# Patient Record
Sex: MALE | Race: WHITE
[De-identification: names, ages, dates, MRNs, and addresses within clinical notes are randomized per-mention and may not be internally consistent; named-entity substitution may affect disease eponyms.]

---

## 2017-03-02 ENCOUNTER — APPOINTMENT (OUTPATIENT)
Age: 47
End: 2017-03-02

## 2017-03-02 ENCOUNTER — APPOINTMENT (OUTPATIENT)
Dept: UROLOGY | Facility: CLINIC | Age: 47
End: 2017-03-02

## 2017-03-02 VITALS — HEIGHT: 69 IN | BODY MASS INDEX: 24.73 KG/M2 | WEIGHT: 167 LBS

## 2017-03-02 VITALS — SYSTOLIC BLOOD PRESSURE: 121 MMHG | DIASTOLIC BLOOD PRESSURE: 80 MMHG | HEART RATE: 62 BPM | OXYGEN SATURATION: 98 %

## 2017-03-02 DIAGNOSIS — R39.15 URGENCY OF URINATION: ICD-10-CM

## 2017-03-02 DIAGNOSIS — I86.1 SCROTAL VARICES: ICD-10-CM

## 2017-03-02 DIAGNOSIS — N50.812 LEFT TESTICULAR PAIN: ICD-10-CM

## 2017-03-02 DIAGNOSIS — F52.21 MALE ERECTILE DISORDER: ICD-10-CM

## 2017-03-02 DIAGNOSIS — R35.0 FREQUENCY OF MICTURITION: ICD-10-CM

## 2017-03-02 DIAGNOSIS — Z86.39 PERSONAL HISTORY OF OTHER ENDOCRINE, NUTRITIONAL AND METABOLIC DISEASE: ICD-10-CM

## 2017-03-02 DIAGNOSIS — F17.200 NICOTINE DEPENDENCE, UNSPECIFIED, UNCOMPLICATED: ICD-10-CM

## 2017-03-02 DIAGNOSIS — Z86.79 PERSONAL HISTORY OF OTHER DISEASES OF THE CIRCULATORY SYSTEM: ICD-10-CM

## 2017-03-02 RX ORDER — ALPRAZOLAM 0.25 MG/1
0.25 TABLET ORAL
Qty: 60 | Refills: 0 | Status: ACTIVE | COMMUNITY
Start: 2017-01-05

## 2017-03-02 RX ORDER — LOSARTAN POTASSIUM 50 MG/1
50 TABLET, FILM COATED ORAL
Qty: 30 | Refills: 0 | Status: ACTIVE | COMMUNITY
Start: 2017-02-13

## 2019-10-01 ENCOUNTER — TRANSCRIPTION ENCOUNTER (OUTPATIENT)
Age: 49
End: 2019-10-01

## 2024-05-06 ENCOUNTER — APPOINTMENT (OUTPATIENT)
Dept: ORTHOPEDIC SURGERY | Facility: CLINIC | Age: 54
End: 2024-05-06
Payer: OTHER MISCELLANEOUS

## 2024-05-06 ENCOUNTER — RESULT CHARGE (OUTPATIENT)
Age: 54
End: 2024-05-06

## 2024-05-06 ENCOUNTER — NON-APPOINTMENT (OUTPATIENT)
Age: 54
End: 2024-05-06

## 2024-05-06 VITALS — WEIGHT: 180 LBS | HEIGHT: 68 IN | BODY MASS INDEX: 27.28 KG/M2

## 2024-05-06 DIAGNOSIS — M25.40 EFFUSION, UNSPECIFIED JOINT: ICD-10-CM

## 2024-05-06 PROCEDURE — 73562 X-RAY EXAM OF KNEE 3: CPT | Mod: RT

## 2024-05-06 PROCEDURE — 20610 DRAIN/INJ JOINT/BURSA W/O US: CPT | Mod: RT

## 2024-05-06 PROCEDURE — 99203 OFFICE O/P NEW LOW 30 MIN: CPT | Mod: ACP

## 2024-05-06 RX ORDER — OMEPRAZOLE 20 MG/1
20 CAPSULE, DELAYED RELEASE ORAL
Refills: 0 | Status: ACTIVE | COMMUNITY

## 2024-05-06 NOTE — HISTORY OF PRESENT ILLNESS
[de-identified] : 54-year-old male is here today for evaluation of his right knee.  Patient states he was at work, he states he went to walk in between 2 doors and slammed his knee into a door.  He states since then he has been having pain and difficulty walking.  This happened almost 2 weeks ago.  Denies any previous injuries to the knee.  He denies any buckling or instability.  He states he has been taking Tylenol and ibuprofen and using lidocaine patches.  He has been working since this happened but is having pain.  He denies any numbness tingling in the leg or any calf pain.

## 2024-05-06 NOTE — IMAGING
[de-identified] : On examination of his right knee he has mild swelling, no erythema, no ecchymosis.  He is nontender to the patella facets, negative patellar grind.  He is able to straight leg raise.  He can flex and extend the knee, he has some pain and decreased range of motion with flexion.  Negative varus valgus stress test, negative anterior drawer.  He is tender over the medial joint line, he is nontender over the lateral joint line.  He is nontender over the patella or quadricep tendons.  Positive Madan's.  The calf is soft and nontender.  X-rays taken in the office today of the right knee show some decreased range of motion of the medial and patellofemoral compartments.  No fractures, dislocations, or other bony abnormalities noted.

## 2024-05-06 NOTE — DISCUSSION/SUMMARY
[de-identified] : At this time we discussed different treatment options, he would like to do a cortisone injection today. Today under sterile technique the area was cleaned and prepped with alcohol, anesthetized with cold spray and prepped with betadine.  With the patient's consent, I injected 2.5 cc of dexamethasone and 2.5 cc of 1% lidocaine into the lateral joint space of the knee. The patient tolerated this well. The puncture site was cleaned and covered with a Band-Aid.  He is can to continue to rest ice and elevate, continue anti-inflammatories as needed for pain.  We will also submit for an MRI to rule out a meniscal tear.  He can call after the MRI to go over the results.  Will schedule him a follow-up for repeat evaluation. Patient will call me if any other problems or concerns.  Patient verbalized understanding and agreed with the plan, all questions were answered in the office today.  This is a Worker's Compensation case, he is going to return to work on Monday, May 13, 2024.  His degree of disability is mild-moderate.

## 2024-05-08 ENCOUNTER — NON-APPOINTMENT (OUTPATIENT)
Age: 54
End: 2024-05-08

## 2024-05-28 ENCOUNTER — APPOINTMENT (OUTPATIENT)
Dept: ORTHOPEDIC SURGERY | Facility: CLINIC | Age: 54
End: 2024-05-28
Payer: OTHER MISCELLANEOUS

## 2024-05-28 DIAGNOSIS — S89.91XA UNSPECIFIED INJURY OF RIGHT LOWER LEG, INITIAL ENCOUNTER: ICD-10-CM

## 2024-05-28 PROCEDURE — 99204 OFFICE O/P NEW MOD 45 MIN: CPT

## 2024-05-28 RX ORDER — MELOXICAM 15 MG/1
15 TABLET ORAL
Qty: 30 | Refills: 1 | Status: ACTIVE | COMMUNITY
Start: 2024-05-28 | End: 1900-01-01

## 2024-05-28 NOTE — HISTORY OF PRESENT ILLNESS
[de-identified] : Patient here for evaluation right knee pain. Complains of joint line pain Positive mechanical symptoms and catching  has had cortisone with no improvement  NAD Right knee No skin breakdown Medial joint line ttp Positive trina Negative lachman Negative varus/valgus instability ROM 0-130 Pain with forced extension and flexion NVI Compartments soft and NT  Xray reviewed and significant for right knee mild degenerative changes  mri right knee: mmt, chondromalacia  plan went over findings explained the imaging tx options explained op vs nonop will start pt fu in 6 wks, if no improvement, will consider knee arthroscopy mobic sent for pain

## 2024-07-09 ENCOUNTER — APPOINTMENT (OUTPATIENT)
Dept: ORTHOPEDIC SURGERY | Facility: CLINIC | Age: 54
End: 2024-07-09
Payer: OTHER MISCELLANEOUS

## 2024-07-09 DIAGNOSIS — S89.91XA UNSPECIFIED INJURY OF RIGHT LOWER LEG, INITIAL ENCOUNTER: ICD-10-CM

## 2024-07-09 PROCEDURE — 99214 OFFICE O/P EST MOD 30 MIN: CPT

## 2024-10-14 ENCOUNTER — OUTPATIENT (OUTPATIENT)
Dept: OUTPATIENT SERVICES | Facility: HOSPITAL | Age: 54
LOS: 1 days | End: 2024-10-14
Payer: COMMERCIAL

## 2024-10-14 VITALS
DIASTOLIC BLOOD PRESSURE: 96 MMHG | HEIGHT: 68 IN | HEART RATE: 60 BPM | SYSTOLIC BLOOD PRESSURE: 158 MMHG | WEIGHT: 178.57 LBS | TEMPERATURE: 98 F | RESPIRATION RATE: 18 BRPM | OXYGEN SATURATION: 98 %

## 2024-10-14 DIAGNOSIS — I86.1 SCROTAL VARICES: Chronic | ICD-10-CM

## 2024-10-14 DIAGNOSIS — S69.91XA UNSPECIFIED INJURY OF RIGHT WRIST, HAND AND FINGER(S), INITIAL ENCOUNTER: Chronic | ICD-10-CM

## 2024-10-14 DIAGNOSIS — S83.231A COMPLEX TEAR OF MEDIAL MENISCUS, CURRENT INJURY, RIGHT KNEE, INITIAL ENCOUNTER: ICD-10-CM

## 2024-10-14 DIAGNOSIS — T81.89XA OTHER COMPLICATIONS OF PROCEDURES, NOT ELSEWHERE CLASSIFIED, INITIAL ENCOUNTER: Chronic | ICD-10-CM

## 2024-10-14 LAB
ALBUMIN SERPL ELPH-MCNC: 4.6 G/DL — SIGNIFICANT CHANGE UP (ref 3.5–5.2)
ALP SERPL-CCNC: 79 U/L — SIGNIFICANT CHANGE UP (ref 30–115)
ALT FLD-CCNC: 15 U/L — SIGNIFICANT CHANGE UP (ref 0–41)
ANION GAP SERPL CALC-SCNC: 11 MMOL/L — SIGNIFICANT CHANGE UP (ref 7–14)
AST SERPL-CCNC: 14 U/L — SIGNIFICANT CHANGE UP (ref 0–41)
BASOPHILS # BLD AUTO: 0.06 K/UL — SIGNIFICANT CHANGE UP (ref 0–0.2)
BASOPHILS NFR BLD AUTO: 0.9 % — SIGNIFICANT CHANGE UP (ref 0–1)
BILIRUB SERPL-MCNC: 0.4 MG/DL — SIGNIFICANT CHANGE UP (ref 0.2–1.2)
BUN SERPL-MCNC: 18 MG/DL — SIGNIFICANT CHANGE UP (ref 10–20)
CALCIUM SERPL-MCNC: 9.8 MG/DL — SIGNIFICANT CHANGE UP (ref 8.4–10.5)
CHLORIDE SERPL-SCNC: 110 MMOL/L — SIGNIFICANT CHANGE UP (ref 98–110)
CO2 SERPL-SCNC: 24 MMOL/L — SIGNIFICANT CHANGE UP (ref 17–32)
CREAT SERPL-MCNC: 0.9 MG/DL — SIGNIFICANT CHANGE UP (ref 0.7–1.5)
EGFR: 100 ML/MIN/1.73M2 — SIGNIFICANT CHANGE UP
EOSINOPHIL # BLD AUTO: 0.06 K/UL — SIGNIFICANT CHANGE UP (ref 0–0.7)
EOSINOPHIL NFR BLD AUTO: 0.9 % — SIGNIFICANT CHANGE UP (ref 0–8)
GLUCOSE SERPL-MCNC: 108 MG/DL — HIGH (ref 70–99)
HCT VFR BLD CALC: 44.8 % — SIGNIFICANT CHANGE UP (ref 42–52)
HCV AB S/CO SERPL IA: 0.05 COI — SIGNIFICANT CHANGE UP
HCV AB SERPL-IMP: SIGNIFICANT CHANGE UP
HGB BLD-MCNC: 15.2 G/DL — SIGNIFICANT CHANGE UP (ref 14–18)
IMM GRANULOCYTES NFR BLD AUTO: 0.3 % — SIGNIFICANT CHANGE UP (ref 0.1–0.3)
LYMPHOCYTES # BLD AUTO: 1.45 K/UL — SIGNIFICANT CHANGE UP (ref 1.2–3.4)
LYMPHOCYTES # BLD AUTO: 21 % — SIGNIFICANT CHANGE UP (ref 20.5–51.1)
MCHC RBC-ENTMCNC: 29.6 PG — SIGNIFICANT CHANGE UP (ref 27–31)
MCHC RBC-ENTMCNC: 33.9 G/DL — SIGNIFICANT CHANGE UP (ref 32–37)
MCV RBC AUTO: 87.3 FL — SIGNIFICANT CHANGE UP (ref 80–94)
MONOCYTES # BLD AUTO: 0.5 K/UL — SIGNIFICANT CHANGE UP (ref 0.1–0.6)
MONOCYTES NFR BLD AUTO: 7.3 % — SIGNIFICANT CHANGE UP (ref 1.7–9.3)
NEUTROPHILS # BLD AUTO: 4.8 K/UL — SIGNIFICANT CHANGE UP (ref 1.4–6.5)
NEUTROPHILS NFR BLD AUTO: 69.6 % — SIGNIFICANT CHANGE UP (ref 42.2–75.2)
NRBC # BLD: 0 /100 WBCS — SIGNIFICANT CHANGE UP (ref 0–0)
PLATELET # BLD AUTO: 241 K/UL — SIGNIFICANT CHANGE UP (ref 130–400)
PMV BLD: 11.2 FL — HIGH (ref 7.4–10.4)
POTASSIUM SERPL-MCNC: 4.5 MMOL/L — SIGNIFICANT CHANGE UP (ref 3.5–5)
POTASSIUM SERPL-SCNC: 4.5 MMOL/L — SIGNIFICANT CHANGE UP (ref 3.5–5)
PROT SERPL-MCNC: 6.9 G/DL — SIGNIFICANT CHANGE UP (ref 6–8)
RBC # BLD: 5.13 M/UL — SIGNIFICANT CHANGE UP (ref 4.7–6.1)
RBC # FLD: 11.9 % — SIGNIFICANT CHANGE UP (ref 11.5–14.5)
SODIUM SERPL-SCNC: 145 MMOL/L — SIGNIFICANT CHANGE UP (ref 135–146)
WBC # BLD: 6.89 K/UL — SIGNIFICANT CHANGE UP (ref 4.8–10.8)
WBC # FLD AUTO: 6.89 K/UL — SIGNIFICANT CHANGE UP (ref 4.8–10.8)

## 2024-10-14 PROCEDURE — 85025 COMPLETE CBC W/AUTO DIFF WBC: CPT

## 2024-10-14 PROCEDURE — 36415 COLL VENOUS BLD VENIPUNCTURE: CPT

## 2024-10-14 PROCEDURE — 80053 COMPREHEN METABOLIC PANEL: CPT

## 2024-10-14 PROCEDURE — 86803 HEPATITIS C AB TEST: CPT

## 2024-10-14 PROCEDURE — 93010 ELECTROCARDIOGRAM REPORT: CPT

## 2024-10-14 PROCEDURE — 99214 OFFICE O/P EST MOD 30 MIN: CPT | Mod: 25

## 2024-10-14 PROCEDURE — 93005 ELECTROCARDIOGRAM TRACING: CPT

## 2024-10-14 NOTE — H&P PST ADULT - NSANTHOSAYNRD_GEN_A_CORE
No. CHIKIS screening performed.  STOP BANG Legend: 0-2 = LOW Risk; 3-4 = INTERMEDIATE Risk; 5-8 = HIGH Risk

## 2024-10-14 NOTE — H&P PST ADULT - NSICDXPASTMEDICALHX_GEN_ALL_CORE_FT
PAST MEDICAL HISTORY:  Anxiety, Stress, and Tension     Depression     Dyslipidemia     Former smoker     [place] (hard of hearing)     HTN (hypertension)

## 2024-10-14 NOTE — H&P PST ADULT - HISTORY OF PRESENT ILLNESS
SCHEDULED FOR 10/28/24  RIGHT KNEE ARTHROSCOPY MEDIAL MENISCECTOMY  RIGHT KNEE PAIN X MANY MONTHS  SINCE INJURY 4/2024   PAIN SCALE 7/10 right knee aching throbbing complaints  /96. TODAY IN PRETESTING   Patient appears very anxious and claims "white coat syndrome "  also non compliant with medications .  patient stated he was advised not to take atorvastatin any longer due to diet control- BY PHYSICIAN  he stated he was prescribed lisinopril 40 mg for BP control-but does Not take.    Advised to take medications as advised and advised ER.   've discussed with the patient the abnormality found during their pre-procedure evaluation.  They have been told to go to the ER for further evaluation but refuse.  I have discussed the risks including the possibility of worsening disease, pain, permanent disability, and/or death with them.  They voiced understanding of this to me.  I advised them that they can and should go immediately if they develop any worse/different/additional symptoms, or if they change their mind and want to continue their evaluation as we discussed.    PATIENT STATES HE WILL FOLLOW UP WITH PRIMARY DOCTOR Today      PATIENT CURRENTLY DENIES CHEST PAIN  SHORTNESS OF BREATH  PALPITATIONS,  DYSURIA, OR UPPER RESPIRATORY INFECTION IN PAST 2 WEEKS    denies travel outside the [place] in the past 30 days  Denies fevers, chills or any symptoms of recent UTI's or any rashes.   Denies near syncope or syncope episodes.     Anesthesia Alert  NO--Difficult Airway  NO--History of neck surgery or radiation  NO--Limited ROM of neck  NO--History of Malignant hyperthermia  NO--No personal or family history of Pseudocholinesterase deficiency.  NO--Prior Anesthesia Complication  NO--Latex Allergy  NO--Loose teeth  NO--History of Rheumatoid Arthritis  NO--Bleeding risk  NO--CHIKIS  NO--Other_____    PT DENIES ANY RASHES, ABRASION, OR OPEN WOUNDS OR CUTS    AS PER THE PT, THIS IS HIS/HER COMPLETE MEDICAL AND SURGICAL HX, INCLUDING MEDICATIONS PRESCRIBED AND OVER THE COUNTER    Patient verbalized understanding of instructions and was given the opportunity to ask questions and have them answered.    pt denies any suicidal ideation or thoughts, pt states not a threat to self or others      Duke Activity Status Index (DASI) from [email or web address]  on 10/14/2024  ** All calculations should be rechecked by clinician prior to use **    RESULT SUMMARY:  58.2 points  The higher the score (maximum 58.2), the higher the functional status.    9.89 METs        INPUTS:  Take care of self —> 2.75 = Yes  Walk indoors —> 1.75 = Yes  Walk 1&ndash;2 blocks on level ground —> 2.75 = Yes  Climb a flight of stairs or walk up a hill —> 5.5 = Yes  Run a short distance —> 8 = Yes  Do light work around the house —> 2.7 = Yes  Do moderate work around the house —> 3.5 = Yes  Do heavy work around the house —> 8 = Yes  Do yardwork —> 4.5 = Yes  Have sexual relations —> 5.25 = Yes  Participate in moderate recreational activities —> 6 = Yes  Participate in strenuous sports —> 7.5 = Yes      Revised Cardiac Risk Index for Pre-Operative Risk from [email or web address]  on 10/14/2024  ** All calculations should be rechecked by clinician prior to use **    RESULT SUMMARY:  0 points  Class I Risk    3.9 %  30-day risk of death, MI, or cardiac arrest    From Gabby 2017. These numbers are higher than those from the original study (Keanu 1999). See Evidence for details.      INPUTS:  Elevated-risk surgery —> 0 = No  History of ischemic heart disease —> 0 = No  History of congestive heart failure —> 0 = No  History of cerebrovascular disease —> 0 = No  Pre-operative treatment with insulin —> 0 = No  Pre-operative creatinine >2 mg/dL / 176.8 µmol/L —> 0 = No

## 2024-10-15 ENCOUNTER — NON-APPOINTMENT (OUTPATIENT)
Age: 54
End: 2024-10-15

## 2024-10-15 DIAGNOSIS — S83.231A COMPLEX TEAR OF MEDIAL MENISCUS, CURRENT INJURY, RIGHT KNEE, INITIAL ENCOUNTER: ICD-10-CM

## 2024-10-25 NOTE — ASU PATIENT PROFILE, ADULT - NSICDXPASTMEDICALHX_GEN_ALL_CORE_FT
PAST MEDICAL HISTORY:  Anxiety, Stress, and Tension     Depression     Dyslipidemia     Former smoker     [place] (hard of hearing)     HTN (hypertension)

## 2024-10-28 ENCOUNTER — APPOINTMENT (OUTPATIENT)
Dept: ORTHOPEDIC SURGERY | Facility: AMBULATORY SURGERY CENTER | Age: 54
End: 2024-10-28

## 2024-10-28 ENCOUNTER — TRANSCRIPTION ENCOUNTER (OUTPATIENT)
Age: 54
End: 2024-10-28

## 2024-10-28 ENCOUNTER — OUTPATIENT (OUTPATIENT)
Dept: OUTPATIENT SERVICES | Facility: HOSPITAL | Age: 54
LOS: 1 days | Discharge: HOME | End: 2024-10-28
Payer: COMMERCIAL

## 2024-10-28 VITALS
WEIGHT: 178.57 LBS | HEART RATE: 53 BPM | TEMPERATURE: 98 F | HEIGHT: 68 IN | RESPIRATION RATE: 20 BRPM | SYSTOLIC BLOOD PRESSURE: 145 MMHG | OXYGEN SATURATION: 99 % | DIASTOLIC BLOOD PRESSURE: 89 MMHG

## 2024-10-28 VITALS
OXYGEN SATURATION: 99 % | RESPIRATION RATE: 21 BRPM | HEART RATE: 57 BPM | TEMPERATURE: 98 F | DIASTOLIC BLOOD PRESSURE: 106 MMHG | SYSTOLIC BLOOD PRESSURE: 171 MMHG
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
## 2024-10-28 DIAGNOSIS — S83.231A COMPLEX TEAR OF MEDIAL MENISCUS, CURRENT INJURY, RIGHT KNEE, INITIAL ENCOUNTER: ICD-10-CM

## 2024-10-28 DIAGNOSIS — S83.241A OTHER TEAR OF MEDIAL MENISCUS, CURRENT INJURY, RIGHT KNEE, INITIAL ENCOUNTER: ICD-10-CM

## 2024-10-28 DIAGNOSIS — I86.1 SCROTAL VARICES: Chronic | ICD-10-CM

## 2024-10-28 DIAGNOSIS — S83.249A OTHER TEAR OF MEDIAL MENISCUS, CURRENT INJURY, UNSPECIFIED KNEE, INITIAL ENCOUNTER: ICD-10-CM
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
## 2024-10-28 DIAGNOSIS — S69.91XA UNSPECIFIED INJURY OF RIGHT WRIST, HAND AND FINGER(S), INITIAL ENCOUNTER: Chronic | ICD-10-CM

## 2024-10-28 DIAGNOSIS — Z87.891 PERSONAL HISTORY OF NICOTINE DEPENDENCE: ICD-10-CM

## 2024-10-28 DIAGNOSIS — I10 ESSENTIAL (PRIMARY) HYPERTENSION: ICD-10-CM
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
## 2024-10-28 DIAGNOSIS — E78.5 HYPERLIPIDEMIA, UNSPECIFIED: ICD-10-CM

## 2024-10-28 PROCEDURE — 29881 ARTHRS KNE SRG MNISECTMY M/L: CPT | Mod: RT

## 2024-10-28 RX ORDER — HYDROMORPHONE HCL/0.9% NACL/PF 6 MG/30 ML
0.5 PATIENT CONTROLLED ANALGESIA SYRINGE INTRAVENOUS
Refills: 0 | Status: DISCONTINUED | OUTPATIENT
Start: 2024-10-28 | End: 2024-10-28

## 2024-10-28 RX ORDER — ONDANSETRON HYDROCHLORIDE 2 MG/ML
4 INJECTION, SOLUTION INTRAMUSCULAR; INTRAVENOUS ONCE
Refills: 0 | Status: DISCONTINUED | OUTPATIENT
Start: 2024-10-28 | End: 2024-10-28

## 2024-10-28 RX ORDER — TRAMADOL HYDROCHLORIDE 50 MG/1
1 TABLET, COATED ORAL
Qty: 12 | Refills: 0
Start: 2024-10-28 | End: 2024-10-30

## 2024-10-28 RX ORDER — ATORVASTATIN CALCIUM 10 MG/1
1 TABLET, FILM COATED ORAL
Refills: 0 | DISCHARGE

## 2024-10-28 RX ADMIN — Medication 0.5 MILLIGRAM(S): at 15:06

## 2024-10-28 RX ADMIN — Medication 100 MILLILITER(S): at 14:37

## 2024-10-28 RX ADMIN — Medication 0.5 MILLIGRAM(S): at 14:36

## 2024-10-28 NOTE — ASU DISCHARGE PLAN (ADULT/PEDIATRIC) - FINANCIAL ASSISTANCE
[facility] provides services at a reduced cost to those who are determined to be eligible through [facility]’s financial assistance program. Information regarding [facility]’s financial assistance program can be found by going to [email or web address] or by calling [phone].

## 2024-10-28 NOTE — ASU DISCHARGE PLAN (ADULT/PEDIATRIC) - ASU DC SPECIAL INSTRUCTIONSFT
Post Operative Instructions for Knee Surgery    Your Surgery Included  [x ] Menisectomy  [ ] Meniscus Repair					  [x ] Synovectomy/Plica Excision	  [x ] Debridement/Chondroplasty  [ ] Lysis of Adhesions  [ ] ACL reconstruction			  [ ] PCL Reconstruction  [ ] Other:  	  Call our office ([phone]) immediately if you experience any of the following:  •	Excessive bleeding or pus like drainage at the incision site  •	Uncontrollable pain not relieved by pain medication  •	Excessive swelling or redness at the incision site  •	Fever above 101.5 degrees not controlled with Tylenol or Motrin  •	Shortness of Breath  •	Any foul odor or blistering from the surgery site    Pain Management: You were given one or more prescriptions before leaving the hospital. Have the prescriptions filled at a pharmacy on your way home and follow the instructions on the bottles.   Regional Anesthesia Injections (Blocks): You may have been given a regional nerve block either before or after surgery. This may numb your leg for 24-36 hours  	*Proceed with caution when weight bearing on your leg    Diet: Eat a bland diet for the first day after surgery. Progress your diet as tolerated. Constipation may occur with Narcotic usage, contact our office if you are experiencing constipation.    Activity: Limit your activity during the first 48 hours, keep your leg elevated with pillows under your heel. After the first 48 hours at home, increase your activity level based on your symptoms.    Dressing Change: Remove the dressing on the 3rd day. It is normal for some blood to be seen on the dressings. It is also normal for you to see apparent bruising on the skin around your knee when you remove the dressing. If present, leave the steri-strip tape across the incisions. If you are concerned by the drainage or the appearance of your knee, please call one of the numbers listed. Keep covered with Band-Aids/bandages.    Showering: You may shower on the 5th day after surgery if the wound is dry and clean, but do not let the wound soak in water until sutures are removed. Do not submerge in any water until after your postoperative appointment in clinic.    Weight Bearing:						  [x ] Weight bearing as tolerated		  [ ] Nonweight bearing				  [ ] Other:						    Operative Knee Range of Motion  [x ] Full range of motion  [ ] ROM 0-90 deg  [ ] Other:    Knee Exercises: You may do these exercises for 2-5 mins five times a day in order to help regain your range of motion.  [x ] Quad Sets: Begin activating your quadriceps muscle by driving your knee downward into full knee extension while seated on a table or bed   with a towel rolled and propped under your heel  [x ] Straight Leg Raise: While antonia your quadriceps muscle, lift your fully extended leg to the level of your non-operative knee  [x ] Heel Slides: With the knee straight, slide your heel slowly toward your buttocks, hold at the endpoint for 10-15 seconds, then slowly straighten  [x ] Ankle Pumps: With your knee straight, move your ankle in a "pumping"  fashion to activate your calf and leg muscle     Follow Up: As Scheduled

## 2024-11-05 ENCOUNTER — NON-APPOINTMENT (OUTPATIENT)
Age: 54
End: 2024-11-05

## 2024-11-05 ENCOUNTER — APPOINTMENT (OUTPATIENT)
Dept: ORTHOPEDIC SURGERY | Facility: CLINIC | Age: 54
End: 2024-11-05
Payer: OTHER MISCELLANEOUS

## 2024-11-05 DIAGNOSIS — S89.91XA UNSPECIFIED INJURY OF RIGHT LOWER LEG, INITIAL ENCOUNTER: ICD-10-CM

## 2024-11-05 PROBLEM — Z87.891 PERSONAL HISTORY OF NICOTINE DEPENDENCE: Chronic | Status: ACTIVE | Noted: 2024-10-14

## 2024-11-05 PROBLEM — H91.90 UNSPECIFIED HEARING LOSS, UNSPECIFIED EAR: Chronic | Status: ACTIVE | Noted: 2024-10-14

## 2024-11-05 PROCEDURE — 99214 OFFICE O/P EST MOD 30 MIN: CPT

## 2024-12-12 ENCOUNTER — APPOINTMENT (OUTPATIENT)
Dept: ORTHOPEDIC SURGERY | Facility: CLINIC | Age: 54
End: 2024-12-12
Payer: OTHER MISCELLANEOUS

## 2024-12-12 DIAGNOSIS — S89.91XD UNSPECIFIED INJURY OF RIGHT LOWER LEG, SUBSEQUENT ENCOUNTER: ICD-10-CM

## 2024-12-12 PROCEDURE — 99024 POSTOP FOLLOW-UP VISIT: CPT

## 2025-02-13 ENCOUNTER — OUTPATIENT (OUTPATIENT)
Dept: OUTPATIENT SERVICES | Facility: HOSPITAL | Age: 55
LOS: 1 days | End: 2025-02-13
Payer: COMMERCIAL

## 2025-02-13 DIAGNOSIS — S69.91XA UNSPECIFIED INJURY OF RIGHT WRIST, HAND AND FINGER(S), INITIAL ENCOUNTER: Chronic | ICD-10-CM

## 2025-02-13 DIAGNOSIS — I25.10 ATHEROSCLEROTIC HEART DISEASE OF NATIVE CORONARY ARTERY WITHOUT ANGINA PECTORIS: ICD-10-CM

## 2025-02-13 DIAGNOSIS — Z00.8 ENCOUNTER FOR OTHER GENERAL EXAMINATION: ICD-10-CM

## 2025-02-13 DIAGNOSIS — I86.1 SCROTAL VARICES: Chronic | ICD-10-CM

## 2025-02-13 PROCEDURE — 75574 CT ANGIO HRT W/3D IMAGE: CPT

## 2025-02-13 PROCEDURE — 75574 CT ANGIO HRT W/3D IMAGE: CPT | Mod: 26

## 2025-02-14 DIAGNOSIS — I25.10 ATHEROSCLEROTIC HEART DISEASE OF NATIVE CORONARY ARTERY WITHOUT ANGINA PECTORIS: ICD-10-CM

## 2025-03-08 ENCOUNTER — OFFICE (OUTPATIENT)
Dept: URBAN - METROPOLITAN AREA CLINIC 76 | Facility: CLINIC | Age: 55
Setting detail: OPHTHALMOLOGY
End: 2025-03-08
Payer: COMMERCIAL

## 2025-03-08 DIAGNOSIS — H52.4: ICD-10-CM

## 2025-03-08 DIAGNOSIS — H40.013: ICD-10-CM

## 2025-03-08 DIAGNOSIS — H25.13: ICD-10-CM

## 2025-03-08 DIAGNOSIS — H00.012: ICD-10-CM

## 2025-03-08 DIAGNOSIS — H16.223: ICD-10-CM

## 2025-03-08 PROCEDURE — 92133 CPTRZD OPH DX IMG PST SGM ON: CPT | Performed by: OPTOMETRIST

## 2025-03-08 PROCEDURE — 92004 COMPRE OPH EXAM NEW PT 1/>: CPT | Performed by: OPTOMETRIST

## 2025-03-08 PROCEDURE — 92015 DETERMINE REFRACTIVE STATE: CPT | Performed by: OPTOMETRIST

## 2025-03-08 ASSESSMENT — REFRACTION_CURRENTRX
OD_CYLINDER: -1.25
OS_OVR_VA: 20/
OD_ADD: +2.00
OS_ADD: +2.00
OD_SPHERE: -2.75
OS_CYLINDER: -0.75
OD_OVR_VA: 20/
OD_AXIS: 077
OS_SPHERE: -3.25
OS_AXIS: 108

## 2025-03-08 ASSESSMENT — REFRACTION_MANIFEST
OS_SPHERE: -2.75
OD_CYLINDER: -0.75
OS_CYLINDER: -1.00
OS_AXIS: 080
OS_VA1: 20/25
OS_ADD: +2.00
OD_AXIS: 100
OD_SPHERE: -2.25
OD_VA1: 20/25
OD_ADD: +2.00

## 2025-03-08 ASSESSMENT — REFRACTION_AUTOREFRACTION
OS_CYLINDER: -1.25
OS_AXIS: 077
OD_SPHERE: -2.25
OS_SPHERE: -2.25
OD_CYLINDER: -0.75
OD_AXIS: 097

## 2025-03-08 ASSESSMENT — CONFRONTATIONAL VISUAL FIELD TEST (CVF)
OS_FINDINGS: FULL
OD_FINDINGS: FULL

## 2025-03-08 ASSESSMENT — VISUAL ACUITY
OD_BCVA: 20/25
OS_BCVA: 20/25-2

## 2025-03-08 ASSESSMENT — KERATOMETRY
OD_K2POWER_DIOPTERS: 44.25
OD_K1POWER_DIOPTERS: 43.50
OD_AXISANGLE_DEGREES: 016
OS_AXISANGLE_DEGREES: 168
OS_K2POWER_DIOPTERS: 44.00
OS_K1POWER_DIOPTERS: 42.75

## 2025-03-08 ASSESSMENT — SUPERFICIAL PUNCTATE KERATITIS (SPK)
OD_SPK: T
OS_SPK: T

## 2025-03-25 ENCOUNTER — OUTPATIENT (OUTPATIENT)
Dept: OUTPATIENT SERVICES | Facility: HOSPITAL | Age: 55
LOS: 1 days | End: 2025-03-25
Payer: COMMERCIAL

## 2025-03-25 DIAGNOSIS — S69.91XA UNSPECIFIED INJURY OF RIGHT WRIST, HAND AND FINGER(S), INITIAL ENCOUNTER: Chronic | ICD-10-CM

## 2025-03-25 DIAGNOSIS — R93.1 ABNORMAL FINDINGS ON DIAGNOSTIC IMAGING OF HEART AND CORONARY CIRCULATION: ICD-10-CM

## 2025-03-25 DIAGNOSIS — I86.1 SCROTAL VARICES: Chronic | ICD-10-CM

## 2025-03-25 PROCEDURE — 75580 N-INVAS EST C FFR SW ALY CTA: CPT | Mod: 26

## 2025-03-25 PROCEDURE — 75580 N-INVAS EST C FFR SW ALY CTA: CPT

## 2025-03-26 DIAGNOSIS — R93.1 ABNORMAL FINDINGS ON DIAGNOSTIC IMAGING OF HEART AND CORONARY CIRCULATION: ICD-10-CM

## 2025-03-27 ENCOUNTER — OUTPATIENT (OUTPATIENT)
Dept: OUTPATIENT SERVICES | Facility: HOSPITAL | Age: 55
LOS: 1 days | End: 2025-03-27
Payer: COMMERCIAL

## 2025-03-27 VITALS
DIASTOLIC BLOOD PRESSURE: 92 MMHG | TEMPERATURE: 98 F | HEART RATE: 50 BPM | RESPIRATION RATE: 16 BRPM | SYSTOLIC BLOOD PRESSURE: 140 MMHG | OXYGEN SATURATION: 98 % | WEIGHT: 188.5 LBS | HEIGHT: 69 IN

## 2025-03-27 DIAGNOSIS — S69.91XA UNSPECIFIED INJURY OF RIGHT WRIST, HAND AND FINGER(S), INITIAL ENCOUNTER: Chronic | ICD-10-CM

## 2025-03-27 DIAGNOSIS — Z01.818 ENCOUNTER FOR OTHER PREPROCEDURAL EXAMINATION: ICD-10-CM

## 2025-03-27 DIAGNOSIS — Z98.890 OTHER SPECIFIED POSTPROCEDURAL STATES: Chronic | ICD-10-CM

## 2025-03-27 DIAGNOSIS — I25.10 ATHEROSCLEROTIC HEART DISEASE OF NATIVE CORONARY ARTERY WITHOUT ANGINA PECTORIS: ICD-10-CM

## 2025-03-27 DIAGNOSIS — I86.1 SCROTAL VARICES: Chronic | ICD-10-CM

## 2025-03-27 LAB
ALBUMIN SERPL ELPH-MCNC: 4.8 G/DL — SIGNIFICANT CHANGE UP (ref 3.5–5.2)
ALP SERPL-CCNC: 83 U/L — SIGNIFICANT CHANGE UP (ref 30–115)
ALT FLD-CCNC: 15 U/L — SIGNIFICANT CHANGE UP (ref 0–41)
ANION GAP SERPL CALC-SCNC: 11 MMOL/L — SIGNIFICANT CHANGE UP (ref 7–14)
APTT BLD: 33.3 SEC — SIGNIFICANT CHANGE UP (ref 27–39.2)
AST SERPL-CCNC: 15 U/L — SIGNIFICANT CHANGE UP (ref 0–41)
BILIRUB SERPL-MCNC: 0.3 MG/DL — SIGNIFICANT CHANGE UP (ref 0.2–1.2)
BUN SERPL-MCNC: 19 MG/DL — SIGNIFICANT CHANGE UP (ref 10–20)
CALCIUM SERPL-MCNC: 9.6 MG/DL — SIGNIFICANT CHANGE UP (ref 8.4–10.5)
CHLORIDE SERPL-SCNC: 104 MMOL/L — SIGNIFICANT CHANGE UP (ref 98–110)
CO2 SERPL-SCNC: 23 MMOL/L — SIGNIFICANT CHANGE UP (ref 17–32)
CREAT SERPL-MCNC: 0.9 MG/DL — SIGNIFICANT CHANGE UP (ref 0.7–1.5)
EGFR: 100 ML/MIN/1.73M2 — SIGNIFICANT CHANGE UP
EGFR: 100 ML/MIN/1.73M2 — SIGNIFICANT CHANGE UP
GLUCOSE SERPL-MCNC: 104 MG/DL — HIGH (ref 70–99)
HCT VFR BLD CALC: 44.9 % — SIGNIFICANT CHANGE UP (ref 42–52)
HGB BLD-MCNC: 15.4 G/DL — SIGNIFICANT CHANGE UP (ref 14–18)
INR BLD: 0.9 RATIO — SIGNIFICANT CHANGE UP (ref 0.65–1.3)
MCHC RBC-ENTMCNC: 30.6 PG — SIGNIFICANT CHANGE UP (ref 27–31)
MCHC RBC-ENTMCNC: 34.3 G/DL — SIGNIFICANT CHANGE UP (ref 32–37)
MCV RBC AUTO: 89.1 FL — SIGNIFICANT CHANGE UP (ref 80–94)
NRBC BLD AUTO-RTO: 0 /100 WBCS — SIGNIFICANT CHANGE UP (ref 0–0)
PLATELET # BLD AUTO: 232 K/UL — SIGNIFICANT CHANGE UP (ref 130–400)
PMV BLD: 10.9 FL — HIGH (ref 7.4–10.4)
POTASSIUM SERPL-MCNC: 4.6 MMOL/L — SIGNIFICANT CHANGE UP (ref 3.5–5)
POTASSIUM SERPL-SCNC: 4.6 MMOL/L — SIGNIFICANT CHANGE UP (ref 3.5–5)
PROT SERPL-MCNC: 7.2 G/DL — SIGNIFICANT CHANGE UP (ref 6–8)
PROTHROM AB SERPL-ACNC: 10.6 SEC — SIGNIFICANT CHANGE UP (ref 9.95–12.87)
RBC # BLD: 5.04 M/UL — SIGNIFICANT CHANGE UP (ref 4.7–6.1)
RBC # FLD: 12.6 % — SIGNIFICANT CHANGE UP (ref 11.5–14.5)
SODIUM SERPL-SCNC: 138 MMOL/L — SIGNIFICANT CHANGE UP (ref 135–146)
WBC # BLD: 8.17 K/UL — SIGNIFICANT CHANGE UP (ref 4.8–10.8)
WBC # FLD AUTO: 8.17 K/UL — SIGNIFICANT CHANGE UP (ref 4.8–10.8)

## 2025-03-27 PROCEDURE — 85730 THROMBOPLASTIN TIME PARTIAL: CPT

## 2025-03-27 PROCEDURE — 85027 COMPLETE CBC AUTOMATED: CPT

## 2025-03-27 PROCEDURE — 36415 COLL VENOUS BLD VENIPUNCTURE: CPT

## 2025-03-27 PROCEDURE — 93005 ELECTROCARDIOGRAM TRACING: CPT

## 2025-03-27 PROCEDURE — 80053 COMPREHEN METABOLIC PANEL: CPT

## 2025-03-27 PROCEDURE — 85610 PROTHROMBIN TIME: CPT

## 2025-03-27 PROCEDURE — 99214 OFFICE O/P EST MOD 30 MIN: CPT | Mod: 25

## 2025-03-27 PROCEDURE — 93010 ELECTROCARDIOGRAM REPORT: CPT

## 2025-03-27 NOTE — H&P PST ADULT - HISTORY OF PRESENT ILLNESS
55 year old male here for cardiac cath, sx that led him here are bilateral jaw down to upper left chest. happens when he gets out of bed. Patient also has a strong medical hx. Patient had a cardiac CT report recorded of 768. patient for cardiac cath  fos=" a bunch"  denies chest pain sob palp  denies recent uri o uri  Anesthesia Alert  YES--Difficult Airway-IV airway  NO--History of neck surgery or radiation  NO--Limited ROM of neck  NO--History of Malignant hyperthermia  NO--Personal or family history of Pseudocholinesterase deficiency  NO--Prior Anesthesia Complication  NO--Latex Allergy  NO--Loose teeth  NO--History of Rheumatoid Arthritis  NO--CHIKIS  NO BLEEDING RISK  NO--Other_____    Patient verbalized understanding of instructions and was given the opportunity to ask questions and have them answered.  As per patient, this is their complete medical and surgical history, including medications both prescribed or over the counter.    58.2 points  The higher the score (maximum 58.2), the higher the functional status.  9.89 METs  0 points  RCRI Score  3.9 %  Risk of major cardiac event

## 2025-03-27 NOTE — H&P PST ADULT - NSICDXPASTMEDICALHX_GEN_ALL_CORE_FT
PAST MEDICAL HISTORY:  Anxiety, Stress, and Tension     Depression     Dyslipidemia     Former smoker     [place] (hard of hearing)     HTN (hypertension)

## 2025-03-28 DIAGNOSIS — Z01.818 ENCOUNTER FOR OTHER PREPROCEDURAL EXAMINATION: ICD-10-CM

## 2025-03-28 DIAGNOSIS — I25.10 ATHEROSCLEROTIC HEART DISEASE OF NATIVE CORONARY ARTERY WITHOUT ANGINA PECTORIS: ICD-10-CM

## 2025-04-02 ENCOUNTER — INPATIENT (INPATIENT)
Facility: HOSPITAL | Age: 55
LOS: 8 days | Discharge: HOME HEALTH | DRG: 233 | End: 2025-04-11
Attending: THORACIC SURGERY (CARDIOTHORACIC VASCULAR SURGERY) | Admitting: THORACIC SURGERY (CARDIOTHORACIC VASCULAR SURGERY)
Payer: COMMERCIAL

## 2025-04-02 ENCOUNTER — RESULT REVIEW (OUTPATIENT)
Age: 55
End: 2025-04-02

## 2025-04-02 VITALS
SYSTOLIC BLOOD PRESSURE: 138 MMHG | HEIGHT: 68.9 IN | RESPIRATION RATE: 16 BRPM | TEMPERATURE: 98 F | OXYGEN SATURATION: 97 % | WEIGHT: 175.05 LBS | DIASTOLIC BLOOD PRESSURE: 85 MMHG | HEART RATE: 52 BPM

## 2025-04-02 DIAGNOSIS — Z98.890 OTHER SPECIFIED POSTPROCEDURAL STATES: Chronic | ICD-10-CM

## 2025-04-02 DIAGNOSIS — I25.10 ATHEROSCLEROTIC HEART DISEASE OF NATIVE CORONARY ARTERY WITHOUT ANGINA PECTORIS: ICD-10-CM

## 2025-04-02 DIAGNOSIS — I86.1 SCROTAL VARICES: Chronic | ICD-10-CM

## 2025-04-02 DIAGNOSIS — S69.91XA UNSPECIFIED INJURY OF RIGHT WRIST, HAND AND FINGER(S), INITIAL ENCOUNTER: Chronic | ICD-10-CM

## 2025-04-02 LAB
A1C WITH ESTIMATED AVERAGE GLUCOSE RESULT: 5.6 % — SIGNIFICANT CHANGE UP (ref 4–5.6)
ABO RH CONFIRMATION: SIGNIFICANT CHANGE UP
ANION GAP SERPL CALC-SCNC: 8 MMOL/L — SIGNIFICANT CHANGE UP (ref 7–14)
BLD GP AB SCN SERPL QL: SIGNIFICANT CHANGE UP
BUN SERPL-MCNC: 20 MG/DL — SIGNIFICANT CHANGE UP (ref 10–20)
CALCIUM SERPL-MCNC: 9.4 MG/DL — SIGNIFICANT CHANGE UP (ref 8.4–10.5)
CHLORIDE SERPL-SCNC: 102 MMOL/L — SIGNIFICANT CHANGE UP (ref 98–110)
CO2 SERPL-SCNC: 28 MMOL/L — SIGNIFICANT CHANGE UP (ref 17–32)
CREAT SERPL-MCNC: 1.1 MG/DL — SIGNIFICANT CHANGE UP (ref 0.7–1.5)
EGFR: 78 ML/MIN/1.73M2 — SIGNIFICANT CHANGE UP
EGFR: 78 ML/MIN/1.73M2 — SIGNIFICANT CHANGE UP
ESTIMATED AVERAGE GLUCOSE: 114 MG/DL — SIGNIFICANT CHANGE UP (ref 68–114)
GLUCOSE SERPL-MCNC: 104 MG/DL — HIGH (ref 70–99)
HCT VFR BLD CALC: 46.2 % — SIGNIFICANT CHANGE UP (ref 42–52)
HGB BLD-MCNC: 15.7 G/DL — SIGNIFICANT CHANGE UP (ref 14–18)
MAGNESIUM SERPL-MCNC: 2.1 MG/DL — SIGNIFICANT CHANGE UP (ref 1.8–2.4)
MCHC RBC-ENTMCNC: 30.5 PG — SIGNIFICANT CHANGE UP (ref 27–31)
MCHC RBC-ENTMCNC: 34 G/DL — SIGNIFICANT CHANGE UP (ref 32–37)
MCV RBC AUTO: 89.9 FL — SIGNIFICANT CHANGE UP (ref 80–94)
MRSA PCR RESULT.: NEGATIVE — SIGNIFICANT CHANGE UP
NRBC BLD AUTO-RTO: 0 /100 WBCS — SIGNIFICANT CHANGE UP (ref 0–0)
NT-PROBNP SERPL-SCNC: <36 PG/ML — SIGNIFICANT CHANGE UP (ref 0–300)
PLATELET # BLD AUTO: 225 K/UL — SIGNIFICANT CHANGE UP (ref 130–400)
PMV BLD: 10.6 FL — HIGH (ref 7.4–10.4)
POTASSIUM SERPL-MCNC: 5.3 MMOL/L — HIGH (ref 3.5–5)
POTASSIUM SERPL-SCNC: 5.3 MMOL/L — HIGH (ref 3.5–5)
RBC # BLD: 5.14 M/UL — SIGNIFICANT CHANGE UP (ref 4.7–6.1)
RBC # FLD: 12.3 % — SIGNIFICANT CHANGE UP (ref 11.5–14.5)
SODIUM SERPL-SCNC: 138 MMOL/L — SIGNIFICANT CHANGE UP (ref 135–146)
TSH SERPL-MCNC: 1.5 UIU/ML — SIGNIFICANT CHANGE UP (ref 0.27–4.2)
WBC # BLD: 6.41 K/UL — SIGNIFICANT CHANGE UP (ref 4.8–10.8)
WBC # FLD AUTO: 6.41 K/UL — SIGNIFICANT CHANGE UP (ref 4.8–10.8)

## 2025-04-02 PROCEDURE — 97116 GAIT TRAINING THERAPY: CPT | Mod: GP

## 2025-04-02 PROCEDURE — 87641 MR-STAPH DNA AMP PROBE: CPT

## 2025-04-02 PROCEDURE — 81001 URINALYSIS AUTO W/SCOPE: CPT

## 2025-04-02 PROCEDURE — C1894: CPT

## 2025-04-02 PROCEDURE — 85610 PROTHROMBIN TIME: CPT

## 2025-04-02 PROCEDURE — 71045 X-RAY EXAM CHEST 1 VIEW: CPT

## 2025-04-02 PROCEDURE — 80053 COMPREHEN METABOLIC PANEL: CPT

## 2025-04-02 PROCEDURE — 93458 L HRT ARTERY/VENTRICLE ANGIO: CPT

## 2025-04-02 PROCEDURE — 93880 EXTRACRANIAL BILAT STUDY: CPT

## 2025-04-02 PROCEDURE — C1889: CPT

## 2025-04-02 PROCEDURE — 94002 VENT MGMT INPAT INIT DAY: CPT

## 2025-04-02 PROCEDURE — 82330 ASSAY OF CALCIUM: CPT

## 2025-04-02 PROCEDURE — 93925 LOWER EXTREMITY STUDY: CPT

## 2025-04-02 PROCEDURE — 97530 THERAPEUTIC ACTIVITIES: CPT | Mod: GP

## 2025-04-02 PROCEDURE — 83605 ASSAY OF LACTIC ACID: CPT

## 2025-04-02 PROCEDURE — C1751: CPT

## 2025-04-02 PROCEDURE — 93880 EXTRACRANIAL BILAT STUDY: CPT | Mod: 26

## 2025-04-02 PROCEDURE — 36415 COLL VENOUS BLD VENIPUNCTURE: CPT

## 2025-04-02 PROCEDURE — 93306 TTE W/DOPPLER COMPLETE: CPT | Mod: 26

## 2025-04-02 PROCEDURE — 85025 COMPLETE CBC W/AUTO DIFF WBC: CPT

## 2025-04-02 PROCEDURE — 84132 ASSAY OF SERUM POTASSIUM: CPT

## 2025-04-02 PROCEDURE — 84443 ASSAY THYROID STIM HORMONE: CPT

## 2025-04-02 PROCEDURE — 97110 THERAPEUTIC EXERCISES: CPT | Mod: GP

## 2025-04-02 PROCEDURE — 71045 X-RAY EXAM CHEST 1 VIEW: CPT | Mod: 26

## 2025-04-02 PROCEDURE — 81003 URINALYSIS AUTO W/O SCOPE: CPT

## 2025-04-02 PROCEDURE — 93318 ECHO TRANSESOPHAGEAL INTRAOP: CPT

## 2025-04-02 PROCEDURE — 92978 ENDOLUMINL IVUS OCT C 1ST: CPT | Mod: LD

## 2025-04-02 PROCEDURE — 85027 COMPLETE CBC AUTOMATED: CPT

## 2025-04-02 PROCEDURE — 86891 AUTOLOGOUS BLOOD OP SALVAGE: CPT

## 2025-04-02 PROCEDURE — 85018 HEMOGLOBIN: CPT

## 2025-04-02 PROCEDURE — 87040 BLOOD CULTURE FOR BACTERIA: CPT

## 2025-04-02 PROCEDURE — 86901 BLOOD TYPING SEROLOGIC RH(D): CPT

## 2025-04-02 PROCEDURE — 84145 PROCALCITONIN (PCT): CPT

## 2025-04-02 PROCEDURE — 86923 COMPATIBILITY TEST ELECTRIC: CPT

## 2025-04-02 PROCEDURE — 85730 THROMBOPLASTIN TIME PARTIAL: CPT

## 2025-04-02 PROCEDURE — C1729: CPT

## 2025-04-02 PROCEDURE — 87640 STAPH A DNA AMP PROBE: CPT

## 2025-04-02 PROCEDURE — 82803 BLOOD GASES ANY COMBINATION: CPT

## 2025-04-02 PROCEDURE — 97163 PT EVAL HIGH COMPLEX 45 MIN: CPT | Mod: GP

## 2025-04-02 PROCEDURE — 83735 ASSAY OF MAGNESIUM: CPT

## 2025-04-02 PROCEDURE — 94010 BREATHING CAPACITY TEST: CPT

## 2025-04-02 PROCEDURE — 94640 AIRWAY INHALATION TREATMENT: CPT

## 2025-04-02 PROCEDURE — 85014 HEMATOCRIT: CPT

## 2025-04-02 PROCEDURE — 83036 HEMOGLOBIN GLYCOSYLATED A1C: CPT

## 2025-04-02 PROCEDURE — 93306 TTE W/DOPPLER COMPLETE: CPT

## 2025-04-02 PROCEDURE — 84295 ASSAY OF SERUM SODIUM: CPT

## 2025-04-02 PROCEDURE — 83880 ASSAY OF NATRIURETIC PEPTIDE: CPT

## 2025-04-02 PROCEDURE — 93005 ELECTROCARDIOGRAM TRACING: CPT

## 2025-04-02 PROCEDURE — 86850 RBC ANTIBODY SCREEN: CPT

## 2025-04-02 PROCEDURE — 86900 BLOOD TYPING SEROLOGIC ABO: CPT

## 2025-04-02 PROCEDURE — 71046 X-RAY EXAM CHEST 2 VIEWS: CPT

## 2025-04-02 PROCEDURE — 82962 GLUCOSE BLOOD TEST: CPT

## 2025-04-02 RX ORDER — RANOLAZINE 1000 MG/1
500 TABLET, FILM COATED, EXTENDED RELEASE ORAL ONCE
Refills: 0 | Status: COMPLETED | OUTPATIENT
Start: 2025-04-02 | End: 2025-04-02

## 2025-04-02 RX ORDER — ATORVASTATIN CALCIUM 80 MG/1
40 TABLET, FILM COATED ORAL AT BEDTIME
Refills: 0 | Status: DISCONTINUED | OUTPATIENT
Start: 2025-04-02 | End: 2025-04-04

## 2025-04-02 RX ORDER — METOPROLOL SUCCINATE 50 MG/1
12.5 TABLET, EXTENDED RELEASE ORAL ONCE
Refills: 0 | Status: DISCONTINUED | OUTPATIENT
Start: 2025-04-02 | End: 2025-04-02

## 2025-04-02 RX ORDER — ASPIRIN 325 MG
81 TABLET ORAL DAILY
Refills: 0 | Status: DISCONTINUED | OUTPATIENT
Start: 2025-04-02 | End: 2025-04-04

## 2025-04-02 RX ORDER — ASPIRIN 325 MG
324 TABLET ORAL ONCE
Refills: 0 | Status: COMPLETED | OUTPATIENT
Start: 2025-04-02 | End: 2025-04-02

## 2025-04-02 RX ORDER — HEPARIN SODIUM 1000 [USP'U]/ML
5000 INJECTION INTRAVENOUS; SUBCUTANEOUS EVERY 12 HOURS
Refills: 0 | Status: DISCONTINUED | OUTPATIENT
Start: 2025-04-02 | End: 2025-04-04

## 2025-04-02 RX ORDER — MELATONIN 5 MG
5 TABLET ORAL AT BEDTIME
Refills: 0 | Status: DISCONTINUED | OUTPATIENT
Start: 2025-04-02 | End: 2025-04-04

## 2025-04-02 RX ORDER — ALPRAZOLAM 0.5 MG
0.25 TABLET, EXTENDED RELEASE 24 HR ORAL ONCE
Refills: 0 | Status: DISCONTINUED | OUTPATIENT
Start: 2025-04-02 | End: 2025-04-03

## 2025-04-02 RX ORDER — ALBUMIN (HUMAN) 12.5 G/50ML
3000 INJECTION, SOLUTION INTRAVENOUS ONCE
Refills: 0 | Status: DISCONTINUED | OUTPATIENT
Start: 2025-04-03 | End: 2025-04-10

## 2025-04-02 RX ORDER — AMLODIPINE BESYLATE 10 MG/1
2.5 TABLET ORAL ONCE
Refills: 0 | Status: COMPLETED | OUTPATIENT
Start: 2025-04-02 | End: 2025-04-02

## 2025-04-02 RX ADMIN — Medication 3 MILLILITER(S): at 21:51

## 2025-04-02 RX ADMIN — HEPARIN SODIUM 5000 UNIT(S): 1000 INJECTION INTRAVENOUS; SUBCUTANEOUS at 18:29

## 2025-04-02 RX ADMIN — Medication 150 MILLILITER(S): at 12:33

## 2025-04-02 RX ADMIN — Medication 75 MILLILITER(S): at 11:00

## 2025-04-02 RX ADMIN — AMLODIPINE BESYLATE 2.5 MILLIGRAM(S): 10 TABLET ORAL at 10:34

## 2025-04-02 RX ADMIN — Medication 324 MILLIGRAM(S): at 10:33

## 2025-04-02 RX ADMIN — Medication 250 MILLILITER(S): at 10:33

## 2025-04-02 RX ADMIN — Medication 5 MILLIGRAM(S): at 21:47

## 2025-04-02 RX ADMIN — ATORVASTATIN CALCIUM 40 MILLIGRAM(S): 80 TABLET, FILM COATED ORAL at 21:47

## 2025-04-02 RX ADMIN — RANOLAZINE 500 MILLIGRAM(S): 1000 TABLET, FILM COATED, EXTENDED RELEASE ORAL at 10:34

## 2025-04-02 NOTE — CHART NOTE - NSCHARTNOTEFT_GEN_A_CORE
PRE-OP DIAGNOSIS:  Abnormal CTA, new onset chest pain      PROCEDURE:     [X] Coronary Angiogram     [X] LHC     [] LVG     [] RHC     [X] Intervention (see below)         PHYSICIAN:  Dr. Rogers / Dr. Robledo    ASSISTANT:  Dr. Stubbs       PROCEDURE DESCRIPTION:     Consent:      [X] Patient     [] Family Member     []  Used        Anesthesia:     [X] General     [] Sedation     [X] Local        Access & Closure:     [X] 6Fr R Radial Artery     [] Fr Femoral Artery     [] Fr Femoral Vein     [] Fr Brachial Vein       IV Contrast: 80mL        Intervention:   IVUS LM/LAD    Implants:    None    FINDINGS:     Coronary Dominance: Right dominate      LM: 40% lesion distal LM    LAD: 90% ostial/prox LAD     CX: 50% lesion prox Lcx, 80% lesion OM1    RCA: 40% lesion distal RCA    LVEDP: 12 mmHg     ESTIMATED BLOOD LOSS: < 10 mL        CONDITION:     [X] Good     [] Fair     [] Critical        SPECIMEN REMOVED: N/A       POST-OP DIAGNOSIS:      [] Normal Coronary Angiogram     [] Mild Coronary Artery Disease (< 50% stenosis)     [X] 2 Vessel Coronary Artery Disease        PLAN OF CARE:     [] D/C Home Today     [] Return to In-patient bed     [] Admit for observation     [] Return for Staged Procedure     [X] CT Surgery Consult     [X] Medications: Aspirin, statin    [X] IV Fluids:NS 100cc/hr x 2hrs

## 2025-04-02 NOTE — ASU PATIENT PROFILE, ADULT - NSICDXPASTMEDICALHX_GEN_ALL_CORE_FT
PAST MEDICAL HISTORY:  Anxiety, Stress, and Tension     Depression     Dyslipidemia     Former smoker     [place] (hard of hearing)     HTN (hypertension)

## 2025-04-02 NOTE — PATIENT PROFILE ADULT - FALL HARM RISK - HARM RISK INTERVENTIONS
Communicate Risk of Fall with Harm to all staff/Reinforce activity limits and safety measures with patient and family/Review medications for side effects contributing to fall risk/Sit up slowly, dangle for a short time, stand at bedside before walking/Tailored Fall Risk Interventions/Toileting schedule using arm’s reach rule for commode and bathroom/Use of alarms - bed, chair and/or voice tab/Visual Cue: Yellow wristband and red socks/Bed in lowest position, wheels locked, appropriate side rails in place/Call bell, personal items and telephone in reach/Instruct patient to call for assistance before getting out of bed or chair/Non-slip footwear when patient is out of bed/[place] to call system/Physically safe environment - no spills, clutter or unnecessary equipment/Purposeful Proactive Rounding/Room/bathroom lighting operational, light cord in reach

## 2025-04-02 NOTE — CONSULT NOTE ADULT - SUBJECTIVE AND OBJECTIVE BOX
Surgeon: Dr. Young/Hank/Efe    Consult requesting by: MD Rogers      HISTORY OF PRESENT ILLNESS:    Patient is 55 year-old male former smoker with a past medical history HTN, HLD, and family history of CAD presented to the hospital for an elective diagnostic cardiac catheterization. Patient was seen at the cardiologist's office complaining of intermittent bilateral jaw pain radiating down to the upper left chest. Patient underwent a CCTA showing a calcium score of 768 and multivessel disease. Patient was referred for a LHC. Denies chest palpitations, lightheadedness, dizziness, syncope, nausea, vomiting, and bilateral leg swelling. Today, patient underwent LHC revealing 3vCAD. CT Surgery was consulted for an evaluation of myocardial revascularization via bypass grafts.       NYHA functional class    [X] Class I (no limitation) [ ] Class II (slight limitation) [ ] Class III (marked limitation) [ ] Class IV (symptoms at rest)      CCS Grading of Angina Pectoris  [ ] Class I                    Angina only during strenuous pr prolonged physical activity  [X] Class II                   Slight limitation, with anginaonly during vigorous physical activity  [ ] Class III                  Symptoms with everyday living activities, i.e. moderate limitation  [ ] Class IV                  Inability to perform any activity without angina or angina at rest, i.e. severe limitation      PAST MEDICAL & SURGICAL HISTORY:  HTN (hypertension)      Anxiety, Stress, and Tension      Depression      Dyslipidemia      [place] (hard of hearing)      Former smoker      Varicocele      Injury of ligament of right hand      H/O knee surgery          MEDICATIONS  (STANDING):  aspirin enteric coated 81 milliGRAM(s) Oral daily  atorvastatin 40 milliGRAM(s) Oral at bedtime  chlorhexidine 2% Cloths 1 Application(s) Topical daily  chlorhexidine 4% Liquid 1 Application(s) Topical once  chlorhexidine 4% Liquid 1 Application(s) Topical once  chlorhexidine 4% Liquid 1 Application(s) Topical once  heparin   Injectable 5000 Unit(s) SubCutaneous every 12 hours  metoprolol tartrate 12.5 milliGRAM(s) Oral once  sodium chloride 0.9% lock flush 3 milliLiter(s) IV Push every 8 hours  sodium chloride 0.9%. 1000 milliLiter(s) (150 mL/Hr) IV Continuous <Continuous>    MEDICATIONS  (PRN):      Home Medications:  lisinopril 40 mg oral tablet: 1 tab(s) orally once a day PATIENT NON COMPLIANT WITH MEDICATION (02 Apr 2025 09:30)      Allergies    No Known Allergies    Intolerances        SOCIAL HISTORY:  Smoker: [X] Yes  [ ] No         ETOH use: [ ] Yes  [X] No              FREQUENCY / QUANTITY:  Illicit Drug use:  [ ] Yes  [X] No  Occupation:   Lives with: wife  Assisted device use: independent  5 meter walk test: 1____sec, 2____sec, 3___sec    FAMILY HISTORY:  FH: CAD (coronary artery disease) Father(age 60)        Review of Systems  CONSTITUTIONAL:  Fevers[ ] chills[ ] sweats[ ] fatigue[ ] weight loss[ ] weight gain [ ]                                     NEGATIVE [X ]   NEURO:  paresthesias[ ] seizures [ ]  syncope [ ]  confusion [ ]                                                                                NEGATIVE[ X]   EYES: glasses[ ]  blurry vision[ ]  discharge[ ] pain[ ] glaucoma [ ]                                                                          NEGATIVE[X ]   ENMT:  difficulty hearing [ ]  vertigo[ ]  dysphagia[ ] epistaxis[ ] recent dental work [ ]                                    NEGATIVE[ X]   CV:  chest pain[ ] palpitations[ ] CORREA [ ] diaphoresis [ ]                                                                                           NEGATIVE[ X]   RESPIRATORY:  wheezing[ ] SOB[ ] cough [ ] sputum[ ] hemoptysis[ ]                                                                  NEGATIVE[ ]   GI:  nausea[ ]  vomiting [ ]  diarrhea[ ] constipation [ ] melena [ ]                                                                         NEGATIVE[ X]   : hematuria[ ]  dysuria[ ] urgency[ ] incontinence[ ]                                                                                            NEGATIVE[ X]   MUSKULOSKELETAL:  arthritis[ ]  joint swelling [ ] muscle weakness [ ] Hx vein stripping [ ]                             NEGATIVE[X ]   SKIN/BREAST:  rash[ ] itching [ ]  hair loss[ ] masses[ ]                                                                                              NEGATIVE[ X]   PSYCH:  dementia [ ] depression [ ] anxiety[ ]                                                                                                               NEGATIVE[X ]   HEME/LYMPH:  bruises easily[ ] enlarged lymph nodes[ ] tender lymph nodes[ ]                                               NEGATIVE[ X]   ENDOCRINE:  cold intolerance[ ] heat intolerance[ ] polydipsia[ ]                                                                          NEGATIVE[ X]     PHYSICAL EXAM  Vital Signs Last 24 Hrs  T(C): 36.6 (02 Apr 2025 09:58), Max: 36.6 (02 Apr 2025 09:58)  T(F): 97.8 (02 Apr 2025 09:58), Max: 97.8 (02 Apr 2025 09:58)  HR: 58 (02 Apr 2025 13:15) (52 - 58)  BP: 155/98 (02 Apr 2025 13:15) (138/85 - 164/93)  RR: 18 (02 Apr 2025 13:15) (16 - 18)  SpO2: 99% (02 Apr 2025 13:15) (95% - 99%)    Parameters below as of 02 Apr 2025 13:15  Patient On (Oxygen Delivery Method): room air      Right arm bp: unable to assess, DSTAT in place                                 Left arm bp;    CONSTITUTIONAL:  WNL[X]   Neuro: WNL [X] Normal exam oriented to person/place & time with no focal motor or sensory  deficits. Other                     Eyes:    WNL [X] Normal exam of conjunctiva & lids, pupils equally reactive. Other     ENT:     WNL [X] Normal exam of nasal/oral mucosa with absence of cyanosis. Other  Neck:   WNL [X] Normal exam of jugular veins, trachea & thyroid. Other  Chest:  WNL [X] Normal lung exam with good air movement absence of wheezes, rales, or rhonchi: Other                                                                                CV:  Auscultation: normal [X] S3[ ] S4[ ] Irregular [ ] Rub[ ] Clicks[ ]    Murmurs none:[X]systolic [ ]  diastolic [ ] holosystolic [ ]  Carotids: No Bruits[X] Other                  Abdominal Aorta: normal [X] nonpalpable[ ]Other                                                                                      GI: WNL[X] Normal exam of abdomen, liver & spleen with no noted masses or tenderness. Other                                                                                                        Extremities: WNL[X] Normal no evidence of cyanosis or deformity Edema: none[ ]trace[ ]1+[ ]2+[ ]3+[ ]4+[ ]  Lower Extremity Pulses: Right[X] Left[X]Varicosities[ ]  SKIN :WNL[X] Normal exam to inspection & palpation. Other:                                                          LABS:                        15.7   6.41  )-----------( 225      ( 02 Apr 2025 09:55 )             46.2     04-02    138  |  102  |  20  ----------------------------<  104[H]  5.3[H]   |  28  |  1.1    Ca    9.4      02 Apr 2025 09:55    Cardiac Cath: completed, awaiting for final report    TTE / DAVID: ordered, results pending    STS Score:     Procedure Type: Isolated CABG  Perioperative Outcome	Estimate %  Operative Mortality	0.356%  Morbidity & Mortality	2.86%  Stroke	0.523%  Renal Failure	0.319%  Reoperation	1.89%  Prolonged Ventilation	1.28%  Deep Sternal Wound Infection	0.09%  Long Hospital Stay (>14 days)	1.09%  Short Hospital Stay (<6 days)*	72.6%      Clinical Summary  Planned Surgery:	Isolated CABG, Urgent, First cardiovascular surgery  Demographics:	55 year old, male, 79.4kg, 175cm, BMI: 25.9 kg/m²  Lab Values:	Creatinine: 1.1 mg/dL, Hematocrit: 46.2%, WBC Count: 6.41 10³/µL, Platelet Count: 070866 cells/µL  Substance Abuse:	Former smoker, Alcohol use: = 1 drink/week  Risk Factors / Comorbidities:	Hypertension  Pulmonary RF:	Mild CLD  Cardiac Status:	NYHA Class I  Coronary Artery Disease:	3 vessels diseased, Stable Angina        Impression:  CAD [X]  Valvular  disease [ ]   Aortic Disease [ ]   BRENT: Yes[ ] No [X]  CKD Stage I [ ] , Stage II [ ] , Stage III [ ], Stage IV [ ]   Anemia: Yes [ ], No [X]  Diabetes :Yes [ ], No [X]  Acute MI: Yes [ ], No [X]   Heart Failure: Yes [ ] , No [X] HFpEF [ ], HFrEF [ ]      Assessment/ Plan:     Patient is 55 year-old male former smoker with a past medical history HTN, HLD, and family history of CAD presented to the hospital for an elective diagnostic cardiac catheterization. Patient was seen at the cardiologist's office complaining of intermittent bilateral jaw pain radiating down to the upper left chest. Patient underwent a CCTA showing a calcium score of 768 and multivessel disease. Patient was referred for a LHC. Denies chest palpitations, lightheadedness, dizziness, syncope, nausea, vomiting, and bilateral leg swelling. Today, patient underwent LHC revealing 3vCAD. CT Surgery was consulted for an evaluation of myocardial revascularization via bypass grafts.     -Case and plan discussed with CT surgeon Dr. Young. Initial STS risk assessed and discussed with patient. Evaluation by full heart team pending. Attending note to follow.    Recommendations:  [] hold Plavix  [] hold ASA if Pre-op Cardiac Valve surgery and patient without CAD  [] hold ACEI/ARB/CCB 24 hours prior to planned procedure   [] LUE/RUE precaution for possible radial artery harvest      Labs:  [] CBC  [] CMP  [] PT/INR/PTT  [X BNP  [X HgA1c  [X] Type and screen  [X] Urinalysis  [X] MRSA  [] COVID pcr    Diagnostic studies  [] CT HEAD Non-Contrast  [] CT Chest without/with contrast   [X] Carotid Duplex  [X] PFT: Simple PFT [X]  Full [ ]  [] JACKIE/PVR  [X] TTE

## 2025-04-02 NOTE — CHART NOTE - NSCHARTNOTEFT_GEN_A_CORE
INTERVENTIONAL CARDIOLOGY:                                               PREOPERATIVE DAY OF PROCEDURE EVALUATION:  I have personally seen and examined the patient.  I agree with the history and physical which I have reviewed and noted any changes below.     56 y/o male PMH: HTN, HLD, GERD, smoker.  Pt c/o B/L jaw pain radiating to chest.    S/p CCTA 25 janice score 762.   Presents today for [facility] with possible intervention.     CT Angio Heart and Coronaries w/ IV Cont (25 @ 09:36)     FINDINGS:    CALCIUM SCORE: The total Agatston coronary artery calcium score equals   762.  Left main: 0  Left anterior descendin  Left circumflex: 77  Right coronary: 154    CORONARY CT ANGIOGRAM: Right coronary artery dominance. No evidence for   anomalous coronary arteries. Mixed plaque causes at least moderate LAD   stenosis. Multifocal mixed plaque causes up to moderate RCA and   circumflex stenosis. No left main atherosclerosis or stenosis.    HEART AND VESSELS: The heart is normal in size. There are no   atherosclerotic calcifications of the aorta.  There is no pericardial   effusion.    VISUALIZED AIRWAYS AND LUNGS: The bronchial tree is patent.  The lungs   are clear.    VISUALIZED MEDIASTINUM AND PLEURA: There are no enlarged mediastinal or   hilar lymph nodes.    VISUALIZED UPPER ABDOMEN: Images of the upper abdomen demonstrate no   abnormality.    BONES AND SOFT TISSUES: The bones are unremarkable.  The soft tissues are   unremarkable.    IMPRESSION:    Mixed plaque causes at least moderate LAD stenosis. Multifocal mixed   plaque causes up to moderate RCA and circumflex stenosis. No left main   atherosclerosis or stenosis.    Degree of stenosis:  None: 0%, Minimal: 1%-24%, Mild: 25%-49%, Moderate:   50%-69%, Severe: >70% (>50% in the left main)         CT Fractional Flow Reserve (FFR-CT) Non-Invasive w/ Interpretation & Report (25 @ 13:34)     PROCEDURE DATE:  2025          INTERPRETATION:  FFR-CT CORONARY ANALYSIS  CLINICAL INDICATION: Abnormal coronary CCTA requiring functional analysis.  Pertinent Coronary CTA findings: Refer to cardiac CTA images from   2025    METHODS:  CT data images prepared and securely transmitted to [employer].?FFRCT   analysis was performed on the original cardiac CT angiogram dataset.   Diagrammatic representation of the FFRCT analysis is returned and   provided in a separate PDF document in PACS. This dictation was created   using the PDF document and an interactive 3D model of the results. 3D   model is not available in the EMR/PACS.    FINDINGS:  Segments of interest from the anatomic CT images are listed below.    Left Main: FFR CT of the distal LM with moderate stenosis is 0.95, which   is not hemodynamically significant with a low likelihood of flow-limiting   stenosis    LAD: FFR CT extendingto the distal segment is <0.5, which is   hemodynamically significant with a high likelihood of flow-limiting   stenosis.    LCX:FFR CT extending to the distal segment is 0.82, which is not   hemodynamically significant with a low likelihood of flow-limiting   stenosis    RCA:FFR CT extending to the distal segment is 0.74, which is   hemodynamically significant with a high likelihood of flow-limiting   stenosis.    IMPRESSION:    1. Hemodynamically significant LAD stenosis by FFR CT analysis  2. Hemodynamically significant RCA stenosis by FFR CT analysis.  3. CAD-RADS3/I+              Pre cath note:  indication:  [ ] STEMI                [ ] NSTEMI                 [ ] Acute coronary syndrome                   [ ]Unstable Angina   [ ] high risk  [ ] intermediate risk  [ ] low risk                   [ x] Stable Angina     non-invasive testing:                          Date:     2025                result: [ ] high risk  [x ] intermediate risk  [ ] low risk    Anti- Anginal medications:                    [ ] not used d/t                     [x ] used   ( ) BB     (x ) CCB   given   ( ) Nitrate   ( x ) Ranexa   given       [ ] not used but strong indication not to use    Ejection Fraction                   [ ] <29            [ ] 30-39%   [ ] 40-49%     [ ]>50%    CHF          [ ] active (within last 14 days on meds   [ ] Chronic (on meds but no exacerbation)  NYHA Functional Class:  (  ) Class I (no limitations)  (  ) Class II (slight limitation)  (  ) Class III (marked limitation)  (  ) Class IV (symptoms at rest)    COPD                   [ ] mild (on chronic bronchodilators)  [ ] moderate (on chronic steroid therapy)      [ ] severe (indication for home O2 or PACO2 >50)    Other risk factors:                     [ ] Previous MI                     [ ] CVA/ stroke                    [ ] carotid stent/ CEA                    [ ] PVD/PAD- (arterial aneurysm, non-palpable pulses, tortuous vessel with inability to insert catheter, infra-renal dissection, renal or subclavian artery stenosis)                    [ ] previous CABG                    [ ] Renal Failure:  on HD  (  ) yes  (  ) no                    [ ] Diabetic  (  ) Type 1  (  ) Type 2                                         (  ) Insulin dependent  (  ) non-insulin dependent                                         (  ) Metformin  (  ) Januvia  (  ) Glimepiride  (  ) Glipizide  (  ) Glyburide  (  ) Actos                                         (  ) GLP-1 receptor agonists (Ozempic, Wegovy, Zepbound, Mounjaro, Trulicity, Byetta, Victoza)                                         (  ) SGLT2 Inhibitors (Farxiga, Jardiance, Invokana)                                         (  ) Other        Bleeding Risk:  0.8%    Pre-cath Hydration: (  x)  cc IV bolus x 1 over 1 hr followed by:    (x ) NS @ 75cc/hr until procedure (up to 2 hrs) if EF> 50%                                                                                                                               RIGHT RADIAL KULDEEP TEST: Normal        EKG: 3/27/25  Sinus bradycardia  Vent rate 49bpm        REVIEW OF SYSTEMS:  CONSTITUTIONAL: No fever, weight loss, or fatigue  CARDIOLOGY: Patient denies chest pain, shortness of breath or syncopal episodes.   RESPIRATORY: denies shortness of breath, wheezing.   NEUROLOGICAL: NO weakness, no focal deficits to report.  GI: no BRBPR, no N,V,diarrhea.     PHYSICAL EXAM:  · CONSTITUTIONAL:	Well-developed, well nourished     · RESPIRATORY:   airway patent; breath sounds equal; good air movement; respirations non-labored; clear to auscultation bilaterally; no chest wall tenderness; no intercostal retractions; no rales,rhonchi or wheeze  · CARDIOVASCULAR	regular rate and rhythm  no rub  no murmur    · EXTREMITIES: No cyanosis, clubbing or edema  · VASCULAR: 	Equal and normal pulses (carotid, femoral, dorsalis pedis)  	       (Signed electronically by __________)  25 @ 10:00

## 2025-04-03 LAB
ALBUMIN SERPL ELPH-MCNC: 4.2 G/DL — SIGNIFICANT CHANGE UP (ref 3.5–5.2)
ALP SERPL-CCNC: 73 U/L — SIGNIFICANT CHANGE UP (ref 30–115)
ALT FLD-CCNC: 12 U/L — SIGNIFICANT CHANGE UP (ref 0–41)
ANION GAP SERPL CALC-SCNC: 8 MMOL/L — SIGNIFICANT CHANGE UP (ref 7–14)
APPEARANCE UR: CLEAR — SIGNIFICANT CHANGE UP
APTT BLD: 34.2 SEC — SIGNIFICANT CHANGE UP (ref 27–39.2)
AST SERPL-CCNC: 14 U/L — SIGNIFICANT CHANGE UP (ref 0–41)
BASOPHILS # BLD AUTO: 0.05 K/UL — SIGNIFICANT CHANGE UP (ref 0–0.2)
BASOPHILS NFR BLD AUTO: 0.8 % — SIGNIFICANT CHANGE UP (ref 0–1)
BILIRUB SERPL-MCNC: 0.3 MG/DL — SIGNIFICANT CHANGE UP (ref 0.2–1.2)
BILIRUB UR-MCNC: NEGATIVE — SIGNIFICANT CHANGE UP
BLD GP AB SCN SERPL QL: SIGNIFICANT CHANGE UP
BUN SERPL-MCNC: 25 MG/DL — HIGH (ref 10–20)
CALCIUM SERPL-MCNC: 9.5 MG/DL — SIGNIFICANT CHANGE UP (ref 8.4–10.5)
CHLORIDE SERPL-SCNC: 105 MMOL/L — SIGNIFICANT CHANGE UP (ref 98–110)
CO2 SERPL-SCNC: 24 MMOL/L — SIGNIFICANT CHANGE UP (ref 17–32)
COLOR SPEC: YELLOW — SIGNIFICANT CHANGE UP
CREAT SERPL-MCNC: 1.2 MG/DL — SIGNIFICANT CHANGE UP (ref 0.7–1.5)
DIFF PNL FLD: NEGATIVE — SIGNIFICANT CHANGE UP
EGFR: 71 ML/MIN/1.73M2 — SIGNIFICANT CHANGE UP
EGFR: 71 ML/MIN/1.73M2 — SIGNIFICANT CHANGE UP
EOSINOPHIL # BLD AUTO: 0.08 K/UL — SIGNIFICANT CHANGE UP (ref 0–0.7)
EOSINOPHIL NFR BLD AUTO: 1.3 % — SIGNIFICANT CHANGE UP (ref 0–8)
GLUCOSE SERPL-MCNC: 103 MG/DL — HIGH (ref 70–99)
GLUCOSE UR QL: NEGATIVE MG/DL — SIGNIFICANT CHANGE UP
HCT VFR BLD CALC: 42.2 % — SIGNIFICANT CHANGE UP (ref 42–52)
HGB BLD-MCNC: 14.2 G/DL — SIGNIFICANT CHANGE UP (ref 14–18)
IMM GRANULOCYTES NFR BLD AUTO: 0.5 % — HIGH (ref 0.1–0.3)
INR BLD: 0.93 RATIO — SIGNIFICANT CHANGE UP (ref 0.65–1.3)
KETONES UR-MCNC: NEGATIVE MG/DL — SIGNIFICANT CHANGE UP
LEUKOCYTE ESTERASE UR-ACNC: NEGATIVE — SIGNIFICANT CHANGE UP
LYMPHOCYTES # BLD AUTO: 1.32 K/UL — SIGNIFICANT CHANGE UP (ref 1.2–3.4)
LYMPHOCYTES # BLD AUTO: 22.2 % — SIGNIFICANT CHANGE UP (ref 20.5–51.1)
MAGNESIUM SERPL-MCNC: 2 MG/DL — SIGNIFICANT CHANGE UP (ref 1.8–2.4)
MCHC RBC-ENTMCNC: 30.3 PG — SIGNIFICANT CHANGE UP (ref 27–31)
MCHC RBC-ENTMCNC: 33.6 G/DL — SIGNIFICANT CHANGE UP (ref 32–37)
MCV RBC AUTO: 90 FL — SIGNIFICANT CHANGE UP (ref 80–94)
MONOCYTES # BLD AUTO: 0.49 K/UL — SIGNIFICANT CHANGE UP (ref 0.1–0.6)
MONOCYTES NFR BLD AUTO: 8.2 % — SIGNIFICANT CHANGE UP (ref 1.7–9.3)
NEUTROPHILS # BLD AUTO: 3.98 K/UL — SIGNIFICANT CHANGE UP (ref 1.4–6.5)
NEUTROPHILS NFR BLD AUTO: 67 % — SIGNIFICANT CHANGE UP (ref 42.2–75.2)
NITRITE UR-MCNC: NEGATIVE — SIGNIFICANT CHANGE UP
NRBC BLD AUTO-RTO: 0 /100 WBCS — SIGNIFICANT CHANGE UP (ref 0–0)
PH UR: 5.5 — SIGNIFICANT CHANGE UP (ref 5–8)
PLATELET # BLD AUTO: 218 K/UL — SIGNIFICANT CHANGE UP (ref 130–400)
PMV BLD: 11.1 FL — HIGH (ref 7.4–10.4)
POTASSIUM SERPL-MCNC: 4.9 MMOL/L — SIGNIFICANT CHANGE UP (ref 3.5–5)
POTASSIUM SERPL-SCNC: 4.9 MMOL/L — SIGNIFICANT CHANGE UP (ref 3.5–5)
PROT SERPL-MCNC: 6.4 G/DL — SIGNIFICANT CHANGE UP (ref 6–8)
PROT UR-MCNC: NEGATIVE MG/DL — SIGNIFICANT CHANGE UP
PROTHROM AB SERPL-ACNC: 10.9 SEC — SIGNIFICANT CHANGE UP (ref 9.95–12.87)
RBC # BLD: 4.69 M/UL — LOW (ref 4.7–6.1)
RBC # FLD: 12.3 % — SIGNIFICANT CHANGE UP (ref 11.5–14.5)
SODIUM SERPL-SCNC: 137 MMOL/L — SIGNIFICANT CHANGE UP (ref 135–146)
SP GR SPEC: 1.01 — SIGNIFICANT CHANGE UP (ref 1–1.03)
UROBILINOGEN FLD QL: 0.2 MG/DL — SIGNIFICANT CHANGE UP (ref 0.2–1)
WBC # BLD: 5.95 K/UL — SIGNIFICANT CHANGE UP (ref 4.8–10.8)
WBC # FLD AUTO: 5.95 K/UL — SIGNIFICANT CHANGE UP (ref 4.8–10.8)

## 2025-04-03 PROCEDURE — 93925 LOWER EXTREMITY STUDY: CPT | Mod: 26

## 2025-04-03 RX ORDER — ALPRAZOLAM 0.5 MG
0.25 TABLET, EXTENDED RELEASE 24 HR ORAL AT BEDTIME
Refills: 0 | Status: DISCONTINUED | OUTPATIENT
Start: 2025-04-03 | End: 2025-04-04

## 2025-04-03 RX ADMIN — Medication 1 APPLICATION(S): at 17:39

## 2025-04-03 RX ADMIN — Medication 3 MILLILITER(S): at 13:32

## 2025-04-03 RX ADMIN — Medication 1 APPLICATION(S): at 22:51

## 2025-04-03 RX ADMIN — Medication 81 MILLIGRAM(S): at 11:20

## 2025-04-03 RX ADMIN — Medication 3 MILLILITER(S): at 05:35

## 2025-04-03 RX ADMIN — HEPARIN SODIUM 5000 UNIT(S): 1000 INJECTION INTRAVENOUS; SUBCUTANEOUS at 17:30

## 2025-04-03 RX ADMIN — ATORVASTATIN CALCIUM 40 MILLIGRAM(S): 80 TABLET, FILM COATED ORAL at 22:17

## 2025-04-03 RX ADMIN — Medication 3 MILLILITER(S): at 22:51

## 2025-04-03 RX ADMIN — Medication 5 MILLIGRAM(S): at 22:17

## 2025-04-03 RX ADMIN — HEPARIN SODIUM 5000 UNIT(S): 1000 INJECTION INTRAVENOUS; SUBCUTANEOUS at 05:36

## 2025-04-03 NOTE — PROGRESS NOTE ADULT - SUBJECTIVE AND OBJECTIVE BOX
PLANNED OPERATIVE PROCEDURE(s): CABG               HD # 1                      SURGEON(s): MICHELLE Young    HPI:      Patient is 55 year-old male former smoker with a past medical history HTN, HLD, and family history of CAD presented to the hospital for an elective diagnostic cardiac catheterization. Patient was seen at the cardiologist's office complaining of intermittent bilateral jaw pain radiating down to the upper left chest. Patient underwent a CCTA showing a calcium score of 768 and multivessel disease. Patient was referred for a LHC. Denies chest palpitations, lightheadedness, dizziness, syncope, nausea, vomiting, and bilateral leg swelling. Today, patient underwent LHC revealing 3vCAD. CT Surgery was consulted for an evaluation of myocardial revascularization via bypass grafts.     SUBJECTIVE ASSESSMENT:55y Male patient seen and examined at bedside. Anxious about CABG surgery    Vital Signs Last 24 Hrs  T(F): 97.7 (03 Apr 2025 07:31), Max: 97.8 (02 Apr 2025 17:03)  HR: 55 (03 Apr 2025 07:31) (48 - 58)  BP: 133/82 (03 Apr 2025 07:31) (129/75 - 164/93)  BP(mean): 102 (03 Apr 2025 07:31) (89 - 102)  RR: 19 (03 Apr 2025 07:31) (15 - 22)  SpO2: 96% (03 Apr 2025 07:31) (95% - 99%)    RUE SBP: 155/94 (119)               LUE SBP: 160/90 (119)      I&O's Detail    02 Apr 2025 07:01  -  03 Apr 2025 07:00  --------------------------------------------------------  IN:    sodium chloride 0.9%: 450 mL  Total IN: 450 mL    OUT:    Voided (mL): 350 mL  Total OUT: 350 mL        Net: I&O's Detail    02 Apr 2025 07:01  -  03 Apr 2025 07:00  --------------------------------------------------------  Total NET: 100 mL        CAPILLARY BLOOD GLUCOSE          Allergies    No Known Allergies    Intolerances        MEDICATIONS  (STANDING):  albumin human  5% IVPB 3000 milliLiter(s) IV Intermittent once  aspirin enteric coated 81 milliGRAM(s) Oral daily  atorvastatin 40 milliGRAM(s) Oral at bedtime  chlorhexidine 2% Cloths 1 Application(s) Topical daily  chlorhexidine 4% Liquid 1 Application(s) Topical once  chlorhexidine 4% Liquid 1 Application(s) Topical once  heparin   Injectable 5000 Unit(s) SubCutaneous every 12 hours  melatonin 5 milliGRAM(s) Oral at bedtime  sodium chloride 0.9% lock flush 3 milliLiter(s) IV Push every 8 hours  sodium chloride 0.9%. 1000 milliLiter(s) (150 mL/Hr) IV Continuous <Continuous>    MEDICATIONS  (PRN):  ALPRAZolam 0.25 milliGRAM(s) Oral once PRN anxiety    Home Medications:  lisinopril 40 mg oral tablet: 1 tab(s) orally once a day PATIENT NON COMPLIANT WITH MEDICATION (02 Apr 2025 09:30)      Physical Exam:  General: NAD; A&Ox3  Cardiac: S1/S2, RRR, no murmur, no rubs  Lungs: unlabored respirations, CTA b/l, no wheeze, no rales, no crackles  Abdomen: Soft/NT/ND; positive bowel sounds x 4  Extremities: No edema b/l lower extremities; good capillary refill; no cyanosis; palpable 1+ pedal pulses b/l    BOWEL MOVEMENT:  [] YES [] NO, If No, Timing since last BM Day #        LABS:                        14.2   5.95  )-----------( 218      ( 03 Apr 2025 05:32 )             42.2                         15.7   6.41  )-----------( 225      ( 02 Apr 2025 09:55 )             46.2     04-03    137  |  105  |  25[H]  ----------------------------<  103[H]  4.9   |  24  |  1.2  04-02    138  |  102  |  20  ----------------------------<  104[H]  5.3[H]   |  28  |  1.1    Ca    9.5      03 Apr 2025 05:32  Mg     2.0     04-03    TPro  6.4 [6.0 - 8.0]  /  Alb  4.2 [3.5 - 5.2]  /  TBili  0.3 [0.2 - 1.2]  /  DBili  x   /  AST  14 [0 - 41]  /  ALT  12 [0 - 41]  /  AlkPhos  73 [30 - 115]  04-03    PT/INR - ( 03 Apr 2025 05:32 )   PT: ;   INR: 0.93 ratio         PTT - ( 03 Apr 2025 05:32 )  PTT:34.2 sec      A1C with Estimated Average Glucose Result: 5.6 % (04-02-25 @ 14:00)      RADIOLOGY & ADDITIONAL TESTS:  CXR:   < from: Xray Chest 1 View-PORTABLE IMMEDIATE (Xray Chest 1 View-PORTABLE IMMEDIATE .) (04.02.25 @ 14:17) >  INTERPRETATION:  CLINICAL HISTORY: Coronary artery disease    COMPARISON: September 14, 2012.    TECHNIQUE: Portable frontal chest radiograph.    FINDINGS:    Support devices: None.    Cardiac/mediastinum/hilum: Stable.    Lung parenchyma/Pleura: No focal parenchymal opacities, pleural   effusions, or pneumothorax.    Skeleton/soft tissues: Stable.      IMPRESSION:    No radiographic evidence of acute cardiopulmonary disease.      EKG:  < from: 12 Lead ECG (03.27.25 @ 07:41) >    Ventricular Rate 49 BPM    Atrial Rate 49 BPM    P-R Interval 148 ms    QRS Duration 90 ms    Q-T Interval 438 ms    QTC Calculation(Bazett) 395 ms    P Axis 30 degrees    R Axis 22 degrees    T Axis 21 degrees    Diagnosis Line Sinus bradycardia  Otherwise normal ECG    Carotid Duplex:   < from: VA Duplex Carotid, Bilat (04.02.25 @ 22:56) >    RIGHT:  PROX CCA = 101 cm/s  DIST CCA = 78 cm/s  PROX ICA = 73 cm/s  MID ICA = 79 cm/s  DIST ICA = 104 cm/s  ECA = 211 cm/s    LEFT:  PROX CCA = 142 cm/s  DIST CCA = 75 cm/s  PROX ICA = 75 cm/s  MID ICA = 88 cm/s  DIST ICA = 74 cm/s  ECA = 136 cm/s    Antegrade flow is noted within both vertebral arteries.    IMPRESSION: Less than 50% stenosis of the bilateral internal carotid   arteries       PFT's: ordered, results pending. IS 2500    Echocardiogram:   < from: TTE Echo Complete w/o Contrast w/ Doppler (04.02.25 @ 15:46) >  Summary:   1. Normal global left ventricular systolic function.   2. LV Ejection Fraction by Alfredo's Method with a biplane EF of 66 %.   3. The left ventricular diastolic function could not be assessed in this   study.   4. Normal left atrial size.   5. Normal right atrial size.   6. Mild mitral valve regurgitation.   7. Mild tricuspid regurgitation.   8. There is no evidence of pericardial effusion.      Cardiac catheterization:    FINDINGS:     Coronary Dominance: Right dominate      LM: 40% lesion distal LM    LAD: 90% ostial/prox LAD     CX: 50% lesion prox Lcx, 80% lesion OM1    RCA: 40% lesion distal RCA    LVEDP: 12 mmHg     CT CHEST:  < from: CT Fractional Flow Reserve (FFR-CT) Non-Invasive w/ Interpretation & Report (03.25.25 @ 13:34) >    FINDINGS:  Segments of interest from the anatomic CT images are listed below.    Left Main: FFR CT of the distal LM with moderate stenosis is 0.95, which   is not hemodynamically significant with a low likelihood of flow-limiting   stenosis    LAD: FFR CT extendingto the distal segment is <0.5, which is   hemodynamically significant with a high likelihood of flow-limiting   stenosis.    LCX:FFR CT extending to the distal segment is 0.82, which is not   hemodynamically significant with a low likelihood of flow-limiting   stenosis    RCA:FFR CT extending to the distal segment is 0.74, which is   hemodynamically significant with a high likelihood of flow-limiting   stenosis.    IMPRESSION:    1. Hemodynamically significant LAD stenosis by FFR CT analysis  2. Hemodynamically significant RCA stenosis by FFR CT analysis.  3. CAD-RADS3/I+    Referrence FFRCT Values:  a. >0.80 (distal to the stenosis): low-likelihood of flow-limitation.    b. >0.76-0.8 (distal to the stenosis): borderline likelihood of   flow-limitation.    c. ?0.75 (distal to the stenosis): high likelihood of flow-limitation.        Pharmacologic DVT Prophylaxis: [X] YES, []NO: Contraindication:   [X] HEPARIN: Dose: 5000  Q12H    [] LOVENOX: Dose: XX mg  Q24H                 SCD's: YESb/l    GI Prophylaxis: Protonix [X], Pepcid []    Pre-op ACEi/ARB/CCB held 24 hours prior to planned procedure: [X] Yes, [] NO: indication:  Pre-Op Beta-Blockers: [X]Yes, []No: contraindication:  Pre-Op Aspirin: [X]Yes,  []No: contraindication: [] Held for Pre-op cardiac valve surgery with no CAD  Pre-Op Statin: [X]Yes, []No: contraindication:    Ambulation/Activity Status: ambulate as tolerated  5meter walk test: T1: 5s/T2: 5s/T3: 5s        Cardiac Surgery Risk Factors  CVA and/or carotid/cerebrovascular disease. No  Aortoiliac disease No  Previous MI No  Previous Cardiac Surgery No  Hemodynamics-Unstable or Shock No  Diabetes No  Hepatic Failure No  Renal failure and/or dialysis No  Heart failure-type-present or past No  COPD No  Immune System Deficiency No  Malignant Ventricular Arrhythmia No    STS Score:   Procedure Type: Isolated CABG  Perioperative Outcome	Estimate %  Operative Mortality	0.356%  Morbidity & Mortality	2.86%  Stroke	0.523%  Renal Failure	0.319%  Reoperation	1.89%  Prolonged Ventilation	1.28%  Deep Sternal Wound Infection	0.09%  Long Hospital Stay (>14 days)	1.09%  Short Hospital Stay (<6 days)*	72.6%      Clinical Summary  Planned Surgery:	Isolated CABG, Urgent, First cardiovascular surgery  Demographics:	55 year old, male, 79.4kg, 175cm, BMI: 25.9 kg/m²  Lab Values:	Creatinine: 1.1 mg/dL, Hematocrit: 46.2%, WBC Count: 6.41 10³/µL, Platelet Count: 575529 cells/µL  Substance Abuse:	Former smoker, Alcohol use: = 1 drink/week  Risk Factors / Comorbidities:	Hypertension  Pulmonary RF:	None  Cardiac Status:	NYHA Class I  Coronary Artery Disease:	3 vessels diseased, Stable Angina    Assessment/Plan:    Patient is 55 year-old male former smoker with a past medical history HTN, HLD, and family history of CAD presented to the hospital for an elective diagnostic cardiac catheterization. Patient was seen at the cardiologist's office complaining of intermittent bilateral jaw pain radiating down to the upper left chest. Patient underwent a CCTA showing a calcium score of 768 and multivessel disease. Patient was referred for a LHC. Denies chest palpitations, lightheadedness, dizziness, syncope, nausea, vomiting, and bilateral leg swelling. Today, patient underwent LHC revealing 3vCAD. CT Surgery was consulted for an evaluation of myocardial revascularization via bypass grafts.     - Case and plan discussed with CTU Intensivist and CT Surgeon - Dr. Young/Hank. STS risk score assessed and discussed risk with patient. Attending note to follow.  - Continue supportive care.    - Continue DVT/GI prophylaxis  - Incentive Spirometry 10 times an hour  - Continue to advance physical activity as tolerated and continue PT/OT as directed  - CAD: Continue ASA & statin. low dose BB if tolerated. Pre-op work-up in progress, PFTs needed. Surgical scrubs and clipping ordered. NPO after midnight. Plan for CABG tomorrow 4/4

## 2025-04-03 NOTE — PRE-OP CHECKLIST - SELECT TESTS ORDERED
BMP/CBC/PT/PTT/INR/Urinalysis/EKG/CXR DC instructions 119/BMP/CBC/PT/PTT/INR/Urinalysis/EKG/CXR/POCT Blood Glucose

## 2025-04-04 ENCOUNTER — RESULT REVIEW (OUTPATIENT)
Age: 55
End: 2025-04-04

## 2025-04-04 ENCOUNTER — TRANSCRIPTION ENCOUNTER (OUTPATIENT)
Age: 55
End: 2025-04-04

## 2025-04-04 LAB
ALBUMIN SERPL ELPH-MCNC: 3.6 G/DL — SIGNIFICANT CHANGE UP (ref 3.5–5.2)
ALBUMIN SERPL ELPH-MCNC: 4.8 G/DL — SIGNIFICANT CHANGE UP (ref 3.5–5.2)
ALP SERPL-CCNC: 44 U/L — SIGNIFICANT CHANGE UP (ref 30–115)
ALP SERPL-CCNC: 89 U/L — SIGNIFICANT CHANGE UP (ref 30–115)
ALT FLD-CCNC: 10 U/L — SIGNIFICANT CHANGE UP (ref 0–41)
ALT FLD-CCNC: 15 U/L — SIGNIFICANT CHANGE UP (ref 0–41)
ANION GAP SERPL CALC-SCNC: 15 MMOL/L — HIGH (ref 7–14)
ANION GAP SERPL CALC-SCNC: 9 MMOL/L — SIGNIFICANT CHANGE UP (ref 7–14)
APTT BLD: 25.6 SEC — LOW (ref 27–39.2)
APTT BLD: 36.1 SEC — SIGNIFICANT CHANGE UP (ref 27–39.2)
AST SERPL-CCNC: 12 U/L — SIGNIFICANT CHANGE UP (ref 0–41)
AST SERPL-CCNC: 17 U/L — SIGNIFICANT CHANGE UP (ref 0–41)
BASOPHILS # BLD AUTO: 0.03 K/UL — SIGNIFICANT CHANGE UP (ref 0–0.2)
BASOPHILS NFR BLD AUTO: 0.2 % — SIGNIFICANT CHANGE UP (ref 0–1)
BILIRUB SERPL-MCNC: 0.5 MG/DL — SIGNIFICANT CHANGE UP (ref 0.2–1.2)
BILIRUB SERPL-MCNC: 0.5 MG/DL — SIGNIFICANT CHANGE UP (ref 0.2–1.2)
BUN SERPL-MCNC: 18 MG/DL — SIGNIFICANT CHANGE UP (ref 10–20)
BUN SERPL-MCNC: 20 MG/DL — SIGNIFICANT CHANGE UP (ref 10–20)
CALCIUM SERPL-MCNC: 8.1 MG/DL — LOW (ref 8.4–10.5)
CALCIUM SERPL-MCNC: 9.7 MG/DL — SIGNIFICANT CHANGE UP (ref 8.4–10.5)
CHLORIDE SERPL-SCNC: 101 MMOL/L — SIGNIFICANT CHANGE UP (ref 98–110)
CHLORIDE SERPL-SCNC: 104 MMOL/L — SIGNIFICANT CHANGE UP (ref 98–110)
CO2 SERPL-SCNC: 21 MMOL/L — SIGNIFICANT CHANGE UP (ref 17–32)
CO2 SERPL-SCNC: 21 MMOL/L — SIGNIFICANT CHANGE UP (ref 17–32)
CREAT SERPL-MCNC: 1 MG/DL — SIGNIFICANT CHANGE UP (ref 0.7–1.5)
CREAT SERPL-MCNC: 1.1 MG/DL — SIGNIFICANT CHANGE UP (ref 0.7–1.5)
EGFR: 78 ML/MIN/1.73M2 — SIGNIFICANT CHANGE UP
EGFR: 78 ML/MIN/1.73M2 — SIGNIFICANT CHANGE UP
EGFR: 88 ML/MIN/1.73M2 — SIGNIFICANT CHANGE UP
EGFR: 88 ML/MIN/1.73M2 — SIGNIFICANT CHANGE UP
EOSINOPHIL # BLD AUTO: 0 K/UL — SIGNIFICANT CHANGE UP (ref 0–0.7)
EOSINOPHIL NFR BLD AUTO: 0 % — SIGNIFICANT CHANGE UP (ref 0–8)
GAS PNL BLDA: SIGNIFICANT CHANGE UP
GLUCOSE BLDC GLUCOMTR-MCNC: 101 MG/DL — HIGH (ref 70–99)
GLUCOSE BLDC GLUCOMTR-MCNC: 119 MG/DL — HIGH (ref 70–99)
GLUCOSE BLDC GLUCOMTR-MCNC: 119 MG/DL — HIGH (ref 70–99)
GLUCOSE BLDC GLUCOMTR-MCNC: 120 MG/DL — HIGH (ref 70–99)
GLUCOSE BLDC GLUCOMTR-MCNC: 130 MG/DL — HIGH (ref 70–99)
GLUCOSE BLDC GLUCOMTR-MCNC: 173 MG/DL — HIGH (ref 70–99)
GLUCOSE SERPL-MCNC: 102 MG/DL — HIGH (ref 70–99)
GLUCOSE SERPL-MCNC: 155 MG/DL — HIGH (ref 70–99)
HCT VFR BLD CALC: 31.4 % — LOW (ref 42–52)
HCT VFR BLD CALC: 48.1 % — SIGNIFICANT CHANGE UP (ref 42–52)
HGB BLD-MCNC: 11.1 G/DL — LOW (ref 14–18)
HGB BLD-MCNC: 16.6 G/DL — SIGNIFICANT CHANGE UP (ref 14–18)
IMM GRANULOCYTES NFR BLD AUTO: 0.6 % — HIGH (ref 0.1–0.3)
INR BLD: 0.83 RATIO — SIGNIFICANT CHANGE UP (ref 0.65–1.3)
INR BLD: 1.11 RATIO — SIGNIFICANT CHANGE UP (ref 0.65–1.3)
LYMPHOCYTES # BLD AUTO: 0.71 K/UL — LOW (ref 1.2–3.4)
LYMPHOCYTES # BLD AUTO: 5.1 % — LOW (ref 20.5–51.1)
MAGNESIUM SERPL-MCNC: 1.5 MG/DL — LOW (ref 1.8–2.4)
MAGNESIUM SERPL-MCNC: 2.1 MG/DL — SIGNIFICANT CHANGE UP (ref 1.8–2.4)
MCHC RBC-ENTMCNC: 30.7 PG — SIGNIFICANT CHANGE UP (ref 27–31)
MCHC RBC-ENTMCNC: 31 PG — SIGNIFICANT CHANGE UP (ref 27–31)
MCHC RBC-ENTMCNC: 34.5 G/DL — SIGNIFICANT CHANGE UP (ref 32–37)
MCHC RBC-ENTMCNC: 35.4 G/DL — SIGNIFICANT CHANGE UP (ref 32–37)
MCV RBC AUTO: 87.7 FL — SIGNIFICANT CHANGE UP (ref 80–94)
MCV RBC AUTO: 89.1 FL — SIGNIFICANT CHANGE UP (ref 80–94)
MONOCYTES # BLD AUTO: 0.66 K/UL — HIGH (ref 0.1–0.6)
MONOCYTES NFR BLD AUTO: 4.8 % — SIGNIFICANT CHANGE UP (ref 1.7–9.3)
NEUTROPHILS # BLD AUTO: 12.32 K/UL — HIGH (ref 1.4–6.5)
NEUTROPHILS NFR BLD AUTO: 89.3 % — HIGH (ref 42.2–75.2)
NRBC BLD AUTO-RTO: 0 /100 WBCS — SIGNIFICANT CHANGE UP (ref 0–0)
NRBC BLD AUTO-RTO: 0 /100 WBCS — SIGNIFICANT CHANGE UP (ref 0–0)
PLATELET # BLD AUTO: 178 K/UL — SIGNIFICANT CHANGE UP (ref 130–400)
PLATELET # BLD AUTO: 214 K/UL — SIGNIFICANT CHANGE UP (ref 130–400)
PMV BLD: 11.2 FL — HIGH (ref 7.4–10.4)
PMV BLD: 11.3 FL — HIGH (ref 7.4–10.4)
POTASSIUM SERPL-MCNC: 4.6 MMOL/L — SIGNIFICANT CHANGE UP (ref 3.5–5)
POTASSIUM SERPL-MCNC: 4.6 MMOL/L — SIGNIFICANT CHANGE UP (ref 3.5–5)
POTASSIUM SERPL-SCNC: 4.6 MMOL/L — SIGNIFICANT CHANGE UP (ref 3.5–5)
POTASSIUM SERPL-SCNC: 4.6 MMOL/L — SIGNIFICANT CHANGE UP (ref 3.5–5)
PROT SERPL-MCNC: 4.9 G/DL — LOW (ref 6–8)
PROT SERPL-MCNC: 7.7 G/DL — SIGNIFICANT CHANGE UP (ref 6–8)
PROTHROM AB SERPL-ACNC: 13.1 SEC — HIGH (ref 9.95–12.87)
PROTHROM AB SERPL-ACNC: 9.8 SEC — LOW (ref 9.95–12.87)
RBC # BLD: 3.58 M/UL — LOW (ref 4.7–6.1)
RBC # BLD: 5.4 M/UL — SIGNIFICANT CHANGE UP (ref 4.7–6.1)
RBC # FLD: 12 % — SIGNIFICANT CHANGE UP (ref 11.5–14.5)
RBC # FLD: 12.3 % — SIGNIFICANT CHANGE UP (ref 11.5–14.5)
SODIUM SERPL-SCNC: 134 MMOL/L — LOW (ref 135–146)
SODIUM SERPL-SCNC: 137 MMOL/L — SIGNIFICANT CHANGE UP (ref 135–146)
WBC # BLD: 13.8 K/UL — HIGH (ref 4.8–10.8)
WBC # BLD: 6.12 K/UL — SIGNIFICANT CHANGE UP (ref 4.8–10.8)
WBC # FLD AUTO: 13.8 K/UL — HIGH (ref 4.8–10.8)
WBC # FLD AUTO: 6.12 K/UL — SIGNIFICANT CHANGE UP (ref 4.8–10.8)

## 2025-04-04 PROCEDURE — 33508 ENDOSCOPIC VEIN HARVEST: CPT | Mod: 59

## 2025-04-04 PROCEDURE — 33518 CABG ARTERY-VEIN TWO: CPT | Mod: AS

## 2025-04-04 PROCEDURE — 99292 CRITICAL CARE ADDL 30 MIN: CPT

## 2025-04-04 PROCEDURE — 33533 CABG ARTERIAL SINGLE: CPT | Mod: AS

## 2025-04-04 PROCEDURE — 99291 CRITICAL CARE FIRST HOUR: CPT

## 2025-04-04 PROCEDURE — 71045 X-RAY EXAM CHEST 1 VIEW: CPT | Mod: 26

## 2025-04-04 PROCEDURE — 33518 CABG ARTERY-VEIN TWO: CPT

## 2025-04-04 PROCEDURE — 76998 US GUIDE INTRAOP: CPT | Mod: 26,59

## 2025-04-04 PROCEDURE — 33533 CABG ARTERIAL SINGLE: CPT

## 2025-04-04 PROCEDURE — 93010 ELECTROCARDIOGRAM REPORT: CPT

## 2025-04-04 PROCEDURE — 33508 ENDOSCOPIC VEIN HARVEST: CPT | Mod: AS,59

## 2025-04-04 PROCEDURE — 76998 US GUIDE INTRAOP: CPT | Mod: 26,AS,59

## 2025-04-04 RX ORDER — ACETAMINOPHEN 500 MG/5ML
1000 LIQUID (ML) ORAL ONCE
Refills: 0 | Status: COMPLETED | OUTPATIENT
Start: 2025-04-04 | End: 2025-04-04

## 2025-04-04 RX ORDER — FENTANYL CITRATE-0.9 % NACL/PF 100MCG/2ML
25 SYRINGE (ML) INTRAVENOUS ONCE
Refills: 0 | Status: DISCONTINUED | OUTPATIENT
Start: 2025-04-04 | End: 2025-04-04

## 2025-04-04 RX ORDER — BISACODYL 5 MG
5 TABLET, DELAYED RELEASE (ENTERIC COATED) ORAL EVERY 12 HOURS
Refills: 0 | Status: DISCONTINUED | OUTPATIENT
Start: 2025-04-05 | End: 2025-04-11

## 2025-04-04 RX ORDER — HYDROMORPHONE/SOD CHLOR,ISO/PF 2 MG/10 ML
0.5 SYRINGE (ML) INJECTION ONCE
Refills: 0 | Status: DISCONTINUED | OUTPATIENT
Start: 2025-04-04 | End: 2025-04-04

## 2025-04-04 RX ORDER — FOLIC ACID 1 MG/1
1 TABLET ORAL DAILY
Refills: 0 | Status: DISCONTINUED | OUTPATIENT
Start: 2025-04-05 | End: 2025-04-11

## 2025-04-04 RX ORDER — DEXMEDETOMIDINE HYDROCHLORIDE IN SODIUM CHLORIDE 4 UG/ML
0.25 INJECTION INTRAVENOUS
Qty: 200 | Refills: 0 | Status: DISCONTINUED | OUTPATIENT
Start: 2025-04-04 | End: 2025-04-05

## 2025-04-04 RX ORDER — POLYETHYLENE GLYCOL 3350 17 G/17G
17 POWDER, FOR SOLUTION ORAL DAILY
Refills: 0 | Status: DISCONTINUED | OUTPATIENT
Start: 2025-04-05 | End: 2025-04-11

## 2025-04-04 RX ORDER — ALBUTEROL SULFATE 2.5 MG/3ML
2 VIAL, NEBULIZER (ML) INHALATION EVERY 6 HOURS
Refills: 0 | Status: DISCONTINUED | OUTPATIENT
Start: 2025-04-04 | End: 2025-04-05

## 2025-04-04 RX ORDER — ASPIRIN 325 MG
300 TABLET ORAL ONCE
Refills: 0 | Status: COMPLETED | OUTPATIENT
Start: 2025-04-04 | End: 2025-04-04

## 2025-04-04 RX ORDER — DEXTROSE 50 % IN WATER 50 %
50 SYRINGE (ML) INTRAVENOUS
Refills: 0 | Status: DISCONTINUED | OUTPATIENT
Start: 2025-04-04 | End: 2025-04-10

## 2025-04-04 RX ORDER — PROPOFOL 10 MG/ML
15 INJECTION, EMULSION INTRAVENOUS
Qty: 1000 | Refills: 0 | Status: DISCONTINUED | OUTPATIENT
Start: 2025-04-04 | End: 2025-04-05

## 2025-04-04 RX ORDER — FERROUS SULFATE 137(45) MG
325 TABLET, EXTENDED RELEASE ORAL DAILY
Refills: 0 | Status: DISCONTINUED | OUTPATIENT
Start: 2025-04-05 | End: 2025-04-11

## 2025-04-04 RX ORDER — ASPIRIN 325 MG
325 TABLET ORAL DAILY
Refills: 0 | Status: DISCONTINUED | OUTPATIENT
Start: 2025-04-05 | End: 2025-04-11

## 2025-04-04 RX ORDER — MAGNESIUM SULFATE 500 MG/ML
2 SYRINGE (ML) INJECTION ONCE
Refills: 0 | Status: COMPLETED | OUTPATIENT
Start: 2025-04-04 | End: 2025-04-04

## 2025-04-04 RX ORDER — SENNA 187 MG
1 TABLET ORAL AT BEDTIME
Refills: 0 | Status: DISCONTINUED | OUTPATIENT
Start: 2025-04-05 | End: 2025-04-11

## 2025-04-04 RX ORDER — OXYCODONE HYDROCHLORIDE 30 MG/1
10 TABLET ORAL EVERY 4 HOURS
Refills: 0 | Status: DISCONTINUED | OUTPATIENT
Start: 2025-04-05 | End: 2025-04-11

## 2025-04-04 RX ORDER — GABAPENTIN 400 MG/1
300 CAPSULE ORAL EVERY 8 HOURS
Refills: 0 | Status: COMPLETED | OUTPATIENT
Start: 2025-04-05 | End: 2025-04-07

## 2025-04-04 RX ORDER — CYST/ALA/Q10/PHOS.SER/DHA/BROC 100-20-50
1 POWDER (GRAM) ORAL DAILY
Refills: 0 | Status: DISCONTINUED | OUTPATIENT
Start: 2025-04-05 | End: 2025-04-11

## 2025-04-04 RX ORDER — ACETAMINOPHEN 500 MG/5ML
650 LIQUID (ML) ORAL EVERY 6 HOURS
Refills: 0 | Status: DISCONTINUED | OUTPATIENT
Start: 2025-04-05 | End: 2025-04-06

## 2025-04-04 RX ORDER — OXYCODONE HYDROCHLORIDE 30 MG/1
5 TABLET ORAL EVERY 4 HOURS
Refills: 0 | Status: DISCONTINUED | OUTPATIENT
Start: 2025-04-05 | End: 2025-04-11

## 2025-04-04 RX ORDER — CEFAZOLIN SODIUM IN 0.9 % NACL 3 G/100 ML
2000 INTRAVENOUS SOLUTION, PIGGYBACK (ML) INTRAVENOUS EVERY 8 HOURS
Refills: 0 | Status: COMPLETED | OUTPATIENT
Start: 2025-04-04 | End: 2025-04-06

## 2025-04-04 RX ORDER — NICARDIPINE HCL 30 MG
5 CAPSULE ORAL
Qty: 40 | Refills: 0 | Status: DISCONTINUED | OUTPATIENT
Start: 2025-04-04 | End: 2025-04-05

## 2025-04-04 RX ORDER — NITROGLYCERIN 20 MG/G
5 OINTMENT TOPICAL
Qty: 50 | Refills: 0 | Status: DISCONTINUED | OUTPATIENT
Start: 2025-04-04 | End: 2025-04-05

## 2025-04-04 RX ORDER — ONDANSETRON HCL/PF 4 MG/2 ML
4 VIAL (ML) INJECTION ONCE
Refills: 0 | Status: DISCONTINUED | OUTPATIENT
Start: 2025-04-04 | End: 2025-04-05

## 2025-04-04 RX ORDER — LIDOCAINE HYDROCHLORIDE 20 MG/ML
1 JELLY TOPICAL EVERY 24 HOURS
Refills: 0 | Status: DISCONTINUED | OUTPATIENT
Start: 2025-04-05 | End: 2025-04-11

## 2025-04-04 RX ORDER — NOREPINEPHRINE BITARTRATE 8 MG
0.05 SOLUTION INTRAVENOUS
Qty: 8 | Refills: 0 | Status: DISCONTINUED | OUTPATIENT
Start: 2025-04-04 | End: 2025-04-05

## 2025-04-04 RX ORDER — VANCOMYCIN HCL IN 5 % DEXTROSE 1.5G/250ML
1000 PLASTIC BAG, INJECTION (ML) INTRAVENOUS EVERY 12 HOURS
Refills: 0 | Status: COMPLETED | OUTPATIENT
Start: 2025-04-04 | End: 2025-04-05

## 2025-04-04 RX ADMIN — Medication 2 PUFF(S): at 14:30

## 2025-04-04 RX ADMIN — Medication 15 MILLILITER(S): at 06:20

## 2025-04-04 RX ADMIN — Medication 0.5 MILLIGRAM(S): at 20:21

## 2025-04-04 RX ADMIN — Medication 25 MICROGRAM(S): at 18:06

## 2025-04-04 RX ADMIN — Medication 400 MILLIGRAM(S): at 15:04

## 2025-04-04 RX ADMIN — Medication 3 MILLILITER(S): at 05:31

## 2025-04-04 RX ADMIN — Medication 0.5 MILLIGRAM(S): at 19:21

## 2025-04-04 RX ADMIN — Medication 2 PUFF(S): at 19:43

## 2025-04-04 RX ADMIN — DEXMEDETOMIDINE HYDROCHLORIDE IN SODIUM CHLORIDE 4.94 MICROGRAM(S)/KG/HR: 4 INJECTION INTRAVENOUS at 15:06

## 2025-04-04 RX ADMIN — Medication 1000 MILLIGRAM(S): at 15:16

## 2025-04-04 RX ADMIN — Medication 100 MILLIGRAM(S): at 17:16

## 2025-04-04 RX ADMIN — NOREPINEPHRINE BITARTRATE 7.41 MICROGRAM(S)/KG/MIN: 8 SOLUTION at 14:00

## 2025-04-04 RX ADMIN — Medication 1 UNIT(S)/HR: at 15:05

## 2025-04-04 RX ADMIN — Medication 20 MILLILITER(S): at 15:05

## 2025-04-04 RX ADMIN — Medication 1 APPLICATION(S): at 06:23

## 2025-04-04 RX ADMIN — Medication 400 MILLIGRAM(S): at 21:50

## 2025-04-04 RX ADMIN — Medication 25 GRAM(S): at 15:04

## 2025-04-04 RX ADMIN — LIDOCAINE HYDROCHLORIDE 1 PATCH: 20 JELLY TOPICAL at 23:23

## 2025-04-04 RX ADMIN — Medication 250 MILLIGRAM(S): at 17:16

## 2025-04-04 RX ADMIN — Medication 1000 MILLIGRAM(S): at 22:50

## 2025-04-04 RX ADMIN — Medication 20 MILLIGRAM(S): at 17:16

## 2025-04-04 RX ADMIN — Medication 25 MG/HR: at 21:50

## 2025-04-04 NOTE — PROGRESS NOTE ADULT - SUBJECTIVE AND OBJECTIVE BOX
CTU Attending Progress Daily Note     04 Apr 2025 17:12  Procedure: OPCABx4  POD#: 0    He has history of HTN (hypertension)    Anxiety, Stress, and Tension    Depression    Dyslipidemia    [place] (hard of hearing)    Former smoker      HPI: Patient is 55 year-old male former smoker with a past medical history HTN, HLD, and family history of CAD presented to the hospital for an elective diagnostic cardiac catheterization. Patient was seen at the cardiologist's office complaining of intermittent bilateral jaw pain radiating down to the upper left chest. Patient underwent a CCTA showing a calcium score of 768 and multivessel disease. Patient was referred for a LHC. Denies chest palpitations, lightheadedness, dizziness, syncope, nausea, vomiting, and bilateral leg swelling. Today, patient underwent LHC revealing 3vCAD. CT Surgery was consulted for an evaluation of myocardial revascularization via bypass grafts.     OR Data  Procedure: off pump CABGx4  Bypass: N/A  Cross Clamp: N/A   Pre LV Fxn: 50-55%      RV Fxn: nml  Post LV Fxn: 50-55%     RV Fxn: nml  Blood Products: None  Cell Saver: 228 cc  Fluids: 2.5L crystalloid/500 cc Albumin  Pacing Wires: A/V    Hospital Course:  4/4: Arrived intubated on Levophed. Extubated in short course and weaned off pressors    OBJECTIVE:  ICU Vital Signs Last 24 Hrs  T(C): 37 (04 Apr 2025 14:00), Max: 37 (04 Apr 2025 14:00)  T(F): 98.6 (04 Apr 2025 14:00), Max: 98.6 (04 Apr 2025 14:00)  HR: 80 (04 Apr 2025 16:15) (46 - 99)  BP: 107/65 (04 Apr 2025 16:00) (105/62 - 141/82)  BP(mean): 81 (04 Apr 2025 16:00) (79 - 105)  ABP: 119/70 (04 Apr 2025 16:15) (63/38 - 179/103)  ABP(mean): 85 (04 Apr 2025 16:15) (46 - 128)  RR: 16 (04 Apr 2025 16:15) (9 - 34)  SpO2: 94% (04 Apr 2025 16:15) (94% - 100%)    O2 Parameters below as of 04 Apr 2025 15:31  Patient On (Oxygen Delivery Method): mask, aerosol  O2 Flow (L/min): 7  O2 Concentration (%): 40      I&O's Summary    03 Apr 2025 07:01  -  04 Apr 2025 07:00  --------------------------------------------------------  IN: 810 mL / OUT: 575 mL / NET: 235 mL    04 Apr 2025 07:01  -  04 Apr 2025 17:12  --------------------------------------------------------  IN: 102 mL / OUT: 280 mL / NET: -178 mL      I&O's Detail    03 Apr 2025 07:01  -  04 Apr 2025 07:00  --------------------------------------------------------  IN:    Oral Fluid: 810 mL  Total IN: 810 mL    OUT:    Voided (mL): 575 mL  Total OUT: 575 mL    Total NET: 235 mL      04 Apr 2025 07:01  -  04 Apr 2025 17:12  --------------------------------------------------------  IN:    Dexmedetomidine: 14 mL    Insulin: 8 mL    sodium chloride 0.9%: 80 mL  Total IN: 102 mL    OUT:    Chest Tube (mL): 15 mL    Chest Tube (mL): 100 mL    Indwelling Catheter - Urethral (mL): 165 mL  Total OUT: 280 mL    Total NET: -178 mL    Adult Advanced Hemodynamics Last 24 Hrs  CVP(mm Hg): 12 (04 Apr 2025 16:15) (4 - 18)  CVP(cm H2O): --  CO: --  CI: --  PA: --  PA(mean): --  PCWP: --  SVR: --  SVRI: --  PVR: --  PVRI: --  Mode: standby    CAPILLARY BLOOD GLUCOSE      POCT Blood Glucose.: 130 mg/dL (04 Apr 2025 15:49)  POCT Blood Glucose.: 173 mg/dL (04 Apr 2025 13:32)  POCT Blood Glucose.: 119 mg/dL (04 Apr 2025 04:56)    LABS:  ABG - ( 04 Apr 2025 15:21 )  pH, Arterial: 7.35  pH, Blood: x     /  pCO2: 39    /  pO2: 95    / HCO3: 22    / Base Excess: -3.8  /  SaO2: 99.1                          11.1   13.80 )-----------( 178      ( 04 Apr 2025 13:58 )             31.4     04-04    134[L]  |  104  |  18  ----------------------------<  155[H]  4.6   |  21  |  1.0    Ca    8.1[L]      04 Apr 2025 13:58  Mg     1.5     04-04    TPro  4.9[L]  /  Alb  3.6  /  TBili  0.5  /  DBili  x   /  AST  12  /  ALT  10  /  AlkPhos  44  04-04    PT/INR - ( 04 Apr 2025 13:58 )   PT: 13.10 sec;   INR: 1.11 ratio         PTT - ( 04 Apr 2025 13:58 )  PTT:25.6 sec  Urinalysis Basic - ( 04 Apr 2025 13:58 )    Color: x / Appearance: x / SG: x / pH: x  Gluc: 155 mg/dL / Ketone: x  / Bili: x / Urobili: x   Blood: x / Protein: x / Nitrite: x   Leuk Esterase: x / RBC: x / WBC x   Sq Epi: x / Non Sq Epi: x / Bacteria: x    Home Medications:  lisinopril 40 mg oral tablet: 1 tab(s) orally once a day PATIENT NON COMPLIANT WITH MEDICATION (02 Apr 2025 09:30)    HOSPITAL MEDICATIONS:  MEDICATIONS  (STANDING):  albumin human  5% IVPB 3000 milliLiter(s) IV Intermittent once  albuterol    90 MICROgram(s) HFA Inhaler 2 Puff(s) Inhalation every 6 hours  aspirin Suppository 300 milliGRAM(s) Rectal once  ceFAZolin   IVPB 2000 milliGRAM(s) IV Intermittent every 8 hours  chlorhexidine 0.12% Liquid 15 milliLiter(s) Oral Mucosa every 12 hours  dexMEDEtomidine Infusion 0.25 MICROgram(s)/kG/Hr (4.94 mL/Hr) IV Continuous <Continuous>  dextrose 50% Injectable 50 milliLiter(s) IV Push every 15 minutes  famotidine Injectable 20 milliGRAM(s) IV Push every 12 hours  insulin regular Infusion 1 Unit(s)/Hr (1 mL/Hr) IV Continuous <Continuous>  ipratropium 17 MICROgram(s) HFA Inhaler 2 Puff(s) Inhalation every 6 hours  niCARdipine Infusion 5 mG/Hr (25 mL/Hr) IV Continuous <Continuous>  nitroglycerin  Infusion 5 MICROgram(s)/Min (1.5 mL/Hr) IV Continuous <Continuous>  norepinephrine Infusion 0.05 MICROgram(s)/kG/Min (7.41 mL/Hr) IV Continuous <Continuous>  ondansetron Injectable 4 milliGRAM(s) IV Push once  propofol Infusion 15 MICROgram(s)/kG/Min (7.11 mL/Hr) IV Continuous <Continuous>  sodium chloride 0.9%. 1000 milliLiter(s) (150 mL/Hr) IV Continuous <Continuous>  sodium chloride 0.9%. 1000 milliLiter(s) (20 mL/Hr) IV Continuous <Continuous>  vancomycin  IVPB 1000 milliGRAM(s) IV Intermittent every 12 hours    MEDICATIONS  (PRN):  fentaNYL    Injectable 25 MICROGram(s) IV Push once PRN Moderate Pain (4 - 6)    RADIOLOGY:  Chest X-ray Reviewed    REVIEW OF SYSTEMS:  CONSTITUTIONAL: [X] all negative; [ ] weakness, [ ] fevers, [ ] chills  EYES/ENT: [X] all negative; [ ] visual changes, [ ] vertigo, [ ] throat pain   NECK: [X] all negative; [ ] pain, [ ] stiffness  RESPIRATORY: [] all negative, [ ] cough, [ ] wheezing, [ ] hemoptysis, [ ] shortness of breath  CARDIOVASCULAR: [] all negative; [ ] chest pain, [ ] palpitations, [ ] orthopnea  GASTROINTESTINAL: [X] all negative; [ ]abdominal pain, [ ] nausea, [ ] vomiting, [ ] hematemesis, [ ] diarrhea, [ ] constipation, [ ] melena, [ ] hematochezia.  GENITOURINARY: [X] all negative; [ ] dysuria, [ ] frequency, [ ] hematuria  NEUROLOGICAL: [X] all negative; [ ] numbness, [ ] weakness  SKIN: [X] all negative; [ ] itching, [ ] burning, [ ] rashes, [ ] lesions   All other review of systems is negative unless indicated above.  [  ] Unable to assess ROS because     PHYSICAL EXAM:          CONSTITUTIONAL: No acute distress  	SKIN: warm, dry  	EYES: PERRL, EOM, no conj injection, sclera clear  	NECK: JVP mid neck   	CARD: Regular rate and rhythm, no murmurs, rubs or gallops  	RESP: CTA B/L; no wheezes, rales or rhonchi.  	ABD: Soft, not tender, not distended, bowel sounds present  	EXT: 2+ pulses on all extremities, no clubbing, cyanosis or edema  	NEURO: A+Ox3, strength intact throughout    Assessment   s/p OPCABx4 POD 0 currently progressing well.     Plan:    Neuro:  Multimodal pain management  Tylenol, Lidoderm patch, gabapentin, opiates as needed  Monitor neuro status in the post op period    CV:  S/P OPCAB  Monitor perfusion, , lactate, UOP  Vasodilatory shock requiring Levophed infusion - wean off for MAP > 65  ASA  Beta blockers when able  Amio ppx per protocol  Remove chest tubes when able    Resp:  Acute hypoxic respiratory failure post surgery requiring mechanical ventilation - wean to extubate  Supplemental oxygen to maintain sats > 90% post extubation  Continuous pulse oximetry monitoring  IS / Chest PT  OOBTC and Ambulate once extubated  Nebulizers as needed    GI:  Heart healthy diet when extubated  Bowel Regimen - escalate as needed  PUD ppx per protocol    Renal  High risk for BRENT post surgery  Monitor UOP, lytes, creatinine  Diuretics as needed for negative fluid balance  Keep K > 4, Mg > 2    Endo:  Tight glucose control per CTICU protocl    Heme:  Acute blood loss anemia post surgery  High risk for thrombocytopenia  DVT prophylaxis once bleeding risk post surgery is minimized    ID:  Perioperative prophylactic abx per protocol  Monitor fever curve and WBC count  Remove TLC when no longer indicated    Upon my evaluation, the above patient has a high probability of imminent or life threatening deterioration due to a high risk for shock, cardiogenic shock, arrhythmias, acute blood loss, acute kidney injury and acute pulmonary insufficiency which required my direct attention, intervention, and personal management.  Total time spent includes review of radiology results, ordering, interpreting and reviewing diagnostic studies / lab tests with immediate assessment and treatment, consultant collaboration on findings and treatment options, monitoring for potential decompensation, obtaining history from family, EMT, nursing home staff and/or treating physicians; directing the titration of pressors, oxygen support devices, fluid resuscitation, interpretation of cardiac output measurements, interpretation of radiological assessments, interpretation of EKG / rhythm strips, telemetry.  This time did not overlap with the management of other patients or performing separately reportable procedures.      Management of this patient was discussed with the CT surgery attending and mid-level providers.    I acknowledge the use of copied documentation.  All copy forward documentation is my own and has been reviewed and revised as appropriate.  If no changes were made, it is because that information remains the same.

## 2025-04-04 NOTE — DISCHARGE NOTE PROVIDER - NSDCMRMEDTOKEN_GEN_ALL_CORE_FT
lisinopril 40 mg oral tablet: 1 tab(s) orally once a day PATIENT NON COMPLIANT WITH MEDICATION   Albuterol (Eqv-ProAir HFA) 90 mcg/inh inhalation aerosol: 2 puff(s) inhaled every 6 hours  aspirin 325 mg oral delayed release tablet: 1 tab(s) orally once a day  atorvastatin 40 mg oral tablet: 1 tab(s) orally once a day (at bedtime)  Atrovent HFA 17 mcg/inh inhalation aerosol: 2 inhaled every 6 hours  clopidogrel 75 mg oral tablet: 1 tab(s) orally once a day  famotidine 20 mg oral tablet: 1 tab(s) orally 2 times a day  ferrous sulfate 325 mg (65 mg elemental iron) oral tablet: 1 tab(s) orally once a day  folic acid 1 mg oral tablet: 1 tab(s) orally once a day  guaiFENesin 600 mg oral tablet, extended release: 1 tab(s) orally every 12 hours  Lasix 20 mg oral tablet: 1 tab(s) orally once a day  metoprolol tartrate 25 mg oral tablet: 0.5 tab(s) orally 2 times a day  Multiple Vitamins with Minerals oral tablet: 1 tab(s) orally once a day  oxyCODONE 5 mg oral tablet: 1 tab(s) orally every 4 hours as needed for Moderate Pain (4 - 6) MDD: 6  polyethylene glycol 3350 oral powder for reconstitution: 17 gram(s) orally once a day  potassium chloride 10 mEq oral tablet, extended release: 1 tab(s) orally once a day  senna leaf extract oral tablet: 1 tab(s) orally once a day (at bedtime)

## 2025-04-04 NOTE — DISCHARGE NOTE PROVIDER - NSDCFUADDINST_GEN_ALL_CORE_FT
I did not order this test.... dr salcedo did... vascular ROSALBA without exercise is fine   Activities/Restrictions  1.	Do not – drive, lift, pull or push anything over 10 pounds for 8 weeks.  2.	Shower every night and carefully wash wound, pat dry.  Cover is wound is draining with dry sterile dressing. No baths or swimming for two months.   3.	Apply support stockings /ace wraps to legs as soon as you get out of bed in the morning, remove in evening.   4.	Do progressive walking exercises every day, gradually increasing to 30 to 40 minutes/day, five days a week and incentive spirometer 10 times every hour while awake  5.	DO NOT DRIVE OR CONSUME ALCOHOL WHILE TAKING PAIN MEDICATION.  Contact your Physician promptly if:  1.	 Signs of wound infection, such as increasing redness, swelling, pain or drainage from incision.  2.	Progressive shortness of breath or increasing difficulty with breathing when lying down.  3.	Excessive nausea, vomiting, diarrhea or coughing.  4.	Increase swelling of legs that does not resolve with leg elevation.  5.	Chest pain that spreads to arms, jaw or back or sudden development of numbness, weakness, difficulty speaking or facial droop – Call 911.  6.	Diabetics who are unable to keep finger stick glucose under 150 for three consecutive readings.  Instructions:  1.	 Keep a daily log for Temperature, pulse, blood pressure, and weight twice a day and Glucose if diabetic with each meal. Call office for Temp > 101, pulse greater than 110 or less than 55, BP first # greater than 160 or less than 100, 3 pound weight gain in 1 day or 5 pounds in 3 days  2.	Hold pillow to chest and grab elbows when you need to cough.

## 2025-04-04 NOTE — BRIEF OPERATIVE NOTE - NSICDXBRIEFPOSTOP_GEN_ALL_CORE_FT
POST-OP DIAGNOSIS:  Angina pectoris 04-Apr-2025 13:21:50  Jimmy Mckinney  Triple vessel coronary artery disease 04-Apr-2025 13:21:40  Jimmy Mckinney  Abnormal screening cardiac CT 04-Apr-2025 13:22:12  Jimmy Mckinney

## 2025-04-04 NOTE — BRIEF OPERATIVE NOTE - NSICDXBRIEFPREOP_GEN_ALL_CORE_FT
PRE-OP DIAGNOSIS:  Triple vessel coronary artery disease 04-Apr-2025 13:21:06  Jimmy Mckinney  Angina pectoris 04-Apr-2025 13:20:56  Jimmy Mckinney  Abnormal screening cardiac CT 04-Apr-2025 13:21:18  Jimmy Mckinney

## 2025-04-04 NOTE — DISCHARGE NOTE PROVIDER - PROVIDER TOKENS
PROVIDER:[TOKEN:[[id number]]],PROVIDER:[TOKEN:[[id number]]] PROVIDER:[TOKEN:[[id number]],SCHEDULEDAPPT:[04/16/2025],SCHEDULEDAPPTTIME:[10:30 AM]],PROVIDER:[TOKEN:[[id number]]]

## 2025-04-04 NOTE — DISCHARGE NOTE PROVIDER - HOSPITAL COURSE
Patient is 55 year-old male former smoker with a past medical history HTN, HLD, and family history of CAD presented to the hospital for an elective diagnostic cardiac catheterization. Patient was seen at the cardiologist's office complaining of intermittent bilateral jaw pain radiating down to the upper left chest. Patient underwent a CCTA showing a calcium score of 768 and multivessel disease. Patient was referred for a LHC. Patient underwent LHC revealing 3vCAD. On 04/04/25, he underwent surgical myocardial revascularization. Post-operatively, Patient is 55 year-old male former smoker with a past medical history HTN, HLD, and family history of CAD presented to the hospital for an elective diagnostic cardiac catheterization. Patient was seen at the cardiologist's office complaining of intermittent bilateral jaw pain radiating down to the upper left chest. Patient underwent a CCTA showing a calcium score of 768 and multivessel disease. Patient was referred for a LHC. Patient underwent LHC revealing 3vCAD. On 04/04/25, he underwent surgical myocardial revascularization. Post-operatively, his course was essentially uneventful except for bibasilar opacities for which he was started on antibiotics for possible pneumonia which were discontinued prior to discharge and right pleural effusion for which a thoracentesis was preformed extracting 900cc of blood tinged fluid. Discharge cxr revealed small reaccumulation of fluid, which was unchanged from day prior. He had no leukocytosis, pulse ox of 98% on room air and incentive spirometer of 2 liters. Will give lasix for 1 week with K and repeat CXR prior to office visit.      A discussion was conducted with the patient regarding the importance and benefits of participating in a cardiothoracic rehabilitation program. Contact information given to the patient. Patient instructed to inquire further upon seeing their cardiologist post op.

## 2025-04-04 NOTE — DISCHARGE NOTE PROVIDER - NSDCFUSCHEDAPPT_GEN_ALL_CORE_FT
Jimmy Mckinney  [facility]  [facility] [facility]  Scheduled Appointment: 04/16/2025

## 2025-04-05 LAB
ALBUMIN SERPL ELPH-MCNC: 3.9 G/DL — SIGNIFICANT CHANGE UP (ref 3.5–5.2)
ALP SERPL-CCNC: 52 U/L — SIGNIFICANT CHANGE UP (ref 30–115)
ALT FLD-CCNC: 11 U/L — SIGNIFICANT CHANGE UP (ref 0–41)
ANION GAP SERPL CALC-SCNC: 12 MMOL/L — SIGNIFICANT CHANGE UP (ref 7–14)
AST SERPL-CCNC: 18 U/L — SIGNIFICANT CHANGE UP (ref 0–41)
BASOPHILS # BLD AUTO: 0.02 K/UL — SIGNIFICANT CHANGE UP (ref 0–0.2)
BASOPHILS NFR BLD AUTO: 0.2 % — SIGNIFICANT CHANGE UP (ref 0–1)
BILIRUB SERPL-MCNC: 0.5 MG/DL — SIGNIFICANT CHANGE UP (ref 0.2–1.2)
BUN SERPL-MCNC: 16 MG/DL — SIGNIFICANT CHANGE UP (ref 10–20)
CALCIUM SERPL-MCNC: 8.4 MG/DL — SIGNIFICANT CHANGE UP (ref 8.4–10.5)
CHLORIDE SERPL-SCNC: 102 MMOL/L — SIGNIFICANT CHANGE UP (ref 98–110)
CO2 SERPL-SCNC: 21 MMOL/L — SIGNIFICANT CHANGE UP (ref 17–32)
CREAT SERPL-MCNC: 0.9 MG/DL — SIGNIFICANT CHANGE UP (ref 0.7–1.5)
EGFR: 100 ML/MIN/1.73M2 — SIGNIFICANT CHANGE UP
EGFR: 100 ML/MIN/1.73M2 — SIGNIFICANT CHANGE UP
EOSINOPHIL # BLD AUTO: 0.01 K/UL — SIGNIFICANT CHANGE UP (ref 0–0.7)
EOSINOPHIL NFR BLD AUTO: 0.1 % — SIGNIFICANT CHANGE UP (ref 0–8)
GAS PNL BLDA: SIGNIFICANT CHANGE UP
GLUCOSE BLDC GLUCOMTR-MCNC: 108 MG/DL — HIGH (ref 70–99)
GLUCOSE BLDC GLUCOMTR-MCNC: 108 MG/DL — HIGH (ref 70–99)
GLUCOSE BLDC GLUCOMTR-MCNC: 123 MG/DL — HIGH (ref 70–99)
GLUCOSE BLDC GLUCOMTR-MCNC: 133 MG/DL — HIGH (ref 70–99)
GLUCOSE BLDC GLUCOMTR-MCNC: 185 MG/DL — HIGH (ref 70–99)
GLUCOSE BLDC GLUCOMTR-MCNC: 212 MG/DL — HIGH (ref 70–99)
GLUCOSE SERPL-MCNC: 102 MG/DL — HIGH (ref 70–99)
HCT VFR BLD CALC: 33.2 % — LOW (ref 42–52)
HGB BLD-MCNC: 11.6 G/DL — LOW (ref 14–18)
IMM GRANULOCYTES NFR BLD AUTO: 0.5 % — HIGH (ref 0.1–0.3)
LYMPHOCYTES # BLD AUTO: 0.99 K/UL — LOW (ref 1.2–3.4)
LYMPHOCYTES # BLD AUTO: 7.7 % — LOW (ref 20.5–51.1)
MAGNESIUM SERPL-MCNC: 1.9 MG/DL — SIGNIFICANT CHANGE UP (ref 1.8–2.4)
MCHC RBC-ENTMCNC: 30.8 PG — SIGNIFICANT CHANGE UP (ref 27–31)
MCHC RBC-ENTMCNC: 34.9 G/DL — SIGNIFICANT CHANGE UP (ref 32–37)
MCV RBC AUTO: 88.1 FL — SIGNIFICANT CHANGE UP (ref 80–94)
MONOCYTES # BLD AUTO: 1.36 K/UL — HIGH (ref 0.1–0.6)
MONOCYTES NFR BLD AUTO: 10.5 % — HIGH (ref 1.7–9.3)
NEUTROPHILS # BLD AUTO: 10.48 K/UL — HIGH (ref 1.4–6.5)
NEUTROPHILS NFR BLD AUTO: 81 % — HIGH (ref 42.2–75.2)
NRBC BLD AUTO-RTO: 0 /100 WBCS — SIGNIFICANT CHANGE UP (ref 0–0)
PLATELET # BLD AUTO: 231 K/UL — SIGNIFICANT CHANGE UP (ref 130–400)
PMV BLD: 11.3 FL — HIGH (ref 7.4–10.4)
POTASSIUM SERPL-MCNC: 4.4 MMOL/L — SIGNIFICANT CHANGE UP (ref 3.5–5)
POTASSIUM SERPL-SCNC: 4.4 MMOL/L — SIGNIFICANT CHANGE UP (ref 3.5–5)
PROT SERPL-MCNC: 5.7 G/DL — LOW (ref 6–8)
RBC # BLD: 3.77 M/UL — LOW (ref 4.7–6.1)
RBC # FLD: 12.4 % — SIGNIFICANT CHANGE UP (ref 11.5–14.5)
SODIUM SERPL-SCNC: 135 MMOL/L — SIGNIFICANT CHANGE UP (ref 135–146)
WBC # BLD: 12.92 K/UL — HIGH (ref 4.8–10.8)
WBC # FLD AUTO: 12.92 K/UL — HIGH (ref 4.8–10.8)

## 2025-04-05 PROCEDURE — 71045 X-RAY EXAM CHEST 1 VIEW: CPT | Mod: 26

## 2025-04-05 PROCEDURE — 99291 CRITICAL CARE FIRST HOUR: CPT

## 2025-04-05 PROCEDURE — 71045 X-RAY EXAM CHEST 1 VIEW: CPT | Mod: 26,77

## 2025-04-05 PROCEDURE — 93010 ELECTROCARDIOGRAM REPORT: CPT

## 2025-04-05 RX ORDER — SODIUM CHLORIDE 9 G/1000ML
1000 INJECTION, SOLUTION INTRAVENOUS
Refills: 0 | Status: DISCONTINUED | OUTPATIENT
Start: 2025-04-05 | End: 2025-04-10

## 2025-04-05 RX ORDER — ACETAMINOPHEN 500 MG/5ML
1000 LIQUID (ML) ORAL ONCE
Refills: 0 | Status: COMPLETED | OUTPATIENT
Start: 2025-04-05 | End: 2025-04-05

## 2025-04-05 RX ORDER — MAGNESIUM SULFATE 500 MG/ML
1 SYRINGE (ML) INJECTION ONCE
Refills: 0 | Status: COMPLETED | OUTPATIENT
Start: 2025-04-05 | End: 2025-04-05

## 2025-04-05 RX ORDER — INSULIN LISPRO 100 U/ML
INJECTION, SOLUTION INTRAVENOUS; SUBCUTANEOUS
Refills: 0 | Status: DISCONTINUED | OUTPATIENT
Start: 2025-04-05 | End: 2025-04-10

## 2025-04-05 RX ORDER — ENOXAPARIN SODIUM 100 MG/ML
40 INJECTION SUBCUTANEOUS EVERY 24 HOURS
Refills: 0 | Status: DISCONTINUED | OUTPATIENT
Start: 2025-04-05 | End: 2025-04-11

## 2025-04-05 RX ORDER — ACETAMINOPHEN 500 MG/5ML
1000 LIQUID (ML) ORAL ONCE
Refills: 0 | Status: COMPLETED | OUTPATIENT
Start: 2025-04-06 | End: 2025-04-06

## 2025-04-05 RX ORDER — ACETAMINOPHEN 500 MG/5ML
1000 LIQUID (ML) ORAL ONCE
Refills: 0 | Status: DISCONTINUED | OUTPATIENT
Start: 2025-04-06 | End: 2025-04-06

## 2025-04-05 RX ORDER — IPRATROPIUM BROMIDE AND ALBUTEROL SULFATE .5; 2.5 MG/3ML; MG/3ML
3 SOLUTION RESPIRATORY (INHALATION) EVERY 6 HOURS
Refills: 0 | Status: DISCONTINUED | OUTPATIENT
Start: 2025-04-05 | End: 2025-04-11

## 2025-04-05 RX ORDER — HYDROMORPHONE/SOD CHLOR,ISO/PF 2 MG/10 ML
0.5 SYRINGE (ML) INJECTION ONCE
Refills: 0 | Status: DISCONTINUED | OUTPATIENT
Start: 2025-04-05 | End: 2025-04-05

## 2025-04-05 RX ORDER — FUROSEMIDE 10 MG/ML
20 INJECTION INTRAMUSCULAR; INTRAVENOUS DAILY
Refills: 0 | Status: DISCONTINUED | OUTPATIENT
Start: 2025-04-05 | End: 2025-04-07

## 2025-04-05 RX ORDER — MELATONIN 5 MG
5 TABLET ORAL AT BEDTIME
Refills: 0 | Status: DISCONTINUED | OUTPATIENT
Start: 2025-04-05 | End: 2025-04-11

## 2025-04-05 RX ORDER — GLUCAGON 3 MG/1
1 POWDER NASAL ONCE
Refills: 0 | Status: DISCONTINUED | OUTPATIENT
Start: 2025-04-05 | End: 2025-04-11

## 2025-04-05 RX ORDER — KETOROLAC TROMETHAMINE 30 MG/ML
30 INJECTION, SOLUTION INTRAMUSCULAR; INTRAVENOUS ONCE
Refills: 0 | Status: DISCONTINUED | OUTPATIENT
Start: 2025-04-05 | End: 2025-04-05

## 2025-04-05 RX ORDER — INSULIN LISPRO 100 U/ML
INJECTION, SOLUTION INTRAVENOUS; SUBCUTANEOUS AT BEDTIME
Refills: 0 | Status: DISCONTINUED | OUTPATIENT
Start: 2025-04-05 | End: 2025-04-10

## 2025-04-05 RX ORDER — IBUPROFEN 200 MG
400 TABLET ORAL ONCE
Refills: 0 | Status: COMPLETED | OUTPATIENT
Start: 2025-04-05 | End: 2025-04-05

## 2025-04-05 RX ORDER — ATORVASTATIN CALCIUM 80 MG/1
40 TABLET, FILM COATED ORAL AT BEDTIME
Refills: 0 | Status: DISCONTINUED | OUTPATIENT
Start: 2025-04-05 | End: 2025-04-11

## 2025-04-05 RX ORDER — DEXTROSE 50 % IN WATER 50 %
15 SYRINGE (ML) INTRAVENOUS ONCE
Refills: 0 | Status: DISCONTINUED | OUTPATIENT
Start: 2025-04-05 | End: 2025-04-10

## 2025-04-05 RX ADMIN — OXYCODONE HYDROCHLORIDE 5 MILLIGRAM(S): 30 TABLET ORAL at 08:06

## 2025-04-05 RX ADMIN — Medication 325 MILLIGRAM(S): at 11:13

## 2025-04-05 RX ADMIN — Medication 100 MILLIGRAM(S): at 17:42

## 2025-04-05 RX ADMIN — Medication 1000 MILLIGRAM(S): at 06:12

## 2025-04-05 RX ADMIN — Medication 400 MILLIGRAM(S): at 17:29

## 2025-04-05 RX ADMIN — ENOXAPARIN SODIUM 40 MILLIGRAM(S): 100 INJECTION SUBCUTANEOUS at 13:24

## 2025-04-05 RX ADMIN — Medication 1 TABLET(S): at 21:38

## 2025-04-05 RX ADMIN — Medication 400 MILLIGRAM(S): at 05:12

## 2025-04-05 RX ADMIN — Medication 5 MILLIGRAM(S): at 21:39

## 2025-04-05 RX ADMIN — OXYCODONE HYDROCHLORIDE 5 MILLIGRAM(S): 30 TABLET ORAL at 08:36

## 2025-04-05 RX ADMIN — ATORVASTATIN CALCIUM 40 MILLIGRAM(S): 80 TABLET, FILM COATED ORAL at 21:38

## 2025-04-05 RX ADMIN — Medication 400 MILLIGRAM(S): at 14:30

## 2025-04-05 RX ADMIN — Medication 1000 MILLIGRAM(S): at 22:38

## 2025-04-05 RX ADMIN — Medication 100 MILLIGRAM(S): at 10:58

## 2025-04-05 RX ADMIN — Medication 400 MILLIGRAM(S): at 17:59

## 2025-04-05 RX ADMIN — Medication 250 MILLIGRAM(S): at 17:41

## 2025-04-05 RX ADMIN — Medication 0.5 MILLIGRAM(S): at 01:25

## 2025-04-05 RX ADMIN — FUROSEMIDE 20 MILLIGRAM(S): 10 INJECTION INTRAMUSCULAR; INTRAVENOUS at 21:39

## 2025-04-05 RX ADMIN — Medication 0.5 MILLIGRAM(S): at 02:25

## 2025-04-05 RX ADMIN — Medication 20 MILLIGRAM(S): at 17:42

## 2025-04-05 RX ADMIN — INSULIN LISPRO 1: 100 INJECTION, SOLUTION INTRAVENOUS; SUBCUTANEOUS at 16:37

## 2025-04-05 RX ADMIN — IPRATROPIUM BROMIDE AND ALBUTEROL SULFATE 3 MILLILITER(S): .5; 2.5 SOLUTION RESPIRATORY (INHALATION) at 14:45

## 2025-04-05 RX ADMIN — FOLIC ACID 1 MILLIGRAM(S): 1 TABLET ORAL at 11:13

## 2025-04-05 RX ADMIN — Medication 250 MILLIGRAM(S): at 05:12

## 2025-04-05 RX ADMIN — GABAPENTIN 300 MILLIGRAM(S): 400 CAPSULE ORAL at 13:24

## 2025-04-05 RX ADMIN — LIDOCAINE HYDROCHLORIDE 1 PATCH: 20 JELLY TOPICAL at 07:34

## 2025-04-05 RX ADMIN — Medication 1000 MILLIGRAM(S): at 15:00

## 2025-04-05 RX ADMIN — Medication 20 MILLIEQUIVALENT(S): at 21:38

## 2025-04-05 RX ADMIN — GABAPENTIN 300 MILLIGRAM(S): 400 CAPSULE ORAL at 21:38

## 2025-04-05 RX ADMIN — Medication 100 GRAM(S): at 06:25

## 2025-04-05 RX ADMIN — KETOROLAC TROMETHAMINE 30 MILLIGRAM(S): 30 INJECTION, SOLUTION INTRAMUSCULAR; INTRAVENOUS at 08:10

## 2025-04-05 RX ADMIN — Medication 400 MILLIGRAM(S): at 21:38

## 2025-04-05 RX ADMIN — Medication 20 MILLIGRAM(S): at 05:12

## 2025-04-05 RX ADMIN — Medication 5 MILLIGRAM(S): at 17:43

## 2025-04-05 RX ADMIN — Medication 100 MILLIGRAM(S): at 03:17

## 2025-04-05 RX ADMIN — Medication 1 TABLET(S): at 16:02

## 2025-04-05 RX ADMIN — LIDOCAINE HYDROCHLORIDE 1 PATCH: 20 JELLY TOPICAL at 11:21

## 2025-04-05 RX ADMIN — KETOROLAC TROMETHAMINE 30 MILLIGRAM(S): 30 INJECTION, SOLUTION INTRAMUSCULAR; INTRAVENOUS at 08:40

## 2025-04-05 RX ADMIN — POLYETHYLENE GLYCOL 3350 17 GRAM(S): 17 POWDER, FOR SOLUTION ORAL at 11:14

## 2025-04-05 RX ADMIN — OXYCODONE HYDROCHLORIDE 5 MILLIGRAM(S): 30 TABLET ORAL at 14:11

## 2025-04-05 RX ADMIN — IPRATROPIUM BROMIDE AND ALBUTEROL SULFATE 3 MILLILITER(S): .5; 2.5 SOLUTION RESPIRATORY (INHALATION) at 20:23

## 2025-04-05 RX ADMIN — Medication 500 MILLIGRAM(S): at 11:13

## 2025-04-05 RX ADMIN — OXYCODONE HYDROCHLORIDE 5 MILLIGRAM(S): 30 TABLET ORAL at 14:41

## 2025-04-05 RX ADMIN — INSULIN LISPRO 0: 100 INJECTION, SOLUTION INTRAVENOUS; SUBCUTANEOUS at 22:08

## 2025-04-05 NOTE — PROGRESS NOTE ADULT - SUBJECTIVE AND OBJECTIVE BOX
OPERATIVE PROCEDURE(s):  cabg              POD #1    SURGEON(s): dina    SUBJECTIVE ASSESSMENT: no complaints    Vital Signs Last 24 Hrs  T(C): 37.7 (05 Apr 2025 04:00), Max: 37.7 (04 Apr 2025 20:00)  T(F): 99.9 (05 Apr 2025 04:00), Max: 99.9 (04 Apr 2025 20:00)  HR: 80 (05 Apr 2025 07:00) (79 - 99)  BP: 118/67 (05 Apr 2025 07:00) (95/59 - 127/63)  BP(mean): 87 (05 Apr 2025 07:00) (72 - 95)  RR: 33 (05 Apr 2025 07:00) (6 - 34)  SpO2: 94% (05 Apr 2025 07:00) (94% - 100%)    Parameters below as of 05 Apr 2025 07:00  Patient On (Oxygen Delivery Method): nasal cannula w/ humidification  O2 Flow (L/min): 4    04-04-25 @ 07:01  -  04-05-25 @ 07:00  --------------------------------------------------------  IN: 1422 mL / OUT: 1475 mL / NET: -53 mL        Physical Exam  General:  Chest:  CVS:  Abd:   GI/ :  Ext:    Central Venous Catheter: Yes[ ]  No[ ] , If Yes indication:           Day #    Rivero Catheter: Yes  [ ] , No [ ] : If yes indication:                         Day #    NGT: Yes [ ] No [  ]     If Yes Placement:                                          Day #    Post-Op Beta-Blockers: Yes [ ], No[ ], If No, then contraindication:    CHEST TUBE:  [ ] YES [ ] NO  OUTPUT:     per 24 hours    AIR LEAKS:  [ ] YES [ ] NO      EPICARDIAL WIRES:  [ ] YES [ ] NO      BOWEL MOVEMENT:  [ ] YES [ ] NO          LABS:                        11.6   12.92 )-----------( 231      ( 05 Apr 2025 03:58 )             33.2     COUMADIN:   [ ] YES [ ] NO    PT/INR - ( 04 Apr 2025 13:58 )   PT: 13.10 sec;   INR: 1.11 ratio         PTT - ( 04 Apr 2025 13:58 )  PTT:25.6 sec  04-05    135  |  102  |  16  ----------------------------<  102[H]  4.4   |  21  |  0.9    Ca    8.4      05 Apr 2025 03:58  Mg     1.9     04-05    TPro  5.7[L]  /  Alb  3.9  /  TBili  0.5  /  DBili  x   /  AST  18  /  ALT  11  /  AlkPhos  52  04-05    Urinalysis Basic - ( 05 Apr 2025 03:58 )    Color: x / Appearance: x / SG: x / pH: x  Gluc: 102 mg/dL / Ketone: x  / Bili: x / Urobili: x   Blood: x / Protein: x / Nitrite: x   Leuk Esterase: x / RBC: x / WBC x   Sq Epi: x / Non Sq Epi: x / Bacteria: x        MEDICATIONS  (STANDING):  albumin human  5% IVPB 3000 milliLiter(s) IV Intermittent once  albuterol    90 MICROgram(s) HFA Inhaler 2 Puff(s) Inhalation every 6 hours  ascorbic acid 500 milliGRAM(s) Oral daily  aspirin enteric coated 325 milliGRAM(s) Oral daily  bisacodyl 5 milliGRAM(s) Oral every 12 hours  ceFAZolin   IVPB 2000 milliGRAM(s) IV Intermittent every 8 hours  chlorhexidine 0.12% Liquid 15 milliLiter(s) Oral Mucosa every 12 hours  chlorhexidine 2% Cloths 1 Application(s) Topical daily  dexMEDEtomidine Infusion 0.25 MICROgram(s)/kG/Hr (4.94 mL/Hr) IV Continuous <Continuous>  dextrose 50% Injectable 50 milliLiter(s) IV Push every 15 minutes  famotidine Injectable 20 milliGRAM(s) IV Push every 12 hours  ferrous    sulfate 325 milliGRAM(s) Oral daily  folic acid 1 milliGRAM(s) Oral daily  gabapentin 300 milliGRAM(s) Oral every 8 hours  insulin regular Infusion 1 Unit(s)/Hr (1 mL/Hr) IV Continuous <Continuous>  ipratropium 17 MICROgram(s) HFA Inhaler 2 Puff(s) Inhalation every 6 hours  lidocaine   4% Patch 1 Patch Transdermal every 24 hours  multivitamin/minerals 1 Tablet(s) Oral daily  niCARdipine Infusion 5 mG/Hr (25 mL/Hr) IV Continuous <Continuous>  nitroglycerin  Infusion 5 MICROgram(s)/Min (1.5 mL/Hr) IV Continuous <Continuous>  norepinephrine Infusion 0.05 MICROgram(s)/kG/Min (7.41 mL/Hr) IV Continuous <Continuous>  ondansetron Injectable 4 milliGRAM(s) IV Push once  polyethylene glycol 3350 17 Gram(s) Oral daily  propofol Infusion 15 MICROgram(s)/kG/Min (7.11 mL/Hr) IV Continuous <Continuous>  senna 1 Tablet(s) Oral at bedtime  sodium chloride 0.9%. 1000 milliLiter(s) (150 mL/Hr) IV Continuous <Continuous>  sodium chloride 0.9%. 1000 milliLiter(s) (20 mL/Hr) IV Continuous <Continuous>  vancomycin  IVPB 1000 milliGRAM(s) IV Intermittent every 12 hours    MEDICATIONS  (PRN):  acetaminophen     Tablet .. 650 milliGRAM(s) Oral every 6 hours PRN Temp greater or equal to 38C (100.4F), Mild Pain (1 - 3)  oxyCODONE    IR 5 milliGRAM(s) Oral every 4 hours PRN Moderate Pain (4 - 6)  oxyCODONE    IR 10 milliGRAM(s) Oral every 4 hours PRN Severe Pain (7 - 10)      Allergies    No Known Allergies    Intolerances        Ambulation/Activity Status:        RADIOLOGY & ADDITIONAL TESTS:        Assessment/Plan: Patient is a 54 yo male s/p cabg pod # 1  cont present tx as per ct surgeon  s/p cabg- cont asa  dvt/gi ppx  encourage is and ambulation with pt  pain management  d/c chest tubes    Social Service Disposition:     OPERATIVE PROCEDURE(s):  cabg              POD #1    SURGEON(s): dina    SUBJECTIVE ASSESSMENT: no complaints other than incisional chest pain that responds to pain meds; pt feels better now that the chest tubes are out    Vital Signs Last 24 Hrs  T(C): 37.7 (05 Apr 2025 04:00), Max: 37.7 (04 Apr 2025 20:00)  T(F): 99.9 (05 Apr 2025 04:00), Max: 99.9 (04 Apr 2025 20:00)  HR: 80 (05 Apr 2025 07:00) (79 - 99)  BP: 118/67 (05 Apr 2025 07:00) (95/59 - 127/63)  BP(mean): 87 (05 Apr 2025 07:00) (72 - 95)  RR: 33 (05 Apr 2025 07:00) (6 - 34)  SpO2: 94% (05 Apr 2025 07:00) (94% - 100%)    Parameters below as of 05 Apr 2025 07:00  Patient On (Oxygen Delivery Method): nasal cannula w/ humidification  O2 Flow (L/min): 4    04-04-25 @ 07:01  -  04-05-25 @ 07:00  --------------------------------------------------------  IN: 1422 mL / OUT: 1475 mL / NET: -53 mL        Physical Exam  General: alert and oriented x 3  Chest: cta bl  CVS: s1s2  Abd: pos bs soft nt  GI/ :  Ext: no sig edema  incisions - dressed  sternum-intact    Central Venous Catheter: Yes[ x]  No[ ] , If Yes indication:           Day #    Rivero Catheter: Yes  [x ] , No [ ] : If yes indication:                         Day #    NGT: Yes [ ] No [ x ]     If Yes Placement:                                          Day #    Post-Op Beta-Blockers: Yes [ ], No[x ], If No, then contraindication: hypotension    CHEST TUBE:  [ ] YES [ x] NO  OUTPUT:     per 24 hours    AIR LEAKS:  [ ] YES [ ] NO      EPICARDIAL WIRES:  [x ] YES [ ] NO      BOWEL MOVEMENT:  [ ] YES [ x] NO          LABS:                        11.6   12.92 )-----------( 231      ( 05 Apr 2025 03:58 )             33.2     COUMADIN:   [ ] YES [ x] NO    PT/INR - ( 04 Apr 2025 13:58 )   PT: 13.10 sec;   INR: 1.11 ratio         PTT - ( 04 Apr 2025 13:58 )  PTT:25.6 sec  04-05    135  |  102  |  16  ----------------------------<  102[H]  4.4   |  21  |  0.9    Ca    8.4      05 Apr 2025 03:58  Mg     1.9     04-05    TPro  5.7[L]  /  Alb  3.9  /  TBili  0.5  /  DBili  x   /  AST  18  /  ALT  11  /  AlkPhos  52  04-05    Urinalysis Basic - ( 05 Apr 2025 03:58 )    Color: x / Appearance: x / SG: x / pH: x  Gluc: 102 mg/dL / Ketone: x  / Bili: x / Urobili: x   Blood: x / Protein: x / Nitrite: x   Leuk Esterase: x / RBC: x / WBC x   Sq Epi: x / Non Sq Epi: x / Bacteria: x        MEDICATIONS  (STANDING):  albumin human  5% IVPB 3000 milliLiter(s) IV Intermittent once  albuterol    90 MICROgram(s) HFA Inhaler 2 Puff(s) Inhalation every 6 hours  ascorbic acid 500 milliGRAM(s) Oral daily  aspirin enteric coated 325 milliGRAM(s) Oral daily  bisacodyl 5 milliGRAM(s) Oral every 12 hours  ceFAZolin   IVPB 2000 milliGRAM(s) IV Intermittent every 8 hours  chlorhexidine 0.12% Liquid 15 milliLiter(s) Oral Mucosa every 12 hours  chlorhexidine 2% Cloths 1 Application(s) Topical daily  dexMEDEtomidine Infusion 0.25 MICROgram(s)/kG/Hr (4.94 mL/Hr) IV Continuous <Continuous>  dextrose 50% Injectable 50 milliLiter(s) IV Push every 15 minutes  famotidine Injectable 20 milliGRAM(s) IV Push every 12 hours  ferrous    sulfate 325 milliGRAM(s) Oral daily  folic acid 1 milliGRAM(s) Oral daily  gabapentin 300 milliGRAM(s) Oral every 8 hours  insulin regular Infusion 1 Unit(s)/Hr (1 mL/Hr) IV Continuous <Continuous>  ipratropium 17 MICROgram(s) HFA Inhaler 2 Puff(s) Inhalation every 6 hours  lidocaine   4% Patch 1 Patch Transdermal every 24 hours  multivitamin/minerals 1 Tablet(s) Oral daily  niCARdipine Infusion 5 mG/Hr (25 mL/Hr) IV Continuous <Continuous>  nitroglycerin  Infusion 5 MICROgram(s)/Min (1.5 mL/Hr) IV Continuous <Continuous>  norepinephrine Infusion 0.05 MICROgram(s)/kG/Min (7.41 mL/Hr) IV Continuous <Continuous>  ondansetron Injectable 4 milliGRAM(s) IV Push once  polyethylene glycol 3350 17 Gram(s) Oral daily  propofol Infusion 15 MICROgram(s)/kG/Min (7.11 mL/Hr) IV Continuous <Continuous>  senna 1 Tablet(s) Oral at bedtime  sodium chloride 0.9%. 1000 milliLiter(s) (150 mL/Hr) IV Continuous <Continuous>  sodium chloride 0.9%. 1000 milliLiter(s) (20 mL/Hr) IV Continuous <Continuous>  vancomycin  IVPB 1000 milliGRAM(s) IV Intermittent every 12 hours    MEDICATIONS  (PRN):  acetaminophen     Tablet .. 650 milliGRAM(s) Oral every 6 hours PRN Temp greater or equal to 38C (100.4F), Mild Pain (1 - 3)  oxyCODONE    IR 5 milliGRAM(s) Oral every 4 hours PRN Moderate Pain (4 - 6)  oxyCODONE    IR 10 milliGRAM(s) Oral every 4 hours PRN Severe Pain (7 - 10)      Allergies    No Known Allergies    Intolerances        Ambulation/Activity Status:    amb well with pt    RADIOLOGY & ADDITIONAL TESTS:    ACC: [id number] EXAM:  XR CHEST PORTABLE IMMED 1V   ORDERED BY: SHADI BLANCO     PROCEDURE DATE:  04/05/2025      INTERPRETATION:  CLINICAL HISTORY: Shortness of breath, status post chest   tube removal    COMPARISON: Chest radiograph 4/5/2025 5:46 AM.    TECHNIQUE: Portable frontal chest radiograph.    FINDINGS:    Support devices: Interval removal of a right chest tube. Right IJ sheath   with tip in the superior vena cava.    Cardiac/mediastinum/hilum: Unchanged.    Lung parenchyma/Pleura: Right midlung opacity. Unchanged left basilar   opacities/effusion. No pneumothorax.    Skeleton/soft tissues: Unchanged.      IMPRESSION:    Right midlung opacity.    Unchanged left basilar opacities/effusion.    --- End of Report ---      Assessment/Plan: Patient is a 54 yo male s/p cabg pod # 1  cont present tx as per ct surgeon  s/p cabg- cont asa, statin, no b blocker due to hypotension  dvt/gi ppx  encourage is and ambulation with pt  pain management  d/c chest tubes and femoral arterial line- pt tolerated procedure well ; post chest tube removal cxr did not demonstrate a ptx; official report pending     Social Service Disposition:  home in a few days

## 2025-04-05 NOTE — PROGRESS NOTE ADULT - SUBJECTIVE AND OBJECTIVE BOX
CTU Attending Progress Daily Note     04 Apr 2025 17:12  Procedure: OPCABx4  POD#: 1    He has history of HTN (hypertension)    Anxiety, Stress, and Tension    Depression    Dyslipidemia    [place] (hard of hearing)    Former smoker      HPI: Patient is 55 year-old male former smoker with a past medical history HTN, HLD, and family history of CAD presented to the hospital for an elective diagnostic cardiac catheterization. Patient was seen at the cardiologist's office complaining of intermittent bilateral jaw pain radiating down to the upper left chest. Patient underwent a CCTA showing a calcium score of 768 and multivessel disease. Patient was referred for a LHC. Denies chest palpitations, lightheadedness, dizziness, syncope, nausea, vomiting, and bilateral leg swelling. Today, patient underwent LHC revealing 3vCAD. CT Surgery was consulted for an evaluation of myocardial revascularization via bypass grafts.     OR Data  Procedure: off pump CABGx4  Bypass: N/A  Cross Clamp: N/A   Pre LV Fxn: 50-55%      RV Fxn: nml  Post LV Fxn: 50-55%     RV Fxn: nml  Blood Products: None  Cell Saver: 228 cc  Fluids: 2.5L crystalloid/500 cc Albumin  Pacing Wires: A/V    Hospital Course:  4/4: Arrived intubated on Levophed. Extubated in short course and weaned off pressors    OBJECTIVE:  ICU Vital Signs Last 24 Hrs  T(C): 37 (04 Apr 2025 14:00), Max: 37 (04 Apr 2025 14:00)  T(F): 98.6 (04 Apr 2025 14:00), Max: 98.6 (04 Apr 2025 14:00)  HR: 80 (04 Apr 2025 16:15) (46 - 99)  BP: 107/65 (04 Apr 2025 16:00) (105/62 - 141/82)  BP(mean): 81 (04 Apr 2025 16:00) (79 - 105)  ABP: 119/70 (04 Apr 2025 16:15) (63/38 - 179/103)  ABP(mean): 85 (04 Apr 2025 16:15) (46 - 128)  RR: 16 (04 Apr 2025 16:15) (9 - 34)  SpO2: 94% (04 Apr 2025 16:15) (94% - 100%)    O2 Parameters below as of 04 Apr 2025 15:31  Patient On (Oxygen Delivery Method): mask, aerosol  O2 Flow (L/min): 7  O2 Concentration (%): 40    OBJECTIVE:  ICU Vital Signs Last 24 Hrs  T(C): 38 (05 Apr 2025 08:00), Max: 38 (05 Apr 2025 08:00)  T(F): 100.4 (05 Apr 2025 08:00), Max: 100.4 (05 Apr 2025 08:00)  HR: 89 (05 Apr 2025 15:00) (70 - 89)  BP: 110/58 (05 Apr 2025 15:00) (90/56 - 124/76)  BP(mean): 78 (05 Apr 2025 15:00) (66 - 95)  ABP: 110/49 (05 Apr 2025 08:15) (63/38 - 132/65)  ABP(mean): 65 (05 Apr 2025 08:15) (46 - 89)  RR: 34 (05 Apr 2025 15:00) (6 - 34)  SpO2: 95% (05 Apr 2025 15:00) (91% - 98%)    O2 Parameters below as of 05 Apr 2025 15:00  Patient On (Oxygen Delivery Method): nasal cannula w/ humidification  O2 Flow (L/min): 4        I&O's Summary    04 Apr 2025 07:01  -  05 Apr 2025 07:00  --------------------------------------------------------  IN: 1422 mL / OUT: 1475 mL / NET: -53 mL    05 Apr 2025 07:01  -  05 Apr 2025 15:34  --------------------------------------------------------  IN: 560 mL / OUT: 282 mL / NET: 278 mL      I&O's Detail    04 Apr 2025 07:01  -  05 Apr 2025 07:00  --------------------------------------------------------  IN:    Dexmedetomidine: 14 mL    Insulin: 23 mL    IV PiggyBack: 50 mL    IV PiggyBack: 250 mL    IV PiggyBack: 200 mL    IV PiggyBack: 500 mL    NiCARdipine: 25 mL    sodium chloride 0.9%: 360 mL  Total IN: 1422 mL    OUT:    Chest Tube (mL): 120 mL    Chest Tube (mL): 250 mL    Indwelling Catheter - Urethral (mL): 1105 mL  Total OUT: 1475 mL    Total NET: -53 mL      05 Apr 2025 07:01  -  05 Apr 2025 15:34  --------------------------------------------------------  IN:    IV PiggyBack: 200 mL    Oral Fluid: 360 mL  Total IN: 560 mL    OUT:    Chest Tube (mL): 10 mL    Chest Tube (mL): 2 mL    Indwelling Catheter - Urethral (mL): 270 mL  Total OUT: 282 mL    Total NET: 278 mL          CAPILLARY BLOOD GLUCOSE      POCT Blood Glucose.: 133 mg/dL (05 Apr 2025 10:56)  POCT Blood Glucose.: 123 mg/dL (05 Apr 2025 08:02)  POCT Blood Glucose.: 108 mg/dL (05 Apr 2025 03:29)  POCT Blood Glucose.: 108 mg/dL (05 Apr 2025 01:38)  POCT Blood Glucose.: 120 mg/dL (04 Apr 2025 22:33)  POCT Blood Glucose.: 119 mg/dL (04 Apr 2025 20:28)  POCT Blood Glucose.: 101 mg/dL (04 Apr 2025 18:09)  POCT Blood Glucose.: 130 mg/dL (04 Apr 2025 15:49)    LABS:  ABG - ( 05 Apr 2025 04:39 )  pH, Arterial: 7.40  pH, Blood: x     /  pCO2: 36    /  pO2: 86    / HCO3: 22    / Base Excess: -2.1  /  SaO2: 98.2                                    11.6   12.92 )-----------( 231      ( 05 Apr 2025 03:58 )             33.2     04-05    135  |  102  |  16  ----------------------------<  102[H]  4.4   |  21  |  0.9    Ca    8.4      05 Apr 2025 03:58  Mg     1.9     04-05    TPro  5.7[L]  /  Alb  3.9  /  TBili  0.5  /  DBili  x   /  AST  18  /  ALT  11  /  AlkPhos  52  04-05    PT/INR - ( 04 Apr 2025 13:58 )   PT: 13.10 sec;   INR: 1.11 ratio         PTT - ( 04 Apr 2025 13:58 )  PTT:25.6 sec  Urinalysis Basic - ( 05 Apr 2025 03:58 )    Color: x / Appearance: x / SG: x / pH: x  Gluc: 102 mg/dL / Ketone: x  / Bili: x / Urobili: x   Blood: x / Protein: x / Nitrite: x   Leuk Esterase: x / RBC: x / WBC x   Sq Epi: x / Non Sq Epi: x / Bacteria: x        Home Medications:  lisinopril 40 mg oral tablet: 1 tab(s) orally once a day PATIENT NON COMPLIANT WITH MEDICATION (02 Apr 2025 09:30)    HOSPITAL MEDICATIONS:  MEDICATIONS  (STANDING):  acetaminophen   IVPB .. 1000 milliGRAM(s) IV Intermittent once  acetaminophen   IVPB .. 1000 milliGRAM(s) IV Intermittent once  albumin human  5% IVPB 3000 milliLiter(s) IV Intermittent once  albuterol/ipratropium for Nebulization 3 milliLiter(s) Nebulizer every 6 hours  ascorbic acid 500 milliGRAM(s) Oral daily  aspirin enteric coated 325 milliGRAM(s) Oral daily  atorvastatin 40 milliGRAM(s) Oral at bedtime  bisacodyl 5 milliGRAM(s) Oral every 12 hours  ceFAZolin   IVPB 2000 milliGRAM(s) IV Intermittent every 8 hours  chlorhexidine 2% Cloths 1 Application(s) Topical daily  dextrose 5%. 1000 milliLiter(s) (50 mL/Hr) IV Continuous <Continuous>  dextrose 5%. 1000 milliLiter(s) (100 mL/Hr) IV Continuous <Continuous>  dextrose 50% Injectable 50 milliLiter(s) IV Push every 15 minutes  enoxaparin Injectable 40 milliGRAM(s) SubCutaneous every 24 hours  famotidine    Tablet 20 milliGRAM(s) Oral two times a day  ferrous    sulfate 325 milliGRAM(s) Oral daily  folic acid 1 milliGRAM(s) Oral daily  gabapentin 300 milliGRAM(s) Oral every 8 hours  glucagon  Injectable 1 milliGRAM(s) IntraMuscular once  insulin lispro (ADMELOG) corrective regimen sliding scale   SubCutaneous three times a day before meals  insulin lispro (ADMELOG) corrective regimen sliding scale   SubCutaneous at bedtime  lidocaine   4% Patch 1 Patch Transdermal every 24 hours  multivitamin/minerals 1 Tablet(s) Oral daily  polyethylene glycol 3350 17 Gram(s) Oral daily  senna 1 Tablet(s) Oral at bedtime  vancomycin  IVPB 1000 milliGRAM(s) IV Intermittent every 12 hours    MEDICATIONS  (PRN):  acetaminophen     Tablet .. 650 milliGRAM(s) Oral every 6 hours PRN Temp greater or equal to 38C (100.4F), Mild Pain (1 - 3)  dextrose Oral Gel 15 Gram(s) Oral once PRN Blood Glucose LESS THAN 70 milliGRAM(s)/deciliter  oxyCODONE    IR 5 milliGRAM(s) Oral every 4 hours PRN Moderate Pain (4 - 6)  oxyCODONE    IR 10 milliGRAM(s) Oral every 4 hours PRN Severe Pain (7 - 10)    RADIOLOGY:  Chest X-ray Reviewed    REVIEW OF SYSTEMS:  CONSTITUTIONAL: [X] all negative; [ ] weakness, [ ] fevers, [ ] chills  EYES/ENT: [X] all negative; [ ] visual changes, [ ] vertigo, [ ] throat pain   NECK: [X] all negative; [ ] pain, [ ] stiffness  RESPIRATORY: [] all negative, [ ] cough, [ ] wheezing, [ ] hemoptysis, [ ] shortness of breath  CARDIOVASCULAR: [] all negative; [ ] chest pain, [ ] palpitations, [ ] orthopnea  GASTROINTESTINAL: [X] all negative; [ ]abdominal pain, [ ] nausea, [ ] vomiting, [ ] hematemesis, [ ] diarrhea, [ ] constipation, [ ] melena, [ ] hematochezia.  GENITOURINARY: [X] all negative; [ ] dysuria, [ ] frequency, [ ] hematuria  NEUROLOGICAL: [X] all negative; [ ] numbness, [ ] weakness  SKIN: [X] all negative; [ ] itching, [ ] burning, [ ] rashes, [ ] lesions   All other review of systems is negative unless indicated above.  [  ] Unable to assess ROS because         RADIOLOGY:  Chest X-ray Reviewed    REVIEW OF SYSTEMS:  CONSTITUTIONAL: [X] all negative; [ ] weakness, [ ] fevers, [ ] chills  EYES/ENT: [X] all negative; [ ] visual changes, [ ] vertigo, [ ] throat pain   NECK: [X] all negative; [ ] pain, [ ] stiffness  RESPIRATORY: [] all negative, [ ] cough, [ ] wheezing, [ ] hemoptysis, [ ] shortness of breath  CARDIOVASCULAR: [] all negative; [ ] chest pain, [ ] palpitations, [ ] orthopnea  GASTROINTESTINAL: [X] all negative; [ ]abdominal pain, [ ] nausea, [ ] vomiting, [ ] hematemesis, [ ] diarrhea, [ ] constipation, [ ] melena, [ ] hematochezia.  GENITOURINARY: [X] all negative; [ ] dysuria, [ ] frequency, [ ] hematuria  NEUROLOGICAL: [X] all negative; [ ] numbness, [ ] weakness  SKIN: [X] all negative; [ ] itching, [ ] burning, [ ] rashes, [ ] lesions   All other review of systems is negative unless indicated above.  [  ] Unable to assess ROS because     PHYSICAL EXAM:          CONSTITUTIONAL: No acute distress  	SKIN: warm, dry  	EYES: PERRL, EOM, no conj injection, sclera clear  	NECK: JVP mid neck   	CARD: Regular rate and rhythm, no murmurs, rubs or gallops  	RESP: CTA B/L; no wheezes, rales or rhonchi.  	ABD: Soft, not tender, not distended, bowel sounds present  	EXT: 2+ pulses on all extremities, no clubbing, cyanosis or edema  	NEURO: A+Ox3, strength intact throughout    Assessment   s/p OPCABx4 POD 0 currently progressing well.     Plan:    Neuro:  Multimodal pain management  Tylenol, Lidoderm patch, gabapentin, opiates as needed  Monitor neuro status in the post op period    CV:  S/P OPCAB  Monitor perfusion, , lactate, UOP  Vasodilatory shock requiring Levophed infusion - wean off for MAP > 65  ASA Satatins  Beta blockers when able  Remove chest tubes when able    Resp:  Acute hypoxic respiratory failure post surgery requiring mechanical ventilation - wean to extubate  Supplemental oxygen to maintain sats > 90% post extubation  Continuous pulse oximetry monitoring  IS / Chest PT  OOBTC and Ambulate once extubated  Nebulizers as needed    GI:  Heart healthy diet when extubated  Bowel Regimen - escalate as needed  PUD ppx per protocol    Renal  High risk for BRENT post surgery  Monitor UOP, lytes, creatinine  Diuretics as needed for negative fluid balance  Keep K > 4, Mg > 2    Endo:  Tight glucose control per CTICU protocl    Heme:  Acute blood loss anemia post surgery  High risk for thrombocytopenia  DVT prophylaxis once bleeding risk post surgery is minimized    ID:  Perioperative prophylactic abx per protocol  Monitor fever curve and WBC count  Remove TLC when no longer indicated    up in chair ambulate  Upon my evaluation, the above patient has a high probability of imminent or life threatening deterioration due to a high risk for shock, cardiogenic shock, arrhythmias, acute blood loss, acute kidney injury and acute pulmonary insufficiency which required my direct attention, intervention, and personal management.  Total time spent includes review of radiology results, ordering, interpreting and reviewing diagnostic studies / lab tests with immediate assessment and treatment, consultant collaboration on findings and treatment options, monitoring for potential decompensation, obtaining history from family, EMT, nursing home staff and/or treating physicians; directing the titration of pressors, oxygen support devices, fluid resuscitation, interpretation of cardiac output measurements, interpretation of radiological assessments, interpretation of EKG / rhythm strips, telemetry.  This time did not overlap with the management of other patients or performing separately reportable procedures.      Management of this patient was discussed with the CT surgery attending and mid-level providers.    I acknowledge the use of copied documentation.  All copy forward documentation is my own and has been reviewed and revised as appropriate.  If no changes were made, it is because that information remains the same.

## 2025-04-06 LAB
ALBUMIN SERPL ELPH-MCNC: 3.3 G/DL — LOW (ref 3.5–5.2)
ALP SERPL-CCNC: 62 U/L — SIGNIFICANT CHANGE UP (ref 30–115)
ALT FLD-CCNC: 43 U/L — HIGH (ref 0–41)
ANION GAP SERPL CALC-SCNC: 7 MMOL/L — SIGNIFICANT CHANGE UP (ref 7–14)
AST SERPL-CCNC: 54 U/L — HIGH (ref 0–41)
BASOPHILS # BLD AUTO: 0.03 K/UL — SIGNIFICANT CHANGE UP (ref 0–0.2)
BASOPHILS NFR BLD AUTO: 0.3 % — SIGNIFICANT CHANGE UP (ref 0–1)
BILIRUB SERPL-MCNC: 0.4 MG/DL — SIGNIFICANT CHANGE UP (ref 0.2–1.2)
BUN SERPL-MCNC: 26 MG/DL — HIGH (ref 10–20)
CALCIUM SERPL-MCNC: 8.3 MG/DL — LOW (ref 8.4–10.5)
CHLORIDE SERPL-SCNC: 102 MMOL/L — SIGNIFICANT CHANGE UP (ref 98–110)
CO2 SERPL-SCNC: 25 MMOL/L — SIGNIFICANT CHANGE UP (ref 17–32)
CREAT SERPL-MCNC: 1.1 MG/DL — SIGNIFICANT CHANGE UP (ref 0.7–1.5)
EGFR: 78 ML/MIN/1.73M2 — SIGNIFICANT CHANGE UP
EGFR: 78 ML/MIN/1.73M2 — SIGNIFICANT CHANGE UP
EOSINOPHIL # BLD AUTO: 0 K/UL — SIGNIFICANT CHANGE UP (ref 0–0.7)
EOSINOPHIL NFR BLD AUTO: 0 % — SIGNIFICANT CHANGE UP (ref 0–8)
GLUCOSE BLDC GLUCOMTR-MCNC: 126 MG/DL — HIGH (ref 70–99)
GLUCOSE BLDC GLUCOMTR-MCNC: 128 MG/DL — HIGH (ref 70–99)
GLUCOSE BLDC GLUCOMTR-MCNC: 146 MG/DL — HIGH (ref 70–99)
GLUCOSE BLDC GLUCOMTR-MCNC: 148 MG/DL — HIGH (ref 70–99)
GLUCOSE BLDC GLUCOMTR-MCNC: 161 MG/DL — HIGH (ref 70–99)
GLUCOSE SERPL-MCNC: 144 MG/DL — HIGH (ref 70–99)
HCT VFR BLD CALC: 25.6 % — LOW (ref 42–52)
HGB BLD-MCNC: 9 G/DL — LOW (ref 14–18)
IMM GRANULOCYTES NFR BLD AUTO: 0.7 % — HIGH (ref 0.1–0.3)
LYMPHOCYTES # BLD AUTO: 1.16 K/UL — LOW (ref 1.2–3.4)
LYMPHOCYTES # BLD AUTO: 11.2 % — LOW (ref 20.5–51.1)
MAGNESIUM SERPL-MCNC: 2.1 MG/DL — SIGNIFICANT CHANGE UP (ref 1.8–2.4)
MCHC RBC-ENTMCNC: 31.1 PG — HIGH (ref 27–31)
MCHC RBC-ENTMCNC: 35.2 G/DL — SIGNIFICANT CHANGE UP (ref 32–37)
MCV RBC AUTO: 88.6 FL — SIGNIFICANT CHANGE UP (ref 80–94)
MONOCYTES # BLD AUTO: 0.95 K/UL — HIGH (ref 0.1–0.6)
MONOCYTES NFR BLD AUTO: 9.2 % — SIGNIFICANT CHANGE UP (ref 1.7–9.3)
NEUTROPHILS # BLD AUTO: 8.14 K/UL — HIGH (ref 1.4–6.5)
NEUTROPHILS NFR BLD AUTO: 78.6 % — HIGH (ref 42.2–75.2)
NRBC BLD AUTO-RTO: 0 /100 WBCS — SIGNIFICANT CHANGE UP (ref 0–0)
PLATELET # BLD AUTO: 159 K/UL — SIGNIFICANT CHANGE UP (ref 130–400)
PMV BLD: 10.9 FL — HIGH (ref 7.4–10.4)
POTASSIUM SERPL-MCNC: 4.1 MMOL/L — SIGNIFICANT CHANGE UP (ref 3.5–5)
POTASSIUM SERPL-SCNC: 4.1 MMOL/L — SIGNIFICANT CHANGE UP (ref 3.5–5)
PROT SERPL-MCNC: 5 G/DL — LOW (ref 6–8)
RBC # BLD: 2.89 M/UL — LOW (ref 4.7–6.1)
RBC # FLD: 12.3 % — SIGNIFICANT CHANGE UP (ref 11.5–14.5)
SODIUM SERPL-SCNC: 134 MMOL/L — LOW (ref 135–146)
WBC # BLD: 10.35 K/UL — SIGNIFICANT CHANGE UP (ref 4.8–10.8)
WBC # FLD AUTO: 10.35 K/UL — SIGNIFICANT CHANGE UP (ref 4.8–10.8)

## 2025-04-06 PROCEDURE — 93010 ELECTROCARDIOGRAM REPORT: CPT

## 2025-04-06 PROCEDURE — 71045 X-RAY EXAM CHEST 1 VIEW: CPT | Mod: 26

## 2025-04-06 PROCEDURE — 99233 SBSQ HOSP IP/OBS HIGH 50: CPT

## 2025-04-06 RX ORDER — IBUPROFEN 200 MG
400 TABLET ORAL EVERY 8 HOURS
Refills: 0 | Status: DISCONTINUED | OUTPATIENT
Start: 2025-04-06 | End: 2025-04-11

## 2025-04-06 RX ORDER — CLOPIDOGREL BISULFATE 75 MG/1
75 TABLET, FILM COATED ORAL DAILY
Refills: 0 | Status: DISCONTINUED | OUTPATIENT
Start: 2025-04-07 | End: 2025-04-11

## 2025-04-06 RX ORDER — FUROSEMIDE 10 MG/ML
20 INJECTION INTRAMUSCULAR; INTRAVENOUS ONCE
Refills: 0 | Status: COMPLETED | OUTPATIENT
Start: 2025-04-06 | End: 2025-04-06

## 2025-04-06 RX ORDER — METOPROLOL SUCCINATE 50 MG/1
12.5 TABLET, EXTENDED RELEASE ORAL EVERY 12 HOURS
Refills: 0 | Status: DISCONTINUED | OUTPATIENT
Start: 2025-04-06 | End: 2025-04-11

## 2025-04-06 RX ADMIN — GABAPENTIN 300 MILLIGRAM(S): 400 CAPSULE ORAL at 13:20

## 2025-04-06 RX ADMIN — METOPROLOL SUCCINATE 12.5 MILLIGRAM(S): 50 TABLET, EXTENDED RELEASE ORAL at 12:51

## 2025-04-06 RX ADMIN — OXYCODONE HYDROCHLORIDE 10 MILLIGRAM(S): 30 TABLET ORAL at 03:13

## 2025-04-06 RX ADMIN — ATORVASTATIN CALCIUM 40 MILLIGRAM(S): 80 TABLET, FILM COATED ORAL at 21:42

## 2025-04-06 RX ADMIN — IPRATROPIUM BROMIDE AND ALBUTEROL SULFATE 3 MILLILITER(S): .5; 2.5 SOLUTION RESPIRATORY (INHALATION) at 19:24

## 2025-04-06 RX ADMIN — Medication 400 MILLIGRAM(S): at 20:14

## 2025-04-06 RX ADMIN — Medication 20 MILLIEQUIVALENT(S): at 09:37

## 2025-04-06 RX ADMIN — FOLIC ACID 1 MILLIGRAM(S): 1 TABLET ORAL at 11:30

## 2025-04-06 RX ADMIN — LIDOCAINE HYDROCHLORIDE 1 PATCH: 20 JELLY TOPICAL at 22:44

## 2025-04-06 RX ADMIN — METOPROLOL SUCCINATE 12.5 MILLIGRAM(S): 50 TABLET, EXTENDED RELEASE ORAL at 18:50

## 2025-04-06 RX ADMIN — Medication 500 MILLIGRAM(S): at 11:29

## 2025-04-06 RX ADMIN — Medication 1 TABLET(S): at 21:42

## 2025-04-06 RX ADMIN — ENOXAPARIN SODIUM 40 MILLIGRAM(S): 100 INJECTION SUBCUTANEOUS at 11:29

## 2025-04-06 RX ADMIN — Medication 1 APPLICATION(S): at 05:38

## 2025-04-06 RX ADMIN — Medication 5 MILLIGRAM(S): at 17:01

## 2025-04-06 RX ADMIN — OXYCODONE HYDROCHLORIDE 5 MILLIGRAM(S): 30 TABLET ORAL at 16:35

## 2025-04-06 RX ADMIN — OXYCODONE HYDROCHLORIDE 5 MILLIGRAM(S): 30 TABLET ORAL at 13:04

## 2025-04-06 RX ADMIN — Medication 325 MILLIGRAM(S): at 11:30

## 2025-04-06 RX ADMIN — Medication 400 MILLIGRAM(S): at 06:57

## 2025-04-06 RX ADMIN — GABAPENTIN 300 MILLIGRAM(S): 400 CAPSULE ORAL at 05:37

## 2025-04-06 RX ADMIN — Medication 20 MILLIEQUIVALENT(S): at 11:30

## 2025-04-06 RX ADMIN — POLYETHYLENE GLYCOL 3350 17 GRAM(S): 17 POWDER, FOR SOLUTION ORAL at 11:32

## 2025-04-06 RX ADMIN — Medication 5 MILLIGRAM(S): at 21:42

## 2025-04-06 RX ADMIN — OXYCODONE HYDROCHLORIDE 10 MILLIGRAM(S): 30 TABLET ORAL at 02:13

## 2025-04-06 RX ADMIN — Medication 400 MILLIGRAM(S): at 05:57

## 2025-04-06 RX ADMIN — FUROSEMIDE 20 MILLIGRAM(S): 10 INJECTION INTRAMUSCULAR; INTRAVENOUS at 09:54

## 2025-04-06 RX ADMIN — IPRATROPIUM BROMIDE AND ALBUTEROL SULFATE 3 MILLILITER(S): .5; 2.5 SOLUTION RESPIRATORY (INHALATION) at 09:32

## 2025-04-06 RX ADMIN — Medication 325 MILLIGRAM(S): at 11:32

## 2025-04-06 RX ADMIN — OXYCODONE HYDROCHLORIDE 5 MILLIGRAM(S): 30 TABLET ORAL at 12:14

## 2025-04-06 RX ADMIN — Medication 40 MILLIEQUIVALENT(S): at 05:38

## 2025-04-06 RX ADMIN — Medication 5 MILLIGRAM(S): at 05:37

## 2025-04-06 RX ADMIN — IPRATROPIUM BROMIDE AND ALBUTEROL SULFATE 3 MILLILITER(S): .5; 2.5 SOLUTION RESPIRATORY (INHALATION) at 14:57

## 2025-04-06 RX ADMIN — GABAPENTIN 300 MILLIGRAM(S): 400 CAPSULE ORAL at 21:42

## 2025-04-06 RX ADMIN — Medication 20 MILLIGRAM(S): at 05:37

## 2025-04-06 RX ADMIN — Medication 1 TABLET(S): at 11:29

## 2025-04-06 RX ADMIN — IPRATROPIUM BROMIDE AND ALBUTEROL SULFATE 3 MILLILITER(S): .5; 2.5 SOLUTION RESPIRATORY (INHALATION) at 01:20

## 2025-04-06 RX ADMIN — Medication 20 MILLIGRAM(S): at 17:01

## 2025-04-06 RX ADMIN — Medication 100 MILLIGRAM(S): at 03:02

## 2025-04-06 NOTE — PHYSICAL THERAPY INITIAL EVALUATION ADULT - GAIT DEVIATIONS NOTED, PT EVAL
steady gait ; c/o mild incisional pain during activity/decreased agatha/decreased velocity of limb motion

## 2025-04-06 NOTE — PROGRESS NOTE ADULT - ASSESSMENT
Assessment/Plan:  CAD-s/p CABG x 4-POD #2  1-BP control-beta-blockers  2-serum glucose control-insulin sliding scale correction regimen  3-fluid overload-diuresis  2-WOM-gupgeyxd statin  5-transaminasemia-monitor daily, if increases, will hold statin  6-acute blood loss anemia-stable, monitor Hb/Hct daily    40 minutes of critical care services provided

## 2025-04-06 NOTE — PROGRESS NOTE ADULT - SUBJECTIVE AND OBJECTIVE BOX
OPERATIVE PROCEDURE(s):   cabg x 4             POD # 2    SURGEON(s): dina      SUBJECTIVE ASSESSMENT:    Vital Signs Last 24 Hrs  T(C): 38.2 (06 Apr 2025 08:00), Max: 39.2 (05 Apr 2025 16:00)  T(F): 100.8 (06 Apr 2025 08:00), Max: 102.6 (05 Apr 2025 16:00)  HR: 89 (06 Apr 2025 08:00) (70 - 89)  BP: 113/66 (06 Apr 2025 08:00) (90/56 - 119/64)  BP(mean): 84 (06 Apr 2025 08:00) (66 - 86)  RR: 30 (06 Apr 2025 08:00) (12 - 38)  SpO2: 96% (06 Apr 2025 08:00) (89% - 97%)    Parameters below as of 06 Apr 2025 08:00  Patient On (Oxygen Delivery Method): nasal cannula w/ humidification  O2 Flow (L/min): 6    04-05-25 @ 07:01  -  04-06-25 @ 07:00  --------------------------------------------------------  IN: 1480 mL / OUT: 1312 mL / NET: 168 mL    04-06-25 @ 07:01  -  04-06-25 @ 09:05  --------------------------------------------------------  IN: 120 mL / OUT: 65 mL / NET: 55 mL            LABS:                        9.0    10.35 )-----------( 159      ( 06 Apr 2025 01:54 )             25.6     COUMADIN:   [ ] YES [ ] NO    PT/INR - ( 04 Apr 2025 13:58 )   PT: 13.10 sec;   INR: 1.11 ratio         PTT - ( 04 Apr 2025 13:58 )  PTT:25.6 sec  04-06    134[L]  |  102  |  26[H]  ----------------------------<  144[H]  4.1   |  25  |  1.1    Ca    8.3[L]      06 Apr 2025 01:54  Mg     2.1     04-06    TPro  5.0[L]  /  Alb  3.3[L]  /  TBili  0.4  /  DBili  x   /  AST  54[H]  /  ALT  43[H]  /  AlkPhos  62  04-06    Urinalysis Basic - ( 06 Apr 2025 01:54 )    Color: x / Appearance: x / SG: x / pH: x  Gluc: 144 mg/dL / Ketone: x  / Bili: x / Urobili: x   Blood: x / Protein: x / Nitrite: x   Leuk Esterase: x / RBC: x / WBC x   Sq Epi: x / Non Sq Epi: x / Bacteria: x        MEDICATIONS  (STANDING):  albumin human  5% IVPB 3000 milliLiter(s) IV Intermittent once  albuterol/ipratropium for Nebulization 3 milliLiter(s) Nebulizer every 6 hours  ascorbic acid 500 milliGRAM(s) Oral daily  aspirin enteric coated 325 milliGRAM(s) Oral daily  atorvastatin 40 milliGRAM(s) Oral at bedtime  bisacodyl 5 milliGRAM(s) Oral every 12 hours  chlorhexidine 2% Cloths 1 Application(s) Topical daily  dextrose 5%. 1000 milliLiter(s) (50 mL/Hr) IV Continuous <Continuous>  dextrose 5%. 1000 milliLiter(s) (100 mL/Hr) IV Continuous <Continuous>  dextrose 50% Injectable 50 milliLiter(s) IV Push every 15 minutes  enoxaparin Injectable 40 milliGRAM(s) SubCutaneous every 24 hours  famotidine    Tablet 20 milliGRAM(s) Oral two times a day  ferrous    sulfate 325 milliGRAM(s) Oral daily  folic acid 1 milliGRAM(s) Oral daily  furosemide   Injectable 20 milliGRAM(s) IV Push daily  gabapentin 300 milliGRAM(s) Oral every 8 hours  glucagon  Injectable 1 milliGRAM(s) IntraMuscular once  insulin lispro (ADMELOG) corrective regimen sliding scale   SubCutaneous three times a day before meals  insulin lispro (ADMELOG) corrective regimen sliding scale   SubCutaneous at bedtime  lidocaine   4% Patch 1 Patch Transdermal every 24 hours  multivitamin/minerals 1 Tablet(s) Oral daily  polyethylene glycol 3350 17 Gram(s) Oral daily  potassium chloride    Tablet ER 20 milliEquivalent(s) Oral daily  senna 1 Tablet(s) Oral at bedtime    MEDICATIONS  (PRN):  dextrose Oral Gel 15 Gram(s) Oral once PRN Blood Glucose LESS THAN 70 milliGRAM(s)/deciliter  ibuprofen  Tablet. 400 milliGRAM(s) Oral every 8 hours PRN Temp greater or equal to 38C (100.4F)  melatonin 5 milliGRAM(s) Oral at bedtime PRN Insomnia  oxyCODONE    IR 5 milliGRAM(s) Oral every 4 hours PRN Moderate Pain (4 - 6)  oxyCODONE    IR 10 milliGRAM(s) Oral every 4 hours PRN Severe Pain (7 - 10)      Allergies    No Known Allergies    Intolerances        Ambulation/Activity Status:        RADIOLOGY & ADDITIONAL TESTS:        Assessment/Plan:    Social Service Disposition:     OPERATIVE PROCEDURE(s):   cabg x 4             POD # 2    SURGEON(s): dina      SUBJECTIVE ASSESSMENT:pt complains of pain that responds to pain meds     Vital Signs Last 24 Hrs  T(C): 38.2 (06 Apr 2025 08:00), Max: 39.2 (05 Apr 2025 16:00)  T(F): 100.8 (06 Apr 2025 08:00), Max: 102.6 (05 Apr 2025 16:00)  HR: 89 (06 Apr 2025 08:00) (70 - 89)  BP: 113/66 (06 Apr 2025 08:00) (90/56 - 119/64)  BP(mean): 84 (06 Apr 2025 08:00) (66 - 86)  RR: 30 (06 Apr 2025 08:00) (12 - 38)  SpO2: 96% (06 Apr 2025 08:00) (89% - 97%)    Parameters below as of 06 Apr 2025 08:00  Patient On (Oxygen Delivery Method): nasal cannula w/ humidification  O2 Flow (L/min): 6    04-05-25 @ 07:01  -  04-06-25 @ 07:00  --------------------------------------------------------  IN: 1480 mL / OUT: 1312 mL / NET: 168 mL    04-06-25 @ 07:01  -  04-06-25 @ 09:05  --------------------------------------------------------  IN: 120 mL / OUT: 65 mL / NET: 55 mL            LABS:                        9.0    10.35 )-----------( 159      ( 06 Apr 2025 01:54 )             25.6     COUMADIN:   [ ] YES [ ] NO    PT/INR - ( 04 Apr 2025 13:58 )   PT: 13.10 sec;   INR: 1.11 ratio         PTT - ( 04 Apr 2025 13:58 )  PTT:25.6 sec  04-06    134[L]  |  102  |  26[H]  ----------------------------<  144[H]  4.1   |  25  |  1.1    Ca    8.3[L]      06 Apr 2025 01:54  Mg     2.1     04-06    TPro  5.0[L]  /  Alb  3.3[L]  /  TBili  0.4  /  DBili  x   /  AST  54[H]  /  ALT  43[H]  /  AlkPhos  62  04-06    Urinalysis Basic - ( 06 Apr 2025 01:54 )    Color: x / Appearance: x / SG: x / pH: x  Gluc: 144 mg/dL / Ketone: x  / Bili: x / Urobili: x   Blood: x / Protein: x / Nitrite: x   Leuk Esterase: x / RBC: x / WBC x   Sq Epi: x / Non Sq Epi: x / Bacteria: x    Physical Exam  General: alert and oriented x 3  Chest: cta bl  CVS: s1s2  Abd: pos bs soft nt  GI/ :  Ext: no sig edema  incisions - dressed  sternum-intact    MEDICATIONS  (STANDING):  albumin human  5% IVPB 3000 milliLiter(s) IV Intermittent once  albuterol/ipratropium for Nebulization 3 milliLiter(s) Nebulizer every 6 hours  ascorbic acid 500 milliGRAM(s) Oral daily  aspirin enteric coated 325 milliGRAM(s) Oral daily  atorvastatin 40 milliGRAM(s) Oral at bedtime  bisacodyl 5 milliGRAM(s) Oral every 12 hours  chlorhexidine 2% Cloths 1 Application(s) Topical daily  dextrose 5%. 1000 milliLiter(s) (50 mL/Hr) IV Continuous <Continuous>  dextrose 5%. 1000 milliLiter(s) (100 mL/Hr) IV Continuous <Continuous>  dextrose 50% Injectable 50 milliLiter(s) IV Push every 15 minutes  enoxaparin Injectable 40 milliGRAM(s) SubCutaneous every 24 hours  famotidine    Tablet 20 milliGRAM(s) Oral two times a day  ferrous    sulfate 325 milliGRAM(s) Oral daily  folic acid 1 milliGRAM(s) Oral daily  furosemide   Injectable 20 milliGRAM(s) IV Push daily  gabapentin 300 milliGRAM(s) Oral every 8 hours  glucagon  Injectable 1 milliGRAM(s) IntraMuscular once  insulin lispro (ADMELOG) corrective regimen sliding scale   SubCutaneous three times a day before meals  insulin lispro (ADMELOG) corrective regimen sliding scale   SubCutaneous at bedtime  lidocaine   4% Patch 1 Patch Transdermal every 24 hours  multivitamin/minerals 1 Tablet(s) Oral daily  polyethylene glycol 3350 17 Gram(s) Oral daily  potassium chloride    Tablet ER 20 milliEquivalent(s) Oral daily  senna 1 Tablet(s) Oral at bedtime    MEDICATIONS  (PRN):  dextrose Oral Gel 15 Gram(s) Oral once PRN Blood Glucose LESS THAN 70 milliGRAM(s)/deciliter  ibuprofen  Tablet. 400 milliGRAM(s) Oral every 8 hours PRN Temp greater or equal to 38C (100.4F)  melatonin 5 milliGRAM(s) Oral at bedtime PRN Insomnia  oxyCODONE    IR 5 milliGRAM(s) Oral every 4 hours PRN Moderate Pain (4 - 6)  oxyCODONE    IR 10 milliGRAM(s) Oral every 4 hours PRN Severe Pain (7 - 10)      Allergies    No Known Allergies    Intolerances    Ambulation/Activity Status:  amb well with pt      RADIOLOGY & ADDITIONAL TESTS:  ACC: [id number] EXAM:  XR CHEST PORTABLE ROUTINE 1V   ORDERED BY: LYLY REYEZ     PROCEDURE DATE:  04/06/2025          INTERPRETATION:  CLINICAL HISTORY: Coronary artery disease    COMPARISON: Prior day.    TECHNIQUE: Frontal portable, low lung volumes    FINDINGS:    Support devices: Telemetry leads are seen. Right neck central catheter.    Cardiac/mediastinum/hilum: Cardiomegaly. Status post median sternotomy.    Lung parenchyma/Pleura: Bilateral opacifications and effusions.    Skeleton/soft tissues: Stable.    IMPRESSION:    Cardiomegaly. Bilateral opacifications and effusions. Worsened.    --- End of Report ---      Assessment/Plan: Patient is a 56 yo male s/p cabg pod # 2  cont present tx as per ct surgeon  s/p cabg- cont asa, statin, start b blocker today  dvt/gi ppx  encourage is and ambulation with pt  pain management  mildly elevated lft's - will d/c statin at the present time     Social Service Disposition:  home in a few days

## 2025-04-06 NOTE — PHYSICAL THERAPY INITIAL EVALUATION ADULT - GENERAL OBSERVATIONS, REHAB EVAL
[id number] pm Chart reviewed. Pt. seen out of bed in bedside recliner , in No apparent distress , + telemetry , IV lock , right IJ  cordis cath, O2 at 4L/min nc , pacing wires, compression socks. Pt. alert and oriented X 4, Pt. agreed to activity/therapy.  LENCHO Aranda present.

## 2025-04-06 NOTE — PROGRESS NOTE ADULT - SUBJECTIVE AND OBJECTIVE BOX
ESTRADA ALANIS  MRN#: [id number]  Subjective:  Patient was seen and evalauted on AM rounds offerring no specific compliants at this time.    OBJECTIVE:  ICU Vital Signs Last 24 Hrs  T(C): 38.2 (2025 08:00), Max: 39.2 (2025 16:00)  T(F): 100.8 (2025 08:00), Max: 102.6 (2025 16:00)  HR: 87 (2025 12:00) (82 - 96)  BP: 127/72 (2025 12:00) (96/62 - 130/60)  BP(mean): 92 (2025 12:00) (71 - 92)  ABP: --  ABP(mean): --  RR: 33 (2025 12:00) (12 - 41)  SpO2: 95% (2025 12:00) (89% - 98%)    O2 Parameters below as of 2025 12:00  Patient On (Oxygen Delivery Method): nasal cannula w/ humidification  O2 Flow (L/min): 6          05 @ 07:  -  06 @ 07:00  --------------------------------------------------------  IN: 1480 mL / OUT: 1312 mL / NET: 168 mL     @ 07:01  -   @ 14:41  --------------------------------------------------------  IN: 240 mL / OUT: 315 mL / NET: -75 mL      CAPILLARY BLOOD GLUCOSE      POCT Blood Glucose.: 148 mg/dL (2025 11:37)      PHYSICAL EXAM:Daily     Daily Weight in k.6 (2025 06:00)  General: WN/WD NAD    HEENT:     + NCAT  + EOMI  - Conjuctival edema   - Icterus   - Thrush   - ETT  - NGT/OGT    Neck:         + FROM    - JVD     - Nodes     - Masses    + Mid-line trachea   - Tracheostomy    Chest:         - Sternal click  - Sternal drainage  + Pacing wires  - Chest tubes  - SubQ emphysema    Lungs:          + CTA   - Rhonchi    - Rales    - Wheezing     - Decreased BS   - Dullness R L    Cardiac:       + S1 + S2    + RRR   - Irregular   - S3  - S4    - Murmurs   - Rub   - Hamman’s sign     Abdomen:    + BS     + Soft    + Non-tender     - Distended    - Organomegaly  - PEG    Extremities:   - Cyanosis U/L   - Clubbing  U/L  - LE/UE Edema   + Capillary refill    + Pulses     Neuro:        + Awake   +  Alert   - Confused   - Lethargic   - Sedated   - Generalized Weakness    Skin:        - Rashes    - Erythema   + Normal incisions   + IV sites intact  - Sacral decubitus    HOSPITAL MEDICATIONS:  MEDICATIONS  (STANDING):  albumin human  5% IVPB 3000 milliLiter(s) IV Intermittent once  albuterol/ipratropium for Nebulization 3 milliLiter(s) Nebulizer every 6 hours  ascorbic acid 500 milliGRAM(s) Oral daily  aspirin enteric coated 325 milliGRAM(s) Oral daily  atorvastatin 40 milliGRAM(s) Oral at bedtime  bisacodyl 5 milliGRAM(s) Oral every 12 hours  chlorhexidine 2% Cloths 1 Application(s) Topical daily  dextrose 5%. 1000 milliLiter(s) (50 mL/Hr) IV Continuous <Continuous>  dextrose 5%. 1000 milliLiter(s) (100 mL/Hr) IV Continuous <Continuous>  dextrose 50% Injectable 50 milliLiter(s) IV Push every 15 minutes  enoxaparin Injectable 40 milliGRAM(s) SubCutaneous every 24 hours  famotidine    Tablet 20 milliGRAM(s) Oral two times a day  ferrous    sulfate 325 milliGRAM(s) Oral daily  folic acid 1 milliGRAM(s) Oral daily  furosemide   Injectable 20 milliGRAM(s) IV Push daily  gabapentin 300 milliGRAM(s) Oral every 8 hours  glucagon  Injectable 1 milliGRAM(s) IntraMuscular once  insulin lispro (ADMELOG) corrective regimen sliding scale   SubCutaneous three times a day before meals  insulin lispro (ADMELOG) corrective regimen sliding scale   SubCutaneous at bedtime  lidocaine   4% Patch 1 Patch Transdermal every 24 hours  metoprolol tartrate 12.5 milliGRAM(s) Oral every 12 hours  multivitamin/minerals 1 Tablet(s) Oral daily  polyethylene glycol 3350 17 Gram(s) Oral daily  potassium chloride    Tablet ER 20 milliEquivalent(s) Oral daily  senna 1 Tablet(s) Oral at bedtime    MEDICATIONS  (PRN):  dextrose Oral Gel 15 Gram(s) Oral once PRN Blood Glucose LESS THAN 70 milliGRAM(s)/deciliter  ibuprofen  Tablet. 400 milliGRAM(s) Oral every 8 hours PRN Temp greater or equal to 38C (100.4F)  melatonin 5 milliGRAM(s) Oral at bedtime PRN Insomnia  oxyCODONE    IR 5 milliGRAM(s) Oral every 4 hours PRN Moderate Pain (4 - 6)  oxyCODONE    IR 10 milliGRAM(s) Oral every 4 hours PRN Severe Pain (7 - 10)      LABS:                        9.0    10.35 )-----------( 159      ( 2025 01:54 )             25.6    04-06    134[L]  |  102  |  26[H]  ----------------------------<  144[H]  4.1   |  25  |  1.1    Ca    8.3[L]      2025 01:54  Mg     2.1     04-06    TPro  5.0[L]  /  Alb  3.3[L]  /  TBili  0.4  /  DBili  x   /  AST  54[H]  /  ALT  43[H]  /  AlkPhos  62  04-06     LIVER FUNCTIONS - ( 2025 01:54 )  Alb: 3.3 g/dL / Pro: 5.0 g/dL / ALK PHOS: 62 U/L / ALT: 43 U/L / AST: 54 U/L / GGT: x           Urinalysis Basic - ( 2025 01:54 )    Color: x / Appearance: x / SG: x / pH: x  Gluc: 144 mg/dL / Ketone: x  / Bili: x / Urobili: x   Blood: x / Protein: x / Nitrite: x   Leuk Esterase: x / RBC: x / WBC x   Sq Epi: x / Non Sq Epi: x / Bacteria: x        RADIOLOGY:  X Reviewed and interpreted by me: bilateral opacities/effusions    CARDIOPULMONARY DYSFUNCTION  - Respiratory status required supplemental oxygen & the following of continuous pulse oximetry for support & to prevent decompensation  - Continued early mobilization as tolerated  - Addressed analgesic regimen to optimize function    PREVENTION-PROPHYLAXIS  - ASA continued for graft occlusion-thromboembolism prophylaxis  - Lipitor was also started for long term graft patency  - Lovenox continued for VTE prophylaxis in addition to Venodyne boots  - Pepcid maintained for GI bleeding prophylaxis  - Lopressor continued for atrial fibrillation prophylaxis  - Metabolic stability & infection prophylaxis required review and adjustment of regular Insulin sliding scale and gylcemic regimen while following serial glucose levels to help achieve & maintain euglycemia  - Reviewed & addressed surgical site infection prophylaxis regimen

## 2025-04-06 NOTE — PHYSICAL THERAPY INITIAL EVALUATION ADULT - ASSISTIVE DEVICE FOR TRANSFER: GAIT, REHAB EVAL
initially with cardiac rolling walker X 300 feet ; then after 5 min seated rest break, ambulated another 200 feet using a regular rolling walker , with O2 follow at 4L/min nc/rolling walker

## 2025-04-06 NOTE — PHYSICAL THERAPY INITIAL EVALUATION ADULT - PERTINENT HX OF CURRENT PROBLEM, REHAB EVAL
Patient is 55 year-old male former smoker with a past medical history HTN, HLD, and family history of CAD presented to the hospital for an elective diagnostic cardiac catheterization. Patient was seen at the cardiologist's office complaining of intermittent bilateral jaw pain radiating down to the upper left chest. Patient underwent a CCTA showing a calcium score of 768 and multivessel disease. Patient was referred for a LHC. Denies chest palpitations, lightheadedness, dizziness, syncope, nausea, vomiting, and bilateral leg swelling. Today, patient underwent LHC revealing 3vCAD. CT Surgery was consulted for an evaluation of myocardial revascularization via bypass grafts.    Pt is now s/p CABG X 4, Pt. referred to PT for eval and tx.

## 2025-04-07 LAB
ALBUMIN SERPL ELPH-MCNC: 3 G/DL — LOW (ref 3.5–5.2)
ALP SERPL-CCNC: 63 U/L — SIGNIFICANT CHANGE UP (ref 30–115)
ALT FLD-CCNC: 39 U/L — SIGNIFICANT CHANGE UP (ref 0–41)
ANION GAP SERPL CALC-SCNC: 8 MMOL/L — SIGNIFICANT CHANGE UP (ref 7–14)
AST SERPL-CCNC: 40 U/L — SIGNIFICANT CHANGE UP (ref 0–41)
BASOPHILS # BLD AUTO: 0.02 K/UL — SIGNIFICANT CHANGE UP (ref 0–0.2)
BASOPHILS NFR BLD AUTO: 0.2 % — SIGNIFICANT CHANGE UP (ref 0–1)
BILIRUB SERPL-MCNC: 0.4 MG/DL — SIGNIFICANT CHANGE UP (ref 0.2–1.2)
BUN SERPL-MCNC: 24 MG/DL — HIGH (ref 10–20)
CALCIUM SERPL-MCNC: 7.7 MG/DL — LOW (ref 8.4–10.5)
CHLORIDE SERPL-SCNC: 107 MMOL/L — SIGNIFICANT CHANGE UP (ref 98–110)
CO2 SERPL-SCNC: 22 MMOL/L — SIGNIFICANT CHANGE UP (ref 17–32)
CREAT SERPL-MCNC: 0.9 MG/DL — SIGNIFICANT CHANGE UP (ref 0.7–1.5)
EGFR: 100 ML/MIN/1.73M2 — SIGNIFICANT CHANGE UP
EGFR: 100 ML/MIN/1.73M2 — SIGNIFICANT CHANGE UP
EOSINOPHIL # BLD AUTO: 0.02 K/UL — SIGNIFICANT CHANGE UP (ref 0–0.7)
EOSINOPHIL NFR BLD AUTO: 0.2 % — SIGNIFICANT CHANGE UP (ref 0–8)
GLUCOSE BLDC GLUCOMTR-MCNC: 121 MG/DL — HIGH (ref 70–99)
GLUCOSE BLDC GLUCOMTR-MCNC: 130 MG/DL — HIGH (ref 70–99)
GLUCOSE BLDC GLUCOMTR-MCNC: 134 MG/DL — HIGH (ref 70–99)
GLUCOSE BLDC GLUCOMTR-MCNC: 152 MG/DL — HIGH (ref 70–99)
GLUCOSE SERPL-MCNC: 115 MG/DL — HIGH (ref 70–99)
HCT VFR BLD CALC: 22.2 % — LOW (ref 42–52)
HGB BLD-MCNC: 7.7 G/DL — LOW (ref 14–18)
IMM GRANULOCYTES NFR BLD AUTO: 0.8 % — HIGH (ref 0.1–0.3)
LYMPHOCYTES # BLD AUTO: 1.3 K/UL — SIGNIFICANT CHANGE UP (ref 1.2–3.4)
LYMPHOCYTES # BLD AUTO: 14 % — LOW (ref 20.5–51.1)
MAGNESIUM SERPL-MCNC: 1.8 MG/DL — SIGNIFICANT CHANGE UP (ref 1.8–2.4)
MCHC RBC-ENTMCNC: 31.2 PG — HIGH (ref 27–31)
MCHC RBC-ENTMCNC: 34.7 G/DL — SIGNIFICANT CHANGE UP (ref 32–37)
MCV RBC AUTO: 89.9 FL — SIGNIFICANT CHANGE UP (ref 80–94)
MONOCYTES # BLD AUTO: 0.8 K/UL — HIGH (ref 0.1–0.6)
MONOCYTES NFR BLD AUTO: 8.6 % — SIGNIFICANT CHANGE UP (ref 1.7–9.3)
NEUTROPHILS # BLD AUTO: 7.05 K/UL — HIGH (ref 1.4–6.5)
NEUTROPHILS NFR BLD AUTO: 76.2 % — HIGH (ref 42.2–75.2)
NRBC BLD AUTO-RTO: 0 /100 WBCS — SIGNIFICANT CHANGE UP (ref 0–0)
PLATELET # BLD AUTO: 146 K/UL — SIGNIFICANT CHANGE UP (ref 130–400)
PMV BLD: 11.2 FL — HIGH (ref 7.4–10.4)
POTASSIUM SERPL-MCNC: 4.5 MMOL/L — SIGNIFICANT CHANGE UP (ref 3.5–5)
POTASSIUM SERPL-SCNC: 4.5 MMOL/L — SIGNIFICANT CHANGE UP (ref 3.5–5)
PROT SERPL-MCNC: 4.7 G/DL — LOW (ref 6–8)
RBC # BLD: 2.47 M/UL — LOW (ref 4.7–6.1)
RBC # FLD: 12.6 % — SIGNIFICANT CHANGE UP (ref 11.5–14.5)
SODIUM SERPL-SCNC: 137 MMOL/L — SIGNIFICANT CHANGE UP (ref 135–146)
WBC # BLD: 9.26 K/UL — SIGNIFICANT CHANGE UP (ref 4.8–10.8)
WBC # FLD AUTO: 9.26 K/UL — SIGNIFICANT CHANGE UP (ref 4.8–10.8)

## 2025-04-07 PROCEDURE — 71046 X-RAY EXAM CHEST 2 VIEWS: CPT | Mod: 26

## 2025-04-07 PROCEDURE — 71045 X-RAY EXAM CHEST 1 VIEW: CPT | Mod: 26

## 2025-04-07 PROCEDURE — 99291 CRITICAL CARE FIRST HOUR: CPT

## 2025-04-07 PROCEDURE — 71045 X-RAY EXAM CHEST 1 VIEW: CPT | Mod: 26,77

## 2025-04-07 PROCEDURE — 93010 ELECTROCARDIOGRAM REPORT: CPT

## 2025-04-07 RX ORDER — FUROSEMIDE 10 MG/ML
20 INJECTION INTRAMUSCULAR; INTRAVENOUS EVERY 12 HOURS
Refills: 0 | Status: DISCONTINUED | OUTPATIENT
Start: 2025-04-07 | End: 2025-04-11

## 2025-04-07 RX ORDER — MAGNESIUM SULFATE 500 MG/ML
2 SYRINGE (ML) INJECTION ONCE
Refills: 0 | Status: COMPLETED | OUTPATIENT
Start: 2025-04-07 | End: 2025-04-07

## 2025-04-07 RX ORDER — KETOROLAC TROMETHAMINE 30 MG/ML
15 INJECTION, SOLUTION INTRAMUSCULAR; INTRAVENOUS ONCE
Refills: 0 | Status: DISCONTINUED | OUTPATIENT
Start: 2025-04-07 | End: 2025-04-07

## 2025-04-07 RX ORDER — ACETAMINOPHEN 500 MG/5ML
1000 LIQUID (ML) ORAL ONCE
Refills: 0 | Status: COMPLETED | OUTPATIENT
Start: 2025-04-07 | End: 2025-04-07

## 2025-04-07 RX ADMIN — KETOROLAC TROMETHAMINE 15 MILLIGRAM(S): 30 INJECTION, SOLUTION INTRAMUSCULAR; INTRAVENOUS at 18:55

## 2025-04-07 RX ADMIN — Medication 25 GRAM(S): at 05:41

## 2025-04-07 RX ADMIN — FUROSEMIDE 20 MILLIGRAM(S): 10 INJECTION INTRAMUSCULAR; INTRAVENOUS at 05:42

## 2025-04-07 RX ADMIN — IPRATROPIUM BROMIDE AND ALBUTEROL SULFATE 3 MILLILITER(S): .5; 2.5 SOLUTION RESPIRATORY (INHALATION) at 14:54

## 2025-04-07 RX ADMIN — OXYCODONE HYDROCHLORIDE 5 MILLIGRAM(S): 30 TABLET ORAL at 21:20

## 2025-04-07 RX ADMIN — OXYCODONE HYDROCHLORIDE 5 MILLIGRAM(S): 30 TABLET ORAL at 12:45

## 2025-04-07 RX ADMIN — ENOXAPARIN SODIUM 40 MILLIGRAM(S): 100 INJECTION SUBCUTANEOUS at 15:01

## 2025-04-07 RX ADMIN — OXYCODONE HYDROCHLORIDE 10 MILLIGRAM(S): 30 TABLET ORAL at 18:12

## 2025-04-07 RX ADMIN — Medication 400 MILLIGRAM(S): at 05:46

## 2025-04-07 RX ADMIN — POLYETHYLENE GLYCOL 3350 17 GRAM(S): 17 POWDER, FOR SOLUTION ORAL at 14:21

## 2025-04-07 RX ADMIN — OXYCODONE HYDROCHLORIDE 5 MILLIGRAM(S): 30 TABLET ORAL at 21:17

## 2025-04-07 RX ADMIN — IPRATROPIUM BROMIDE AND ALBUTEROL SULFATE 3 MILLILITER(S): .5; 2.5 SOLUTION RESPIRATORY (INHALATION) at 04:18

## 2025-04-07 RX ADMIN — Medication 20 MILLIGRAM(S): at 17:42

## 2025-04-07 RX ADMIN — ATORVASTATIN CALCIUM 40 MILLIGRAM(S): 80 TABLET, FILM COATED ORAL at 21:03

## 2025-04-07 RX ADMIN — CLOPIDOGREL BISULFATE 75 MILLIGRAM(S): 75 TABLET, FILM COATED ORAL at 12:16

## 2025-04-07 RX ADMIN — GABAPENTIN 300 MILLIGRAM(S): 400 CAPSULE ORAL at 21:04

## 2025-04-07 RX ADMIN — GABAPENTIN 300 MILLIGRAM(S): 400 CAPSULE ORAL at 15:01

## 2025-04-07 RX ADMIN — Medication 325 MILLIGRAM(S): at 12:17

## 2025-04-07 RX ADMIN — Medication 325 MILLIGRAM(S): at 12:16

## 2025-04-07 RX ADMIN — GABAPENTIN 300 MILLIGRAM(S): 400 CAPSULE ORAL at 05:44

## 2025-04-07 RX ADMIN — KETOROLAC TROMETHAMINE 15 MILLIGRAM(S): 30 INJECTION, SOLUTION INTRAMUSCULAR; INTRAVENOUS at 19:10

## 2025-04-07 RX ADMIN — IPRATROPIUM BROMIDE AND ALBUTEROL SULFATE 3 MILLILITER(S): .5; 2.5 SOLUTION RESPIRATORY (INHALATION) at 19:45

## 2025-04-07 RX ADMIN — Medication 1000 MILLIGRAM(S): at 16:00

## 2025-04-07 RX ADMIN — IPRATROPIUM BROMIDE AND ALBUTEROL SULFATE 3 MILLILITER(S): .5; 2.5 SOLUTION RESPIRATORY (INHALATION) at 08:22

## 2025-04-07 RX ADMIN — Medication 400 MILLIGRAM(S): at 15:38

## 2025-04-07 RX ADMIN — Medication 500 MILLIGRAM(S): at 12:16

## 2025-04-07 RX ADMIN — Medication 400 MILLIGRAM(S): at 07:04

## 2025-04-07 RX ADMIN — FOLIC ACID 1 MILLIGRAM(S): 1 TABLET ORAL at 12:16

## 2025-04-07 RX ADMIN — LIDOCAINE HYDROCHLORIDE 1 PATCH: 20 JELLY TOPICAL at 07:04

## 2025-04-07 RX ADMIN — LIDOCAINE HYDROCHLORIDE 1 PATCH: 20 JELLY TOPICAL at 23:13

## 2025-04-07 RX ADMIN — Medication 400 MILLIGRAM(S): at 02:28

## 2025-04-07 RX ADMIN — Medication 20 MILLIEQUIVALENT(S): at 12:17

## 2025-04-07 RX ADMIN — Medication 1 APPLICATION(S): at 05:43

## 2025-04-07 RX ADMIN — Medication 1 TABLET(S): at 12:16

## 2025-04-07 RX ADMIN — METOPROLOL SUCCINATE 12.5 MILLIGRAM(S): 50 TABLET, EXTENDED RELEASE ORAL at 17:42

## 2025-04-07 RX ADMIN — Medication 5 MILLIGRAM(S): at 05:42

## 2025-04-07 RX ADMIN — OXYCODONE HYDROCHLORIDE 10 MILLIGRAM(S): 30 TABLET ORAL at 17:42

## 2025-04-07 RX ADMIN — METOPROLOL SUCCINATE 12.5 MILLIGRAM(S): 50 TABLET, EXTENDED RELEASE ORAL at 05:42

## 2025-04-07 RX ADMIN — OXYCODONE HYDROCHLORIDE 5 MILLIGRAM(S): 30 TABLET ORAL at 12:15

## 2025-04-07 RX ADMIN — FUROSEMIDE 20 MILLIGRAM(S): 10 INJECTION INTRAMUSCULAR; INTRAVENOUS at 17:41

## 2025-04-07 RX ADMIN — LIDOCAINE HYDROCHLORIDE 1 PATCH: 20 JELLY TOPICAL at 11:31

## 2025-04-07 RX ADMIN — Medication 20 MILLIGRAM(S): at 05:42

## 2025-04-07 RX ADMIN — Medication 1 TABLET(S): at 21:03

## 2025-04-07 NOTE — PROGRESS NOTE ADULT - SUBJECTIVE AND OBJECTIVE BOX
CTU Attending Progress Daily Note     04 Apr 2025 17:12  Procedure: OPCABx4  POD#: 3    He has history of HTN (hypertension)    Anxiety, Stress, and Tension    Depression    Dyslipidemia    [place] (hard of hearing)    Former smoker      HPI: Patient is 55 year-old male former smoker with a past medical history HTN, HLD, and family history of CAD presented to the hospital for an elective diagnostic cardiac catheterization. Patient was seen at the cardiologist's office complaining of intermittent bilateral jaw pain radiating down to the upper left chest. Patient underwent a CCTA showing a calcium score of 768 and multivessel disease. Patient was referred for a LHC. Denies chest palpitations, lightheadedness, dizziness, syncope, nausea, vomiting, and bilateral leg swelling. Today, patient underwent LHC revealing 3vCAD. CT Surgery was consulted for an evaluation of myocardial revascularization via bypass grafts.     OR Data  Procedure: off pump CABGx4  Bypass: N/A  Cross Clamp: N/A   Pre LV Fxn: 50-55%      RV Fxn: nml  Post LV Fxn: 50-55%     RV Fxn: nml  Blood Products: None  Cell Saver: 228 cc  Fluids: 2.5L crystalloid/500 cc Albumin  Pacing Wires: A/V    Hospital Course:  4/4: Arrived intubated on Levophed. Extubated in short course and weaned off pressors  4/5 CT and fem line removed  4/6 started BB  4/7 R pleural effusion drained    OBJECTIVE:  ICU Vital Signs Last 24 Hrs  T(C): 36.7 (07 Apr 2025 12:00), Max: 38.7 (06 Apr 2025 20:00)  T(F): 98.1 (07 Apr 2025 12:00), Max: 101.6 (06 Apr 2025 20:00)  HR: 101 (07 Apr 2025 15:00) (71 - 102)  BP: 131/66 (07 Apr 2025 15:00) (89/55 - 145/77)  BP(mean): 82 (07 Apr 2025 14:00) (66 - 102)  ABP: --  ABP(mean): --  RR: 25 (07 Apr 2025 15:00) (17 - 30)  SpO2: 94% (07 Apr 2025 15:00) (91% - 97%)    O2 Parameters below as of 07 Apr 2025 15:00  Patient On (Oxygen Delivery Method): nasal cannula  O2 Flow (L/min): 3        I&O's Summary    06 Apr 2025 07:01  -  07 Apr 2025 07:00  --------------------------------------------------------  IN: 770 mL / OUT: 1565 mL / NET: -795 mL    07 Apr 2025 07:01  -  07 Apr 2025 16:58  --------------------------------------------------------  IN: 240 mL / OUT: 350 mL / NET: -110 mL      I&O's Detail    06 Apr 2025 07:01  -  07 Apr 2025 07:00  --------------------------------------------------------  IN:    IV PiggyBack: 50 mL    Oral Fluid: 720 mL  Total IN: 770 mL    OUT:    Indwelling Catheter - Urethral (mL): 115 mL    Voided (mL): 1450 mL  Total OUT: 1565 mL    Total NET: -795 mL      07 Apr 2025 07:01  -  07 Apr 2025 16:58  --------------------------------------------------------  IN:    Oral Fluid: 240 mL  Total IN: 240 mL    OUT:    Voided (mL): 350 mL  Total OUT: 350 mL    Total NET: -110 mL        Adult Advanced Hemodynamics Last 24 Hrs  CVP(mm Hg): --  CVP(cm H2O): --  CO: --  CI: --  PA: --  PA(mean): --  PCWP: --  SVR: --  SVRI: --  PVR: --  PVRI: --    CAPILLARY BLOOD GLUCOSE      POCT Blood Glucose.: 130 mg/dL (07 Apr 2025 16:35)  POCT Blood Glucose.: 121 mg/dL (07 Apr 2025 11:25)  POCT Blood Glucose.: 134 mg/dL (07 Apr 2025 06:55)  POCT Blood Glucose.: 161 mg/dL (06 Apr 2025 21:49)    LABS:                          7.7    9.26  )-----------( 146      ( 07 Apr 2025 01:10 )             22.2     04-07    137  |  107  |  24[H]  ----------------------------<  115[H]  4.5   |  22  |  0.9    Ca    7.7[L]      07 Apr 2025 01:10  Mg     1.8     04-07    TPro  4.7[L]  /  Alb  3.0[L]  /  TBili  0.4  /  DBili  x   /  AST  40  /  ALT  39  /  AlkPhos  63  04-07      Urinalysis Basic - ( 07 Apr 2025 01:10 )    Color: x / Appearance: x / SG: x / pH: x  Gluc: 115 mg/dL / Ketone: x  / Bili: x / Urobili: x   Blood: x / Protein: x / Nitrite: x   Leuk Esterase: x / RBC: x / WBC x   Sq Epi: x / Non Sq Epi: x / Bacteria: x        Home Medications:  lisinopril 40 mg oral tablet: 1 tab(s) orally once a day PATIENT NON COMPLIANT WITH MEDICATION (02 Apr 2025 09:30)    HOSPITAL MEDICATIONS:  MEDICATIONS  (STANDING):  acetaminophen   IVPB .. 1000 milliGRAM(s) IV Intermittent once  albumin human  5% IVPB 3000 milliLiter(s) IV Intermittent once  albuterol/ipratropium for Nebulization 3 milliLiter(s) Nebulizer every 6 hours  ascorbic acid 500 milliGRAM(s) Oral daily  aspirin enteric coated 325 milliGRAM(s) Oral daily  atorvastatin 40 milliGRAM(s) Oral at bedtime  bisacodyl 5 milliGRAM(s) Oral every 12 hours  chlorhexidine 2% Cloths 1 Application(s) Topical daily  clopidogrel Tablet 75 milliGRAM(s) Oral daily  dextrose 5%. 1000 milliLiter(s) (50 mL/Hr) IV Continuous <Continuous>  dextrose 5%. 1000 milliLiter(s) (100 mL/Hr) IV Continuous <Continuous>  dextrose 50% Injectable 50 milliLiter(s) IV Push every 15 minutes  enoxaparin Injectable 40 milliGRAM(s) SubCutaneous every 24 hours  famotidine    Tablet 20 milliGRAM(s) Oral two times a day  ferrous    sulfate 325 milliGRAM(s) Oral daily  folic acid 1 milliGRAM(s) Oral daily  furosemide   Injectable 20 milliGRAM(s) IV Push every 12 hours  gabapentin 300 milliGRAM(s) Oral every 8 hours  glucagon  Injectable 1 milliGRAM(s) IntraMuscular once  insulin lispro (ADMELOG) corrective regimen sliding scale   SubCutaneous three times a day before meals  insulin lispro (ADMELOG) corrective regimen sliding scale   SubCutaneous at bedtime  lidocaine   4% Patch 1 Patch Transdermal every 24 hours  metoprolol tartrate 12.5 milliGRAM(s) Oral every 12 hours  multivitamin/minerals 1 Tablet(s) Oral daily  polyethylene glycol 3350 17 Gram(s) Oral daily  potassium chloride    Tablet ER 20 milliEquivalent(s) Oral daily  senna 1 Tablet(s) Oral at bedtime    MEDICATIONS  (PRN):  dextrose Oral Gel 15 Gram(s) Oral once PRN Blood Glucose LESS THAN 70 milliGRAM(s)/deciliter  ibuprofen  Tablet. 400 milliGRAM(s) Oral every 8 hours PRN Temp greater or equal to 38C (100.4F)  melatonin 5 milliGRAM(s) Oral at bedtime PRN Insomnia  oxyCODONE    IR 5 milliGRAM(s) Oral every 4 hours PRN Moderate Pain (4 - 6)  oxyCODONE    IR 10 milliGRAM(s) Oral every 4 hours PRN Severe Pain (7 - 10)    RADIOLOGY:  Chest X-ray Reviewed    REVIEW OF SYSTEMS:  CONSTITUTIONAL: [X] all negative; [ ] weakness, [ ] fevers, [ ] chills  EYES/ENT: [X] all negative; [ ] visual changes, [ ] vertigo, [ ] throat pain   NECK: [X] all negative; [ ] pain, [ ] stiffness  RESPIRATORY: [] all negative, [ ] cough, [ ] wheezing, [ ] hemoptysis, [ ] shortness of breath  CARDIOVASCULAR: [] all negative; [ ] chest pain, [ ] palpitations, [ ] orthopnea  GASTROINTESTINAL: [X] all negative; [ ]abdominal pain, [ ] nausea, [ ] vomiting, [ ] hematemesis, [ ] diarrhea, [ ] constipation, [ ] melena, [ ] hematochezia.  GENITOURINARY: [X] all negative; [ ] dysuria, [ ] frequency, [ ] hematuria  NEUROLOGICAL: [X] all negative; [ ] numbness, [ ] weakness  SKIN: [X] all negative; [ ] itching, [ ] burning, [ ] rashes, [ ] lesions   All other review of systems is negative unless indicated above.  [  ] Unable to assess ROS because     PHYSICAL EXAM:          CONSTITUTIONAL: No acute distress  	SKIN: warm, dry  	EYES: PERRL, EOM, no conj injection, sclera clear  	NECK: JVP mid neck   	CARD: Regular rate and rhythm, no murmurs, rubs or gallops  	RESP: CTA B/L; no wheezes, rales or rhonchi.  	ABD: Soft, not tender, not distended, bowel sounds present  	EXT: 2+ pulses on all extremities, no clubbing, cyanosis or edema  	NEURO: A+Ox3, strength intact throughout    Assessment   s/p OPCABx4 POD 4 currently progressing well.     Plan:    Neuro:  Multimodal pain management  Tylenol, Lidoderm patch, gabapentin, opiates as needed  Monitor neuro status in the post op period    CV:  S/P OPCAB  Monitor perfusion, , lactate, UOP    ASA, statin  On beta blockers  Lasix daily  Plavix    Resp:  Continuous pulse oximetry monitoring  IS / Chest PT  OOBTC and Ambulate once extubated  Nebulizers as needed  R pleural effusion - drain today    GI:  Heart healthy diet  Bowel Regimen - escalate as needed  PUD ppx per protocol    Renal  High risk for BRENT post surgery  Monitor UOP, lytes, creatinine  Diuretics as needed for negative fluid balance  Keep K > 4, Mg > 2    Endo:  Tight glucose control per CTICU protocl    Heme:  Acute blood loss anemia post surgery  High risk for thrombocytopenia  DVT ppx with lovenox  IV iron / FA / MVI    ID:  Perioperative prophylactic abx per protocol  Monitor fever curve and WBC count  Remove TLC today    Upon my evaluation, the above patient has a high probability of imminent or life threatening deterioration due to a high risk for shock, cardiogenic shock, arrhythmias, acute blood loss, acute kidney injury and acute pulmonary insufficiency which required my direct attention, intervention, and personal management.  Total time spent includes review of radiology results, ordering, interpreting and reviewing diagnostic studies / lab tests with immediate assessment and treatment, consultant collaboration on findings and treatment options, monitoring for potential decompensation, obtaining history from family, EMT, nursing home staff and/or treating physicians; directing the titration of pressors, oxygen support devices, fluid resuscitation, interpretation of cardiac output measurements, interpretation of radiological assessments, interpretation of EKG / rhythm strips, telemetry.  This time did not overlap with the management of other patients or performing separately reportable procedures.      Management of this patient was discussed with the CT surgery attending and mid-level providers.    I acknowledge the use of copied documentation.  All copy forward documentation is my own and has been reviewed and revised as appropriate.  If no changes were made, it is because that information remains the same.

## 2025-04-07 NOTE — PROGRESS NOTE ADULT - SUBJECTIVE AND OBJECTIVE BOX
OPERATIVE PROCEDURE(s):  CABGx4              POD #    3                   55yMale  SURGEON(s): MICHELLE Young  SUBJECTIVE ASSESSMENT:   Vital Signs Last 24 Hrs  T(F): 99.8 (07 Apr 2025 06:00), Max: 101.6 (06 Apr 2025 20:00)  HR: 73 (07 Apr 2025 08:00) (71 - 102)  BP: 99/55 (07 Apr 2025 08:00) (89/55 - 130/60)  BP(mean): 72 (07 Apr 2025 08:00) (66 - 92)  RR: 23 (07 Apr 2025 08:00) (15 - 41)  SpO2: 95% (07 Apr 2025 08:00) (91% - 98%)      I&O's Detail    06 Apr 2025 07:01  -  07 Apr 2025 07:00  --------------------------------------------------------  IN:    IV PiggyBack: 50 mL    Oral Fluid: 720 mL  Total IN: 770 mL    OUT:    Indwelling Catheter - Urethral (mL): 115 mL    Voided (mL): 1450 mL  Total OUT: 1565 mL        Net:   I&O's Detail    05 Apr 2025 07:01  -  06 Apr 2025 07:00  --------------------------------------------------------  Total NET: 168 mL      06 Apr 2025 07:01  -  07 Apr 2025 07:00  --------------------------------------------------------  Total NET: -795 mL        CAPILLARY BLOOD GLUCOSE      POCT Blood Glucose.: 134 mg/dL (07 Apr 2025 06:55)  POCT Blood Glucose.: 161 mg/dL (06 Apr 2025 21:49)  POCT Blood Glucose.: 128 mg/dL (06 Apr 2025 16:42)  POCT Blood Glucose.: 148 mg/dL (06 Apr 2025 11:37)    Physical Exam:  General: NAD; A&Ox3/Patient is intubated and sedated  Cardiac: S1/S2, RRR, no murmur, no rubs  Lungs: unlabored respirations, CTA b/l, no wheeze, no rales, no crackles  Abdomen: Soft/NT/ND; positive bowel sounds x 4  Sternum: Intact, no click, incision healing well with no drainage  Incisions: Incisions clean/dry/intact  Extremities: No edema b/l lower extremities; good capillary refill; no cyanosis; palpable 1+ pedal pulses b/l    Central Venous Catheter: Yes[]  No[] , If Yes indication:           Day #  Rivero Catheter: Yes  [] , No  [] , If yes indication:                      Day #  NGT: Yes [] No [] ,    If Yes Placement:                                     Day #  EPICARDIAL WIRES:  [] YES [] NO                                              Day #  BOWEL MOVEMENT:  [] YES [] NO, If No, Timing since last BM:      Day #  CHEST TUBE(Left/Right):  [] YES [] NO, If yes -  AIR LEAKS:  [] YES [] NO        LABS:                        7.7[L]  9.26  )-----------( 146      ( 07 Apr 2025 01:10 )             22.2[L]                        9.0[L]  10.35 )-----------( 159      ( 06 Apr 2025 01:54 )             25.6[L]    04-07    137  |  107  |  24[H]  ----------------------------<  115[H]  4.5   |  22  |  0.9  04-06    134[L]  |  102  |  26[H]  ----------------------------<  144[H]  4.1   |  25  |  1.1    Ca    7.7[L]      07 Apr 2025 01:10  Mg     1.8     04-07    TPro  4.7[L] [6.0 - 8.0]  /  Alb  3.0[L] [3.5 - 5.2]  /  TBili  0.4 [0.2 - 1.2]  /  DBili  x   /  AST  40 [0 - 41]  /  ALT  39 [0 - 41]  /  AlkPhos  63 [30 - 115]  04-07      Urinalysis Basic - ( 07 Apr 2025 01:10 )    Color: x / Appearance: x / SG: x / pH: x  Gluc: 115 mg/dL / Ketone: x  / Bili: x / Urobili: x   Blood: x / Protein: x / Nitrite: x   Leuk Esterase: x / RBC: x / WBC x   Sq Epi: x / Non Sq Epi: x / Bacteria: x        RADIOLOGY & ADDITIONAL TESTS:  CXR:  EKG:  MEDICATIONS  (STANDING):  albumin human  5% IVPB 3000 milliLiter(s) IV Intermittent once  albuterol/ipratropium for Nebulization 3 milliLiter(s) Nebulizer every 6 hours  ascorbic acid 500 milliGRAM(s) Oral daily  aspirin enteric coated 325 milliGRAM(s) Oral daily  atorvastatin 40 milliGRAM(s) Oral at bedtime  bisacodyl 5 milliGRAM(s) Oral every 12 hours  chlorhexidine 2% Cloths 1 Application(s) Topical daily  clopidogrel Tablet 75 milliGRAM(s) Oral daily  dextrose 5%. 1000 milliLiter(s) (50 mL/Hr) IV Continuous <Continuous>  dextrose 5%. 1000 milliLiter(s) (100 mL/Hr) IV Continuous <Continuous>  dextrose 50% Injectable 50 milliLiter(s) IV Push every 15 minutes  enoxaparin Injectable 40 milliGRAM(s) SubCutaneous every 24 hours  famotidine    Tablet 20 milliGRAM(s) Oral two times a day  ferrous    sulfate 325 milliGRAM(s) Oral daily  folic acid 1 milliGRAM(s) Oral daily  furosemide   Injectable 20 milliGRAM(s) IV Push daily  gabapentin 300 milliGRAM(s) Oral every 8 hours  glucagon  Injectable 1 milliGRAM(s) IntraMuscular once  insulin lispro (ADMELOG) corrective regimen sliding scale   SubCutaneous three times a day before meals  insulin lispro (ADMELOG) corrective regimen sliding scale   SubCutaneous at bedtime  lidocaine   4% Patch 1 Patch Transdermal every 24 hours  metoprolol tartrate 12.5 milliGRAM(s) Oral every 12 hours  multivitamin/minerals 1 Tablet(s) Oral daily  polyethylene glycol 3350 17 Gram(s) Oral daily  potassium chloride    Tablet ER 20 milliEquivalent(s) Oral daily  senna 1 Tablet(s) Oral at bedtime    MEDICATIONS  (PRN):  dextrose Oral Gel 15 Gram(s) Oral once PRN Blood Glucose LESS THAN 70 milliGRAM(s)/deciliter  ibuprofen  Tablet. 400 milliGRAM(s) Oral every 8 hours PRN Temp greater or equal to 38C (100.4F)  melatonin 5 milliGRAM(s) Oral at bedtime PRN Insomnia  oxyCODONE    IR 5 milliGRAM(s) Oral every 4 hours PRN Moderate Pain (4 - 6)  oxyCODONE    IR 10 milliGRAM(s) Oral every 4 hours PRN Severe Pain (7 - 10)    HEPARIN:  [] YES [] NO  Dose: XX UNITS/HR UNITS Q8H  LOVENOX:[] YES [] NO  Dose: XX mg Q24H  COUMADIN: []  YES [] NO  Dose: XX mg  Q24H  SCD's: YES b/l  GI Prophylaxis: Protonix [], Pepcid [], None [], (Contra-indication:.....)    Post-Op Beta-Blockers: Yes [], No[], If No, then contraindication:  Post-Op Aspirin: Yes [],  No [], If No, then contraindication:  Post-Op Statin: Yes [], No[], If No, then contraindication:  Allergies    No Known Allergies    Intolerances      Ambulation/Activity Status:    Assessment/Plan:  55y Male status-post .....  - Case and plan discussed with CTU Intensivist and CT Surgeon - Dr. Young/Hank/Efe  - Continue CTU supportive care    - Continue DVT/GI prophylaxis  - Incentive Spirometry 10 times an hour  - Continue to advance physical activity as tolerated and continue PT/OT as directed  1. CAD: Continue ASA, statin, BB  2. HTN:   3. A. Fib:   4. COPD/Hypoxia:   5. DM/Glucose Control:     Social Service Disposition:     OPERATIVE PROCEDURE(s):  CABGx4              POD #    3                   55yMale  SURGEON(s): Hank  SUBJECTIVE ASSESSMENT: pt seen and examined. no acute complaints at this time.   Vital Signs Last 24 Hrs  T(F): 99.8 (07 Apr 2025 06:00), Max: 101.6 (06 Apr 2025 20:00)  HR: 73 (07 Apr 2025 08:00) (71 - 102)  BP: 99/55 (07 Apr 2025 08:00) (89/55 - 130/60)  BP(mean): 72 (07 Apr 2025 08:00) (66 - 92)  RR: 23 (07 Apr 2025 08:00) (15 - 41)  SpO2: 95% (07 Apr 2025 08:00) (91% - 98%)      I&O's Detail    06 Apr 2025 07:01  -  07 Apr 2025 07:00  --------------------------------------------------------  IN:    IV PiggyBack: 50 mL    Oral Fluid: 720 mL  Total IN: 770 mL    OUT:    Indwelling Catheter - Urethral (mL): 115 mL    Voided (mL): 1450 mL  Total OUT: 1565 mL        Net:   I&O's Detail    05 Apr 2025 07:01  -  06 Apr 2025 07:00  --------------------------------------------------------  Total NET: 168 mL      06 Apr 2025 07:01  -  07 Apr 2025 07:00  --------------------------------------------------------  Total NET: -795 mL        CAPILLARY BLOOD GLUCOSE      POCT Blood Glucose.: 134 mg/dL (07 Apr 2025 06:55)  POCT Blood Glucose.: 161 mg/dL (06 Apr 2025 21:49)  POCT Blood Glucose.: 128 mg/dL (06 Apr 2025 16:42)  POCT Blood Glucose.: 148 mg/dL (06 Apr 2025 11:37)    Physical Exam:  General: NAD; A&Ox3, no focal deficits, clear speech   Cardiac: S1/S2, RRR, no murmur, no rubs  Lungs: unlabored respirations, CTA b/l, no wheeze, no rales, no crackles  Abdomen: Soft/NT/ND; positive bowel sounds x 4  Sternum: Intact, no click, incision healing well with no drainage  Incisions: Incisions clean/dry/intact  Extremities: mild edema b/l lower extremities; good capillary refill; no cyanosis; palpable 1+ pedal pulses b/l    Central Venous Catheter: Yes[x]  No[] , If Yes indication:    critical     Day #3  EPICARDIAL WIRES:  [x] YES [] NO                                              Day #3  BOWEL MOVEMENT:  [] YES [x] NO, If No, Timing since last BM:      Day #        LABS:                        7.7[L]  9.26  )-----------( 146      ( 07 Apr 2025 01:10 )             22.2[L]                        9.0[L]  10.35 )-----------( 159      ( 06 Apr 2025 01:54 )             25.6[L]    04-07    137  |  107  |  24[H]  ----------------------------<  115[H]  4.5   |  22  |  0.9  04-06    134[L]  |  102  |  26[H]  ----------------------------<  144[H]  4.1   |  25  |  1.1    Ca    7.7[L]      07 Apr 2025 01:10  Mg     1.8     04-07    TPro  4.7[L] [6.0 - 8.0]  /  Alb  3.0[L] [3.5 - 5.2]  /  TBili  0.4 [0.2 - 1.2]  /  DBili  x   /  AST  40 [0 - 41]  /  ALT  39 [0 - 41]  /  AlkPhos  63 [30 - 115]  04-07      Urinalysis Basic - ( 07 Apr 2025 01:10 )    Color: x / Appearance: x / SG: x / pH: x  Gluc: 115 mg/dL / Ketone: x  / Bili: x / Urobili: x   Blood: x / Protein: x / Nitrite: x   Leuk Esterase: x / RBC: x / WBC x   Sq Epi: x / Non Sq Epi: x / Bacteria: x        RADIOLOGY & ADDITIONAL TESTS:  CXR: < from: Xray Chest 2 Views PA/Lat (04.07.25 @ 08:55) >  IMPRESSION:    Large right pleural effusion extending up to the superior right lung.   Trace left pleural effusion.    < end of copied text >    EKG: < from: 12 Lead ECG (04.07.25 @ 07:22) >    Ventricular Rate 73 BPM    Atrial Rate 73 BPM    P-R Interval 138 ms    QRS Duration 90 ms    Q-T Interval 374 ms    QTC Calculation(Bazett) 412 ms    P Axis 21 degrees    R Axis 17 degrees    T Axis 3 degrees    Diagnosis Line Normal sinus rhythm  Normal ECG    < end of copied text >    MEDICATIONS  (STANDING):  albumin human  5% IVPB 3000 milliLiter(s) IV Intermittent once  albuterol/ipratropium for Nebulization 3 milliLiter(s) Nebulizer every 6 hours  ascorbic acid 500 milliGRAM(s) Oral daily  aspirin enteric coated 325 milliGRAM(s) Oral daily  atorvastatin 40 milliGRAM(s) Oral at bedtime  bisacodyl 5 milliGRAM(s) Oral every 12 hours  chlorhexidine 2% Cloths 1 Application(s) Topical daily  clopidogrel Tablet 75 milliGRAM(s) Oral daily  dextrose 5%. 1000 milliLiter(s) (50 mL/Hr) IV Continuous <Continuous>  dextrose 5%. 1000 milliLiter(s) (100 mL/Hr) IV Continuous <Continuous>  dextrose 50% Injectable 50 milliLiter(s) IV Push every 15 minutes  enoxaparin Injectable 40 milliGRAM(s) SubCutaneous every 24 hours  famotidine    Tablet 20 milliGRAM(s) Oral two times a day  ferrous    sulfate 325 milliGRAM(s) Oral daily  folic acid 1 milliGRAM(s) Oral daily  furosemide   Injectable 20 milliGRAM(s) IV Push daily  gabapentin 300 milliGRAM(s) Oral every 8 hours  glucagon  Injectable 1 milliGRAM(s) IntraMuscular once  insulin lispro (ADMELOG) corrective regimen sliding scale   SubCutaneous three times a day before meals  insulin lispro (ADMELOG) corrective regimen sliding scale   SubCutaneous at bedtime  lidocaine   4% Patch 1 Patch Transdermal every 24 hours  metoprolol tartrate 12.5 milliGRAM(s) Oral every 12 hours  multivitamin/minerals 1 Tablet(s) Oral daily  polyethylene glycol 3350 17 Gram(s) Oral daily  potassium chloride    Tablet ER 20 milliEquivalent(s) Oral daily  senna 1 Tablet(s) Oral at bedtime    MEDICATIONS  (PRN):  dextrose Oral Gel 15 Gram(s) Oral once PRN Blood Glucose LESS THAN 70 milliGRAM(s)/deciliter  ibuprofen  Tablet. 400 milliGRAM(s) Oral every 8 hours PRN Temp greater or equal to 38C (100.4F)  melatonin 5 milliGRAM(s) Oral at bedtime PRN Insomnia  oxyCODONE    IR 5 milliGRAM(s) Oral every 4 hours PRN Moderate Pain (4 - 6)  oxyCODONE    IR 10 milliGRAM(s) Oral every 4 hours PRN Severe Pain (7 - 10)      LOVENOX:[x] YES [] NO  Dose: XX mg Q24H  SCD's: YES b/l  GI Prophylaxis: Protonix [], Pepcid [x], None [], (Contra-indication:.....)    Post-Op Beta-Blockers: Yes [x], No[], If No, then contraindication:  Post-Op Aspirin: Yes [x],  No [], If No, then contraindication:  Post-Op Statin: Yes [x], No[], If No, then contraindication:  Allergies    No Known Allergies    Intolerances      Ambulation/Activity Status: ambulate     Assessment/Plan:  55y Male status-post CABGx4 POD#3  - Case and plan discussed with CTU Intensivist and CT Surgeon - Dr. Young/Hank/Efe  - Continue CTU supportive care    - Continue DVT/GI prophylaxis  - Incentive Spirometry 10 times an hour  - Continue to advance physical activity as tolerated and continue PT/OT as directed  1. CAD: Continue ASA, statin, BB, plavix, pain control prn, d/c pacing wires   2. HTN: cont to monitor, cont bb   3. A. Fib prophylaxis: cont to monitor electrolytes, cont bb   4. Post-op Hypoxia secondary to acute pulmonary insufficiency: cont nebs, wean o2 as tolerated, encourage incentive spirometry, lasix for noncardiogenic fluid overload    5. DM/Glucose Control: cont insulin sliding scale     Social Service Disposition:  home

## 2025-04-08 LAB
ALBUMIN SERPL ELPH-MCNC: 3.3 G/DL — LOW (ref 3.5–5.2)
ALP SERPL-CCNC: 97 U/L — SIGNIFICANT CHANGE UP (ref 30–115)
ALT FLD-CCNC: 50 U/L — HIGH (ref 0–41)
ANION GAP SERPL CALC-SCNC: 11 MMOL/L — SIGNIFICANT CHANGE UP (ref 7–14)
APPEARANCE UR: CLEAR — SIGNIFICANT CHANGE UP
AST SERPL-CCNC: 47 U/L — HIGH (ref 0–41)
BACTERIA # UR AUTO: NEGATIVE /HPF — SIGNIFICANT CHANGE UP
BASOPHILS # BLD AUTO: 0.03 K/UL — SIGNIFICANT CHANGE UP (ref 0–0.2)
BASOPHILS NFR BLD AUTO: 0.4 % — SIGNIFICANT CHANGE UP (ref 0–1)
BILIRUB SERPL-MCNC: 0.4 MG/DL — SIGNIFICANT CHANGE UP (ref 0.2–1.2)
BILIRUB UR-MCNC: NEGATIVE — SIGNIFICANT CHANGE UP
BUN SERPL-MCNC: 26 MG/DL — HIGH (ref 10–20)
CALCIUM SERPL-MCNC: 8.2 MG/DL — LOW (ref 8.4–10.5)
CAST: 2 /LPF — SIGNIFICANT CHANGE UP (ref 0–4)
CHLORIDE SERPL-SCNC: 102 MMOL/L — SIGNIFICANT CHANGE UP (ref 98–110)
CO2 SERPL-SCNC: 24 MMOL/L — SIGNIFICANT CHANGE UP (ref 17–32)
COLOR SPEC: YELLOW — SIGNIFICANT CHANGE UP
CREAT SERPL-MCNC: 1.1 MG/DL — SIGNIFICANT CHANGE UP (ref 0.7–1.5)
DIFF PNL FLD: NEGATIVE — SIGNIFICANT CHANGE UP
EGFR: 78 ML/MIN/1.73M2 — SIGNIFICANT CHANGE UP
EGFR: 78 ML/MIN/1.73M2 — SIGNIFICANT CHANGE UP
EOSINOPHIL # BLD AUTO: 0.03 K/UL — SIGNIFICANT CHANGE UP (ref 0–0.7)
EOSINOPHIL NFR BLD AUTO: 0.4 % — SIGNIFICANT CHANGE UP (ref 0–8)
GLUCOSE BLDC GLUCOMTR-MCNC: 126 MG/DL — HIGH (ref 70–99)
GLUCOSE BLDC GLUCOMTR-MCNC: 128 MG/DL — HIGH (ref 70–99)
GLUCOSE BLDC GLUCOMTR-MCNC: 144 MG/DL — HIGH (ref 70–99)
GLUCOSE BLDC GLUCOMTR-MCNC: 146 MG/DL — HIGH (ref 70–99)
GLUCOSE SERPL-MCNC: 127 MG/DL — HIGH (ref 70–99)
GLUCOSE UR QL: NEGATIVE MG/DL — SIGNIFICANT CHANGE UP
HCT VFR BLD CALC: 24.7 % — LOW (ref 42–52)
HGB BLD-MCNC: 8.5 G/DL — LOW (ref 14–18)
IMM GRANULOCYTES NFR BLD AUTO: 0.9 % — HIGH (ref 0.1–0.3)
KETONES UR-MCNC: NEGATIVE MG/DL — SIGNIFICANT CHANGE UP
LEUKOCYTE ESTERASE UR-ACNC: NEGATIVE — SIGNIFICANT CHANGE UP
LYMPHOCYTES # BLD AUTO: 1.12 K/UL — LOW (ref 1.2–3.4)
LYMPHOCYTES # BLD AUTO: 14 % — LOW (ref 20.5–51.1)
MAGNESIUM SERPL-MCNC: 2.2 MG/DL — SIGNIFICANT CHANGE UP (ref 1.8–2.4)
MCHC RBC-ENTMCNC: 30.9 PG — SIGNIFICANT CHANGE UP (ref 27–31)
MCHC RBC-ENTMCNC: 34.4 G/DL — SIGNIFICANT CHANGE UP (ref 32–37)
MCV RBC AUTO: 89.8 FL — SIGNIFICANT CHANGE UP (ref 80–94)
MONOCYTES # BLD AUTO: 0.75 K/UL — HIGH (ref 0.1–0.6)
MONOCYTES NFR BLD AUTO: 9.4 % — HIGH (ref 1.7–9.3)
NEUTROPHILS # BLD AUTO: 6.01 K/UL — SIGNIFICANT CHANGE UP (ref 1.4–6.5)
NEUTROPHILS NFR BLD AUTO: 74.9 % — SIGNIFICANT CHANGE UP (ref 42.2–75.2)
NITRITE UR-MCNC: NEGATIVE — SIGNIFICANT CHANGE UP
NRBC BLD AUTO-RTO: 0 /100 WBCS — SIGNIFICANT CHANGE UP (ref 0–0)
PH UR: 6 — SIGNIFICANT CHANGE UP (ref 5–8)
PLATELET # BLD AUTO: 198 K/UL — SIGNIFICANT CHANGE UP (ref 130–400)
PMV BLD: 10.9 FL — HIGH (ref 7.4–10.4)
POTASSIUM SERPL-MCNC: 4.3 MMOL/L — SIGNIFICANT CHANGE UP (ref 3.5–5)
POTASSIUM SERPL-SCNC: 4.3 MMOL/L — SIGNIFICANT CHANGE UP (ref 3.5–5)
PROT SERPL-MCNC: 5.6 G/DL — LOW (ref 6–8)
PROT UR-MCNC: 30 MG/DL
RBC # BLD: 2.75 M/UL — LOW (ref 4.7–6.1)
RBC # FLD: 13 % — SIGNIFICANT CHANGE UP (ref 11.5–14.5)
RBC CASTS # UR COMP ASSIST: 1 /HPF — SIGNIFICANT CHANGE UP (ref 0–4)
SODIUM SERPL-SCNC: 137 MMOL/L — SIGNIFICANT CHANGE UP (ref 135–146)
SP GR SPEC: 1.02 — SIGNIFICANT CHANGE UP (ref 1–1.03)
SQUAMOUS # UR AUTO: 0 /HPF — SIGNIFICANT CHANGE UP (ref 0–5)
URATE CRY FLD QL MICRO: PRESENT
UROBILINOGEN FLD QL: 0.2 MG/DL — SIGNIFICANT CHANGE UP (ref 0.2–1)
WBC # BLD: 8.01 K/UL — SIGNIFICANT CHANGE UP (ref 4.8–10.8)
WBC # FLD AUTO: 8.01 K/UL — SIGNIFICANT CHANGE UP (ref 4.8–10.8)
WBC UR QL: 0 /HPF — SIGNIFICANT CHANGE UP (ref 0–5)
YEAST-LIKE CELLS: PRESENT

## 2025-04-08 PROCEDURE — 71045 X-RAY EXAM CHEST 1 VIEW: CPT | Mod: 26

## 2025-04-08 PROCEDURE — 99291 CRITICAL CARE FIRST HOUR: CPT

## 2025-04-08 PROCEDURE — 93010 ELECTROCARDIOGRAM REPORT: CPT

## 2025-04-08 RX ORDER — PIPERACILLIN-TAZO-DEXTROSE,ISO 3.375G/5
3.38 IV SOLUTION, PIGGYBACK PREMIX FROZEN(ML) INTRAVENOUS ONCE
Refills: 0 | Status: COMPLETED | OUTPATIENT
Start: 2025-04-08 | End: 2025-04-08

## 2025-04-08 RX ORDER — DEXTROMETHORPHAN HBR, GUAIFENESIN 200 MG/10ML
600 LIQUID ORAL EVERY 12 HOURS
Refills: 0 | Status: DISCONTINUED | OUTPATIENT
Start: 2025-04-08 | End: 2025-04-11

## 2025-04-08 RX ORDER — ACETAMINOPHEN 500 MG/5ML
1000 LIQUID (ML) ORAL ONCE
Refills: 0 | Status: COMPLETED | OUTPATIENT
Start: 2025-04-08 | End: 2025-04-08

## 2025-04-08 RX ORDER — PIPERACILLIN-TAZO-DEXTROSE,ISO 3.375G/5
3.38 IV SOLUTION, PIGGYBACK PREMIX FROZEN(ML) INTRAVENOUS EVERY 8 HOURS
Refills: 0 | Status: DISCONTINUED | OUTPATIENT
Start: 2025-04-08 | End: 2025-04-11

## 2025-04-08 RX ADMIN — Medication 1 TABLET(S): at 22:15

## 2025-04-08 RX ADMIN — Medication 3.38 GRAM(S): at 19:25

## 2025-04-08 RX ADMIN — Medication 1 TABLET(S): at 11:57

## 2025-04-08 RX ADMIN — OXYCODONE HYDROCHLORIDE 10 MILLIGRAM(S): 30 TABLET ORAL at 17:33

## 2025-04-08 RX ADMIN — ATORVASTATIN CALCIUM 40 MILLIGRAM(S): 80 TABLET, FILM COATED ORAL at 22:17

## 2025-04-08 RX ADMIN — FUROSEMIDE 20 MILLIGRAM(S): 10 INJECTION INTRAMUSCULAR; INTRAVENOUS at 17:29

## 2025-04-08 RX ADMIN — Medication 25 GRAM(S): at 15:07

## 2025-04-08 RX ADMIN — Medication 200 GRAM(S): at 11:54

## 2025-04-08 RX ADMIN — Medication 5 MILLIGRAM(S): at 05:06

## 2025-04-08 RX ADMIN — OXYCODONE HYDROCHLORIDE 10 MILLIGRAM(S): 30 TABLET ORAL at 04:22

## 2025-04-08 RX ADMIN — IPRATROPIUM BROMIDE AND ALBUTEROL SULFATE 3 MILLILITER(S): .5; 2.5 SOLUTION RESPIRATORY (INHALATION) at 02:14

## 2025-04-08 RX ADMIN — Medication 20 MILLIGRAM(S): at 05:06

## 2025-04-08 RX ADMIN — POLYETHYLENE GLYCOL 3350 17 GRAM(S): 17 POWDER, FOR SOLUTION ORAL at 11:58

## 2025-04-08 RX ADMIN — Medication 400 MILLIGRAM(S): at 20:30

## 2025-04-08 RX ADMIN — METOPROLOL SUCCINATE 12.5 MILLIGRAM(S): 50 TABLET, EXTENDED RELEASE ORAL at 17:28

## 2025-04-08 RX ADMIN — IPRATROPIUM BROMIDE AND ALBUTEROL SULFATE 3 MILLILITER(S): .5; 2.5 SOLUTION RESPIRATORY (INHALATION) at 20:27

## 2025-04-08 RX ADMIN — FUROSEMIDE 20 MILLIGRAM(S): 10 INJECTION INTRAMUSCULAR; INTRAVENOUS at 05:09

## 2025-04-08 RX ADMIN — LIDOCAINE HYDROCHLORIDE 1 PATCH: 20 JELLY TOPICAL at 11:30

## 2025-04-08 RX ADMIN — Medication 4 MILLILITER(S): at 13:49

## 2025-04-08 RX ADMIN — Medication 1 APPLICATION(S): at 05:06

## 2025-04-08 RX ADMIN — Medication 500 MILLIGRAM(S): at 11:57

## 2025-04-08 RX ADMIN — IPRATROPIUM BROMIDE AND ALBUTEROL SULFATE 3 MILLILITER(S): .5; 2.5 SOLUTION RESPIRATORY (INHALATION) at 08:17

## 2025-04-08 RX ADMIN — IPRATROPIUM BROMIDE AND ALBUTEROL SULFATE 3 MILLILITER(S): .5; 2.5 SOLUTION RESPIRATORY (INHALATION) at 15:07

## 2025-04-08 RX ADMIN — Medication 25 GRAM(S): at 22:16

## 2025-04-08 RX ADMIN — OXYCODONE HYDROCHLORIDE 10 MILLIGRAM(S): 30 TABLET ORAL at 18:30

## 2025-04-08 RX ADMIN — OXYCODONE HYDROCHLORIDE 10 MILLIGRAM(S): 30 TABLET ORAL at 04:34

## 2025-04-08 RX ADMIN — Medication 325 MILLIGRAM(S): at 11:57

## 2025-04-08 RX ADMIN — OXYCODONE HYDROCHLORIDE 10 MILLIGRAM(S): 30 TABLET ORAL at 10:00

## 2025-04-08 RX ADMIN — Medication 400 MILLIGRAM(S): at 04:35

## 2025-04-08 RX ADMIN — OXYCODONE HYDROCHLORIDE 10 MILLIGRAM(S): 30 TABLET ORAL at 14:00

## 2025-04-08 RX ADMIN — FOLIC ACID 1 MILLIGRAM(S): 1 TABLET ORAL at 11:57

## 2025-04-08 RX ADMIN — Medication 5 MILLIGRAM(S): at 17:28

## 2025-04-08 RX ADMIN — CLOPIDOGREL BISULFATE 75 MILLIGRAM(S): 75 TABLET, FILM COATED ORAL at 11:57

## 2025-04-08 RX ADMIN — Medication 4 MILLILITER(S): at 20:28

## 2025-04-08 RX ADMIN — Medication 1000 MILLIGRAM(S): at 14:00

## 2025-04-08 RX ADMIN — DEXTROMETHORPHAN HBR, GUAIFENESIN 600 MILLIGRAM(S): 200 LIQUID ORAL at 17:28

## 2025-04-08 RX ADMIN — ENOXAPARIN SODIUM 40 MILLIGRAM(S): 100 INJECTION SUBCUTANEOUS at 15:07

## 2025-04-08 RX ADMIN — OXYCODONE HYDROCHLORIDE 10 MILLIGRAM(S): 30 TABLET ORAL at 13:13

## 2025-04-08 RX ADMIN — LIDOCAINE HYDROCHLORIDE 1 PATCH: 20 JELLY TOPICAL at 22:17

## 2025-04-08 RX ADMIN — Medication 400 MILLIGRAM(S): at 05:12

## 2025-04-08 RX ADMIN — Medication 20 MILLIEQUIVALENT(S): at 11:57

## 2025-04-08 RX ADMIN — Medication 20 MILLIGRAM(S): at 17:28

## 2025-04-08 RX ADMIN — OXYCODONE HYDROCHLORIDE 10 MILLIGRAM(S): 30 TABLET ORAL at 09:01

## 2025-04-08 RX ADMIN — Medication 1000 MILLIGRAM(S): at 21:30

## 2025-04-08 RX ADMIN — METOPROLOL SUCCINATE 12.5 MILLIGRAM(S): 50 TABLET, EXTENDED RELEASE ORAL at 05:06

## 2025-04-08 RX ADMIN — Medication 400 MILLIGRAM(S): at 13:00

## 2025-04-08 RX ADMIN — LIDOCAINE HYDROCHLORIDE 1 PATCH: 20 JELLY TOPICAL at 05:13

## 2025-04-08 NOTE — PROGRESS NOTE ADULT - SUBJECTIVE AND OBJECTIVE BOX
OPERATIVE PROCEDURE(s):     CABGx4              POD #   4                  POD #                       55yMale  SURGEON(s): MICHELLE Young  SUBJECTIVE ASSESSMENT: pt seen and examined. pt had fever over night   Vital Signs Last 24 Hrs  T(F): 99.4 (08 Apr 2025 13:00), Max: 100.4 (08 Apr 2025 04:00)  HR: 91 (08 Apr 2025 13:15) (72 - 99)  BP: 148/65 (08 Apr 2025 13:15) (103/62 - 151/98)  BP(mean): 93 (08 Apr 2025 13:15) (76 - 121)  RR: 36 (08 Apr 2025 13:15) (19 - 62)  SpO2: 93% (08 Apr 2025 13:15) (92% - 100%)      I&O's Detail    07 Apr 2025 07:01  -  08 Apr 2025 07:00  --------------------------------------------------------  IN:    IV PiggyBack: 100 mL    Oral Fluid: 600 mL  Total IN: 700 mL    OUT:    Voided (mL): 1650 mL  Total OUT: 1650 mL        Net:   I&O's Detail    06 Apr 2025 07:01  -  07 Apr 2025 07:00  --------------------------------------------------------  Total NET: -795 mL      07 Apr 2025 07:01  -  08 Apr 2025 07:00  --------------------------------------------------------  Total NET: -950 mL        CAPILLARY BLOOD GLUCOSE      POCT Blood Glucose.: 146 mg/dL (08 Apr 2025 16:41)  POCT Blood Glucose.: 126 mg/dL (08 Apr 2025 11:39)  POCT Blood Glucose.: 128 mg/dL (08 Apr 2025 07:42)  POCT Blood Glucose.: 144 mg/dL (08 Apr 2025 05:36)  POCT Blood Glucose.: 152 mg/dL (07 Apr 2025 21:09)    Physical Exam:  General: NAD; A&Ox3, no focal deficits, clear speech   Cardiac: S1/S2, RRR, no murmur, no rubs  Lungs: unlabored respirations, CTA b/l, no wheeze, no rales, no crackles  Abdomen: Soft/NT/ND; positive bowel sounds x 4  Sternum: Intact, no click, incision healing well with no drainage  Incisions: Incisions clean/dry/intact  Extremities: mild edema b/l lower extremities; good capillary refill; no cyanosis; palpable 1+ pedal pulses b/l      BOWEL MOVEMENT:  [x] YES [] NO, If No, Timing since last BM:      Day #      LABS:                        8.5[L]  8.01  )-----------( 198      ( 08 Apr 2025 02:39 )             24.7[L]                        7.7[L]  9.26  )-----------( 146      ( 07 Apr 2025 01:10 )             22.2[L]    04-08    137  |  102  |  26[H]  ----------------------------<  127[H]  4.3   |  24  |  1.1  04-07    137  |  107  |  24[H]  ----------------------------<  115[H]  4.5   |  22  |  0.9    Ca    8.2[L]      08 Apr 2025 02:39  Mg     2.2     04-08    TPro  5.6[L] [6.0 - 8.0]  /  Alb  3.3[L] [3.5 - 5.2]  /  TBili  0.4 [0.2 - 1.2]  /  DBili  x   /  AST  47[H] [0 - 41]  /  ALT  50[H] [0 - 41]  /  AlkPhos  97 [30 - 115]  04-08      Urinalysis Basic - ( 08 Apr 2025 02:39 )    Color: x / Appearance: x / SG: x / pH: x  Gluc: 127 mg/dL / Ketone: x  / Bili: x / Urobili: x   Blood: x / Protein: x / Nitrite: x   Leuk Esterase: x / RBC: x / WBC x   Sq Epi: x / Non Sq Epi: x / Bacteria: x        RADIOLOGY & ADDITIONAL TESTS:  CXR: < from: Xray Chest 1 View- PORTABLE-Routine (Xray Chest 1 View- PORTABLE-Routine in AM.) (04.08.25 @ 05:08) >  IMPRESSION:    Grossly stable bibasilar opacities/effusions. No pneumothorax.    < end of copied text >    EKG: < from: 12 Lead ECG (04.08.25 @ 07:15) >    Ventricular Rate 70 BPM    Atrial Rate 70 BPM    P-R Interval 124 ms    QRS Duration 90 ms    Q-T Interval 402 ms    QTC Calculation(Bazett) 434 ms    P Axis 24 degrees    R Axis 13 degrees    T Axis 14 degrees    Diagnosis Line Normal sinus rhythm  Normal ECG    < end of copied text >    MEDICATIONS  (STANDING):  albumin human  5% IVPB 3000 milliLiter(s) IV Intermittent once  albuterol/ipratropium for Nebulization 3 milliLiter(s) Nebulizer every 6 hours  ascorbic acid 500 milliGRAM(s) Oral daily  aspirin enteric coated 325 milliGRAM(s) Oral daily  atorvastatin 40 milliGRAM(s) Oral at bedtime  bisacodyl 5 milliGRAM(s) Oral every 12 hours  chlorhexidine 2% Cloths 1 Application(s) Topical daily  clopidogrel Tablet 75 milliGRAM(s) Oral daily  dextrose 5%. 1000 milliLiter(s) (50 mL/Hr) IV Continuous <Continuous>  dextrose 5%. 1000 milliLiter(s) (100 mL/Hr) IV Continuous <Continuous>  dextrose 50% Injectable 50 milliLiter(s) IV Push every 15 minutes  enoxaparin Injectable 40 milliGRAM(s) SubCutaneous every 24 hours  famotidine    Tablet 20 milliGRAM(s) Oral two times a day  ferrous    sulfate 325 milliGRAM(s) Oral daily  folic acid 1 milliGRAM(s) Oral daily  furosemide   Injectable 20 milliGRAM(s) IV Push every 12 hours  glucagon  Injectable 1 milliGRAM(s) IntraMuscular once  guaiFENesin  milliGRAM(s) Oral every 12 hours  insulin lispro (ADMELOG) corrective regimen sliding scale   SubCutaneous three times a day before meals  insulin lispro (ADMELOG) corrective regimen sliding scale   SubCutaneous at bedtime  lidocaine   4% Patch 1 Patch Transdermal every 24 hours  metoprolol tartrate 12.5 milliGRAM(s) Oral every 12 hours  multivitamin/minerals 1 Tablet(s) Oral daily  piperacillin/tazobactam IVPB.. 3.375 Gram(s) IV Intermittent every 8 hours  polyethylene glycol 3350 17 Gram(s) Oral daily  potassium chloride    Tablet ER 20 milliEquivalent(s) Oral daily  senna 1 Tablet(s) Oral at bedtime  sodium chloride 3%  Inhalation 4 milliLiter(s) Inhalation every 12 hours    MEDICATIONS  (PRN):  dextrose Oral Gel 15 Gram(s) Oral once PRN Blood Glucose LESS THAN 70 milliGRAM(s)/deciliter  ibuprofen  Tablet. 400 milliGRAM(s) Oral every 8 hours PRN Temp greater or equal to 38C (100.4F)  melatonin 5 milliGRAM(s) Oral at bedtime PRN Insomnia  oxyCODONE    IR 5 milliGRAM(s) Oral every 4 hours PRN Moderate Pain (4 - 6)  oxyCODONE    IR 10 milliGRAM(s) Oral every 4 hours PRN Severe Pain (7 - 10)    LOVENOX:[x] YES [] NO  Dose: XX mg Q24H  SCD's: YES b/l  GI Prophylaxis: Protonix [], Pepcid [x], None [], (Contra-indication:.....)    Post-Op Beta-Blockers: Yes [x], No[], If No, then contraindication:  Post-Op Aspirin: Yes [x],  No [], If No, then contraindication:  Post-Op Statin: Yes [x], No[], If No, then contraindication:  Allergies    No Known Allergies    Intolerances      Ambulation/Activity Status: ambulate     Assessment/Plan:  55y Male status-post CABGx4 POD#4  - Case and plan discussed with CTU Intensivist and CT Surgeon - Dr. Young/Hank/Efe  - Continue CTU supportive care    - Continue DVT/GI prophylaxis  - Incentive Spirometry 10 times an hour  - Continue to advance physical activity as tolerated and continue PT/OT as directed  1. CAD: Continue ASA, statin, BB, plavix, pain control prn  2. HTN: cont to monitor, cont bb   3. A. Fib prophylaxis: cont to monitor electrolytes, cont bb   4. Post-op Hypoxia secondary to acute pulmonary insufficiency: cont nebs, wean o2 as tolerated, encourage incentive spirometry, lasix for noncardiogenic fluid overload    5. DM/Glucose Control: cont insulin sliding scale   6. fever: wbc normal, send UA, blood cx and start abx       Social Service Disposition:  home

## 2025-04-08 NOTE — PROGRESS NOTE ADULT - SUBJECTIVE AND OBJECTIVE BOX
CTU Attending Progress Daily Note     04 Apr 2025 17:12  Procedure: OPCABx4  POD#: 4    He has history of HTN (hypertension)    Anxiety, Stress, and Tension    Depression    Dyslipidemia    [place] (hard of hearing)    Former smoker      HPI: Patient is 55 year-old male former smoker with a past medical history HTN, HLD, and family history of CAD presented to the hospital for an elective diagnostic cardiac catheterization. Patient was seen at the cardiologist's office complaining of intermittent bilateral jaw pain radiating down to the upper left chest. Patient underwent a CCTA showing a calcium score of 768 and multivessel disease. Patient was referred for a LHC. Denies chest palpitations, lightheadedness, dizziness, syncope, nausea, vomiting, and bilateral leg swelling. Today, patient underwent LHC revealing 3vCAD. CT Surgery was consulted for an evaluation of myocardial revascularization via bypass grafts.     OR Data  Procedure: off pump CABGx4  Bypass: N/A  Cross Clamp: N/A   Pre LV Fxn: 50-55%      RV Fxn: nml  Post LV Fxn: 50-55%     RV Fxn: nml  Blood Products: None  Cell Saver: 228 cc  Fluids: 2.5L crystalloid/500 cc Albumin  Pacing Wires: A/V    Hospital Course:  4/4: Arrived intubated on Levophed. Extubated in short course and weaned off pressors  4/5 CT and fem line removed  4/6 started BB  4/7 R pleural effusion drained  4/8 Remains hypoxic, febile, POCUS with B/L consolidation / atelactasis, started abx    OBJECTIVE:  ICU Vital Signs Last 24 Hrs  T(C): 37.4 (08 Apr 2025 13:00), Max: 38 (08 Apr 2025 04:00)  T(F): 99.4 (08 Apr 2025 13:00), Max: 100.4 (08 Apr 2025 04:00)  HR: 91 (08 Apr 2025 13:15) (72 - 99)  BP: 148/65 (08 Apr 2025 13:15) (103/62 - 151/98)  BP(mean): 93 (08 Apr 2025 13:15) (76 - 121)  ABP: --  ABP(mean): --  RR: 36 (08 Apr 2025 13:15) (19 - 62)  SpO2: 93% (08 Apr 2025 13:15) (92% - 100%)    O2 Parameters below as of 08 Apr 2025 14:00  Patient On (Oxygen Delivery Method): nasal cannula  O2 Flow (L/min): 3        I&O's Summary    07 Apr 2025 07:01  -  08 Apr 2025 07:00  --------------------------------------------------------  IN: 700 mL / OUT: 1650 mL / NET: -950 mL    08 Apr 2025 07:01  -  08 Apr 2025 18:31  --------------------------------------------------------  IN: 720 mL / OUT: 300 mL / NET: 420 mL      I&O's Detail    07 Apr 2025 07:01  -  08 Apr 2025 07:00  --------------------------------------------------------  IN:    IV PiggyBack: 100 mL    Oral Fluid: 600 mL  Total IN: 700 mL    OUT:    Voided (mL): 1650 mL  Total OUT: 1650 mL    Total NET: -950 mL      08 Apr 2025 07:01  -  08 Apr 2025 18:31  --------------------------------------------------------  IN:    Oral Fluid: 720 mL  Total IN: 720 mL    OUT:    Voided (mL): 300 mL  Total OUT: 300 mL    Total NET: 420 mL        Adult Advanced Hemodynamics Last 24 Hrs  CVP(mm Hg): --  CVP(cm H2O): --  CO: --  CI: --  PA: --  PA(mean): --  PCWP: --  SVR: --  SVRI: --  PVR: --  PVRI: --    CAPILLARY BLOOD GLUCOSE      POCT Blood Glucose.: 146 mg/dL (08 Apr 2025 16:41)  POCT Blood Glucose.: 126 mg/dL (08 Apr 2025 11:39)  POCT Blood Glucose.: 128 mg/dL (08 Apr 2025 07:42)  POCT Blood Glucose.: 144 mg/dL (08 Apr 2025 05:36)  POCT Blood Glucose.: 152 mg/dL (07 Apr 2025 21:09)    LABS:                          8.5    8.01  )-----------( 198      ( 08 Apr 2025 02:39 )             24.7     04-08    137  |  102  |  26[H]  ----------------------------<  127[H]  4.3   |  24  |  1.1    Ca    8.2[L]      08 Apr 2025 02:39  Mg     2.2     04-08    TPro  5.6[L]  /  Alb  3.3[L]  /  TBili  0.4  /  DBili  x   /  AST  47[H]  /  ALT  50[H]  /  AlkPhos  97  04-08      Urinalysis Basic - ( 08 Apr 2025 02:39 )    Color: x / Appearance: x / SG: x / pH: x  Gluc: 127 mg/dL / Ketone: x  / Bili: x / Urobili: x   Blood: x / Protein: x / Nitrite: x   Leuk Esterase: x / RBC: x / WBC x   Sq Epi: x / Non Sq Epi: x / Bacteria: x        Home Medications:  lisinopril 40 mg oral tablet: 1 tab(s) orally once a day PATIENT NON COMPLIANT WITH MEDICATION (02 Apr 2025 09:30)    HOSPITAL MEDICATIONS:  MEDICATIONS  (STANDING):  albumin human  5% IVPB 3000 milliLiter(s) IV Intermittent once  albuterol/ipratropium for Nebulization 3 milliLiter(s) Nebulizer every 6 hours  ascorbic acid 500 milliGRAM(s) Oral daily  aspirin enteric coated 325 milliGRAM(s) Oral daily  atorvastatin 40 milliGRAM(s) Oral at bedtime  bisacodyl 5 milliGRAM(s) Oral every 12 hours  chlorhexidine 2% Cloths 1 Application(s) Topical daily  clopidogrel Tablet 75 milliGRAM(s) Oral daily  dextrose 5%. 1000 milliLiter(s) (50 mL/Hr) IV Continuous <Continuous>  dextrose 5%. 1000 milliLiter(s) (100 mL/Hr) IV Continuous <Continuous>  dextrose 50% Injectable 50 milliLiter(s) IV Push every 15 minutes  enoxaparin Injectable 40 milliGRAM(s) SubCutaneous every 24 hours  famotidine    Tablet 20 milliGRAM(s) Oral two times a day  ferrous    sulfate 325 milliGRAM(s) Oral daily  folic acid 1 milliGRAM(s) Oral daily  furosemide   Injectable 20 milliGRAM(s) IV Push every 12 hours  glucagon  Injectable 1 milliGRAM(s) IntraMuscular once  guaiFENesin  milliGRAM(s) Oral every 12 hours  insulin lispro (ADMELOG) corrective regimen sliding scale   SubCutaneous three times a day before meals  insulin lispro (ADMELOG) corrective regimen sliding scale   SubCutaneous at bedtime  lidocaine   4% Patch 1 Patch Transdermal every 24 hours  metoprolol tartrate 12.5 milliGRAM(s) Oral every 12 hours  multivitamin/minerals 1 Tablet(s) Oral daily  piperacillin/tazobactam IVPB.. 3.375 Gram(s) IV Intermittent every 8 hours  polyethylene glycol 3350 17 Gram(s) Oral daily  potassium chloride    Tablet ER 20 milliEquivalent(s) Oral daily  senna 1 Tablet(s) Oral at bedtime  sodium chloride 3%  Inhalation 4 milliLiter(s) Inhalation every 12 hours    MEDICATIONS  (PRN):  dextrose Oral Gel 15 Gram(s) Oral once PRN Blood Glucose LESS THAN 70 milliGRAM(s)/deciliter  ibuprofen  Tablet. 400 milliGRAM(s) Oral every 8 hours PRN Temp greater or equal to 38C (100.4F)  melatonin 5 milliGRAM(s) Oral at bedtime PRN Insomnia  oxyCODONE    IR 5 milliGRAM(s) Oral every 4 hours PRN Moderate Pain (4 - 6)  oxyCODONE    IR 10 milliGRAM(s) Oral every 4 hours PRN Severe Pain (7 - 10)    RADIOLOGY:  Chest X-ray Reviewed    REVIEW OF SYSTEMS:  CONSTITUTIONAL: [X] all negative; [ ] weakness, [ ] fevers, [ ] chills  EYES/ENT: [X] all negative; [ ] visual changes, [ ] vertigo, [ ] throat pain   NECK: [X] all negative; [ ] pain, [ ] stiffness  RESPIRATORY: [] all negative, [ ] cough, [ ] wheezing, [ ] hemoptysis, [ ] shortness of breath  CARDIOVASCULAR: [] all negative; [ ] chest pain, [ ] palpitations, [ ] orthopnea  GASTROINTESTINAL: [X] all negative; [ ]abdominal pain, [ ] nausea, [ ] vomiting, [ ] hematemesis, [ ] diarrhea, [ ] constipation, [ ] melena, [ ] hematochezia.  GENITOURINARY: [X] all negative; [ ] dysuria, [ ] frequency, [ ] hematuria  NEUROLOGICAL: [X] all negative; [ ] numbness, [ ] weakness  SKIN: [X] all negative; [ ] itching, [ ] burning, [ ] rashes, [ ] lesions   All other review of systems is negative unless indicated above.  [  ] Unable to assess ROS because     PHYSICAL EXAM:          CONSTITUTIONAL: No acute distress  	SKIN: warm, dry  	EYES: PERRL, EOM, no conj injection, sclera clear  	NECK: JVP mid neck   	CARD: Regular rate and rhythm, no murmurs, rubs or gallops  	RESP: CTA B/L; no wheezes, rales or rhonchi.  	ABD: Soft, not tender, not distended, bowel sounds present  	EXT: 2+ pulses on all extremities, no clubbing, cyanosis or edema  	NEURO: A+Ox3, strength intact throughout    Assessment   s/p OPCABx4 POD 5 currently progressing well.     Plan:    Neuro:  Multimodal pain management  Tylenol, Lidoderm patch, gabapentin, opiates as needed  Monitor neuro status in the post op period    CV:  S/P OPCAB  Monitor perfusion, , lactate, UOP  ASA, statin  On beta blockers  Lasix BID - goal negative balance  Plavix    Resp:  Continuous pulse oximetry monitoring  IS / Chest PT  OOBTC and Ambulate once extubated  Nebulizers as needed  R pleural effusion drained  Still with fever and B/L congestion  Start Abx  Ipatropium and 3% nebs - aggressive pulm toilet    GI:  Heart healthy diet  Bowel Regimen - escalate as needed  PUD ppx per protocol    Renal  High risk for BRENT post surgery  Monitor UOP, lytes, creatinine  Diuretics as needed for negative 2L fluid balance  Keep K > 4, Mg > 2    Endo:  Tight glucose control per CTICU protocl    Heme:  Acute blood loss anemia post surgery  High risk for thrombocytopenia  DVT ppx with lovenox  IV iron / FA / MVI    ID:  Febrile  no WBC  Check procal  Zosyn for possible PNA  Send BCx  Check UA    Upon my evaluation, the above patient has a high probability of imminent or life threatening deterioration due to a high risk for shock, cardiogenic shock, arrhythmias, acute blood loss, acute kidney injury and acute pulmonary insufficiency which required my direct attention, intervention, and personal management.  Total time spent includes review of radiology results, ordering, interpreting and reviewing diagnostic studies / lab tests with immediate assessment and treatment, consultant collaboration on findings and treatment options, monitoring for potential decompensation, obtaining history from family, EMT, nursing home staff and/or treating physicians; directing the titration of pressors, oxygen support devices, fluid resuscitation, interpretation of cardiac output measurements, interpretation of radiological assessments, interpretation of EKG / rhythm strips, telemetry.  This time did not overlap with the management of other patients or performing separately reportable procedures.      Management of this patient was discussed with the CT surgery attending and mid-level providers.    I acknowledge the use of copied documentation.  All copy forward documentation is my own and has been reviewed and revised as appropriate.  If no changes were made, it is because that information remains the same.

## 2025-04-09 LAB
ALBUMIN SERPL ELPH-MCNC: 3.2 G/DL — LOW (ref 3.5–5.2)
ALP SERPL-CCNC: 141 U/L — HIGH (ref 30–115)
ALT FLD-CCNC: 66 U/L — HIGH (ref 0–41)
ANION GAP SERPL CALC-SCNC: 9 MMOL/L — SIGNIFICANT CHANGE UP (ref 7–14)
AST SERPL-CCNC: 57 U/L — HIGH (ref 0–41)
BASOPHILS # BLD AUTO: 0.04 K/UL — SIGNIFICANT CHANGE UP (ref 0–0.2)
BASOPHILS NFR BLD AUTO: 0.5 % — SIGNIFICANT CHANGE UP (ref 0–1)
BILIRUB SERPL-MCNC: 0.4 MG/DL — SIGNIFICANT CHANGE UP (ref 0.2–1.2)
BUN SERPL-MCNC: 24 MG/DL — HIGH (ref 10–20)
CALCIUM SERPL-MCNC: 8.1 MG/DL — LOW (ref 8.4–10.5)
CHLORIDE SERPL-SCNC: 99 MMOL/L — SIGNIFICANT CHANGE UP (ref 98–110)
CO2 SERPL-SCNC: 24 MMOL/L — SIGNIFICANT CHANGE UP (ref 17–32)
CREAT SERPL-MCNC: 0.9 MG/DL — SIGNIFICANT CHANGE UP (ref 0.7–1.5)
EGFR: 100 ML/MIN/1.73M2 — SIGNIFICANT CHANGE UP
EGFR: 100 ML/MIN/1.73M2 — SIGNIFICANT CHANGE UP
EOSINOPHIL # BLD AUTO: 0.22 K/UL — SIGNIFICANT CHANGE UP (ref 0–0.7)
EOSINOPHIL NFR BLD AUTO: 2.8 % — SIGNIFICANT CHANGE UP (ref 0–8)
GLUCOSE BLDC GLUCOMTR-MCNC: 136 MG/DL — HIGH (ref 70–99)
GLUCOSE BLDC GLUCOMTR-MCNC: 138 MG/DL — HIGH (ref 70–99)
GLUCOSE BLDC GLUCOMTR-MCNC: 143 MG/DL — HIGH (ref 70–99)
GLUCOSE SERPL-MCNC: 125 MG/DL — HIGH (ref 70–99)
HCT VFR BLD CALC: 22.1 % — LOW (ref 42–52)
HGB BLD-MCNC: 7.6 G/DL — LOW (ref 14–18)
IMM GRANULOCYTES NFR BLD AUTO: 0.8 % — HIGH (ref 0.1–0.3)
LYMPHOCYTES # BLD AUTO: 1.43 K/UL — SIGNIFICANT CHANGE UP (ref 1.2–3.4)
LYMPHOCYTES # BLD AUTO: 18.3 % — LOW (ref 20.5–51.1)
MAGNESIUM SERPL-MCNC: 2.1 MG/DL — SIGNIFICANT CHANGE UP (ref 1.8–2.4)
MCHC RBC-ENTMCNC: 30.6 PG — SIGNIFICANT CHANGE UP (ref 27–31)
MCHC RBC-ENTMCNC: 34.4 G/DL — SIGNIFICANT CHANGE UP (ref 32–37)
MCV RBC AUTO: 89.1 FL — SIGNIFICANT CHANGE UP (ref 80–94)
MONOCYTES # BLD AUTO: 0.96 K/UL — HIGH (ref 0.1–0.6)
MONOCYTES NFR BLD AUTO: 12.3 % — HIGH (ref 1.7–9.3)
NEUTROPHILS # BLD AUTO: 5.1 K/UL — SIGNIFICANT CHANGE UP (ref 1.4–6.5)
NEUTROPHILS NFR BLD AUTO: 65.3 % — SIGNIFICANT CHANGE UP (ref 42.2–75.2)
NRBC BLD AUTO-RTO: 0 /100 WBCS — SIGNIFICANT CHANGE UP (ref 0–0)
PLATELET # BLD AUTO: 225 K/UL — SIGNIFICANT CHANGE UP (ref 130–400)
PMV BLD: 10.3 FL — SIGNIFICANT CHANGE UP (ref 7.4–10.4)
POTASSIUM SERPL-MCNC: 4.4 MMOL/L — SIGNIFICANT CHANGE UP (ref 3.5–5)
POTASSIUM SERPL-SCNC: 4.4 MMOL/L — SIGNIFICANT CHANGE UP (ref 3.5–5)
PROCALCITONIN SERPL-MCNC: 0.25 NG/ML — HIGH (ref 0.02–0.1)
PROT SERPL-MCNC: 5.2 G/DL — LOW (ref 6–8)
RBC # BLD: 2.48 M/UL — LOW (ref 4.7–6.1)
RBC # FLD: 13.1 % — SIGNIFICANT CHANGE UP (ref 11.5–14.5)
SODIUM SERPL-SCNC: 132 MMOL/L — LOW (ref 135–146)
WBC # BLD: 7.81 K/UL — SIGNIFICANT CHANGE UP (ref 4.8–10.8)
WBC # FLD AUTO: 7.81 K/UL — SIGNIFICANT CHANGE UP (ref 4.8–10.8)

## 2025-04-09 PROCEDURE — 99232 SBSQ HOSP IP/OBS MODERATE 35: CPT | Mod: 24

## 2025-04-09 PROCEDURE — 71046 X-RAY EXAM CHEST 2 VIEWS: CPT | Mod: 26

## 2025-04-09 PROCEDURE — 99291 CRITICAL CARE FIRST HOUR: CPT

## 2025-04-09 PROCEDURE — 93010 ELECTROCARDIOGRAM REPORT: CPT

## 2025-04-09 RX ORDER — LACTULOSE 10 G/15ML
20 SOLUTION ORAL EVERY 6 HOURS
Refills: 0 | Status: DISCONTINUED | OUTPATIENT
Start: 2025-04-09 | End: 2025-04-10

## 2025-04-09 RX ORDER — FUROSEMIDE 10 MG/ML
40 INJECTION INTRAMUSCULAR; INTRAVENOUS ONCE
Refills: 0 | Status: COMPLETED | OUTPATIENT
Start: 2025-04-09 | End: 2025-04-09

## 2025-04-09 RX ORDER — KETOROLAC TROMETHAMINE 30 MG/ML
15 INJECTION, SOLUTION INTRAMUSCULAR; INTRAVENOUS ONCE
Refills: 0 | Status: DISCONTINUED | OUTPATIENT
Start: 2025-04-09 | End: 2025-04-09

## 2025-04-09 RX ADMIN — IPRATROPIUM BROMIDE AND ALBUTEROL SULFATE 3 MILLILITER(S): .5; 2.5 SOLUTION RESPIRATORY (INHALATION) at 02:38

## 2025-04-09 RX ADMIN — Medication 25 GRAM(S): at 22:58

## 2025-04-09 RX ADMIN — OXYCODONE HYDROCHLORIDE 10 MILLIGRAM(S): 30 TABLET ORAL at 14:14

## 2025-04-09 RX ADMIN — Medication 1 TABLET(S): at 22:58

## 2025-04-09 RX ADMIN — Medication 1 APPLICATION(S): at 05:17

## 2025-04-09 RX ADMIN — DEXTROMETHORPHAN HBR, GUAIFENESIN 600 MILLIGRAM(S): 200 LIQUID ORAL at 05:33

## 2025-04-09 RX ADMIN — Medication 20 MILLIGRAM(S): at 18:18

## 2025-04-09 RX ADMIN — METOPROLOL SUCCINATE 12.5 MILLIGRAM(S): 50 TABLET, EXTENDED RELEASE ORAL at 18:18

## 2025-04-09 RX ADMIN — LIDOCAINE HYDROCHLORIDE 1 PATCH: 20 JELLY TOPICAL at 08:10

## 2025-04-09 RX ADMIN — Medication 325 MILLIGRAM(S): at 11:29

## 2025-04-09 RX ADMIN — Medication 25 GRAM(S): at 05:16

## 2025-04-09 RX ADMIN — LIDOCAINE HYDROCHLORIDE 1 PATCH: 20 JELLY TOPICAL at 22:58

## 2025-04-09 RX ADMIN — OXYCODONE HYDROCHLORIDE 10 MILLIGRAM(S): 30 TABLET ORAL at 11:00

## 2025-04-09 RX ADMIN — IPRATROPIUM BROMIDE AND ALBUTEROL SULFATE 3 MILLILITER(S): .5; 2.5 SOLUTION RESPIRATORY (INHALATION) at 14:18

## 2025-04-09 RX ADMIN — Medication 5 MILLIGRAM(S): at 05:17

## 2025-04-09 RX ADMIN — POLYETHYLENE GLYCOL 3350 17 GRAM(S): 17 POWDER, FOR SOLUTION ORAL at 11:28

## 2025-04-09 RX ADMIN — Medication 500 MILLIGRAM(S): at 11:28

## 2025-04-09 RX ADMIN — METOPROLOL SUCCINATE 12.5 MILLIGRAM(S): 50 TABLET, EXTENDED RELEASE ORAL at 05:33

## 2025-04-09 RX ADMIN — OXYCODONE HYDROCHLORIDE 10 MILLIGRAM(S): 30 TABLET ORAL at 05:34

## 2025-04-09 RX ADMIN — FUROSEMIDE 20 MILLIGRAM(S): 10 INJECTION INTRAMUSCULAR; INTRAVENOUS at 18:18

## 2025-04-09 RX ADMIN — FOLIC ACID 1 MILLIGRAM(S): 1 TABLET ORAL at 11:28

## 2025-04-09 RX ADMIN — KETOROLAC TROMETHAMINE 15 MILLIGRAM(S): 30 INJECTION, SOLUTION INTRAMUSCULAR; INTRAVENOUS at 19:30

## 2025-04-09 RX ADMIN — Medication 1 TABLET(S): at 11:28

## 2025-04-09 RX ADMIN — OXYCODONE HYDROCHLORIDE 10 MILLIGRAM(S): 30 TABLET ORAL at 10:05

## 2025-04-09 RX ADMIN — Medication 20 MILLIEQUIVALENT(S): at 11:28

## 2025-04-09 RX ADMIN — Medication 4 MILLILITER(S): at 08:21

## 2025-04-09 RX ADMIN — LACTULOSE 20 GRAM(S): 10 SOLUTION ORAL at 23:42

## 2025-04-09 RX ADMIN — DEXTROMETHORPHAN HBR, GUAIFENESIN 600 MILLIGRAM(S): 200 LIQUID ORAL at 18:18

## 2025-04-09 RX ADMIN — FUROSEMIDE 40 MILLIGRAM(S): 10 INJECTION INTRAMUSCULAR; INTRAVENOUS at 05:16

## 2025-04-09 RX ADMIN — Medication 25 GRAM(S): at 14:15

## 2025-04-09 RX ADMIN — OXYCODONE HYDROCHLORIDE 10 MILLIGRAM(S): 30 TABLET ORAL at 06:34

## 2025-04-09 RX ADMIN — Medication 4 MILLILITER(S): at 20:20

## 2025-04-09 RX ADMIN — IPRATROPIUM BROMIDE AND ALBUTEROL SULFATE 3 MILLILITER(S): .5; 2.5 SOLUTION RESPIRATORY (INHALATION) at 08:20

## 2025-04-09 RX ADMIN — ENOXAPARIN SODIUM 40 MILLIGRAM(S): 100 INJECTION SUBCUTANEOUS at 14:15

## 2025-04-09 RX ADMIN — ATORVASTATIN CALCIUM 40 MILLIGRAM(S): 80 TABLET, FILM COATED ORAL at 22:58

## 2025-04-09 RX ADMIN — LACTULOSE 20 GRAM(S): 10 SOLUTION ORAL at 18:18

## 2025-04-09 RX ADMIN — Medication 20 MILLIGRAM(S): at 05:33

## 2025-04-09 RX ADMIN — Medication 5 MILLIGRAM(S): at 18:17

## 2025-04-09 RX ADMIN — KETOROLAC TROMETHAMINE 15 MILLIGRAM(S): 30 INJECTION, SOLUTION INTRAMUSCULAR; INTRAVENOUS at 18:36

## 2025-04-09 RX ADMIN — Medication 325 MILLIGRAM(S): at 11:28

## 2025-04-09 RX ADMIN — CLOPIDOGREL BISULFATE 75 MILLIGRAM(S): 75 TABLET, FILM COATED ORAL at 11:28

## 2025-04-09 RX ADMIN — LACTULOSE 20 GRAM(S): 10 SOLUTION ORAL at 10:04

## 2025-04-09 RX ADMIN — LIDOCAINE HYDROCHLORIDE 1 PATCH: 20 JELLY TOPICAL at 09:13

## 2025-04-09 RX ADMIN — IPRATROPIUM BROMIDE AND ALBUTEROL SULFATE 3 MILLILITER(S): .5; 2.5 SOLUTION RESPIRATORY (INHALATION) at 20:20

## 2025-04-09 RX ADMIN — OXYCODONE HYDROCHLORIDE 10 MILLIGRAM(S): 30 TABLET ORAL at 15:00

## 2025-04-09 NOTE — DIETITIAN INITIAL EVALUATION ADULT - ADD RECOMMEND
1. Continue with current diet order; if appetite declines, supplement with Ensure Plus Hp (350 kcal, 20g protein per carton)  2. Encourage PO intake    Moderate risk f/u

## 2025-04-09 NOTE — DIETITIAN INITIAL EVALUATION ADULT - PERTINENT LABORATORY DATA
04-09    132[L]  |  99  |  24[H]  ----------------------------<  125[H]  4.4   |  24  |  0.9    Ca    8.1[L]      09 Apr 2025 02:43  Mg     2.1     04-09    TPro  5.2[L]  /  Alb  3.2[L]  /  TBili  0.4  /  DBili  x   /  AST  57[H]  /  ALT  66[H]  /  AlkPhos  141[H]  04-09  POCT Blood Glucose.: 138 mg/dL (04-09-25 @ 11:26)  A1C with Estimated Average Glucose Result: 5.6 % (04-02-25 @ 14:00)

## 2025-04-09 NOTE — DIETITIAN INITIAL EVALUATION ADULT - NAME AND PHONE
Juanis [id number] or TEAMS    Nutrition Interventions: Meals, snacks, supplements; Nutrition Monitoring: Diet order, PO intake, weights, labs, NFPF, body composition, BM and tolerance to medical food supplements

## 2025-04-09 NOTE — DIETITIAN INITIAL EVALUATION ADULT - OTHER CALCULATIONS
Using ABW: ENERGY: 5754-8967 kcal/day (MSJ 1607.520*1-1.2 SF); PROTEIN: 79-95 g/day (1-1.2 g/kg); FLUID: 3140-2720 mL/day (25-30 mL/kg) -- with consideration for age, BMI, s/p CABG

## 2025-04-09 NOTE — DIETITIAN INITIAL EVALUATION ADULT - OTHER INFO
Pertinent Medical Information: Per H&P, pt is a 55 year-old male former smoker with a past medical history HTN, HLD, and family history of CAD presented to the hospital for an elective diagnostic cardiac catheterization. s/p CABGx4 POD#4.    Pertinent Subjective Information: Pt generally has a good appetite; consumed 100% of meals on 4/8. Pt was working on his breakfast this morning; feeling anxious this morning. Pt states he will consume most of his breakfast today. Tolerating diet texture/consistency well. No nausea or vomiting reported.     Weight hx: #/79.5 KG -- checked 1 month ago per pt. Current dosing weight is 79 KG. Weight stable within 1 month compared to current dosing weight. Pt does not meet weight criteria for malnutrition at this time.

## 2025-04-09 NOTE — DIETITIAN INITIAL EVALUATION ADULT - ORAL INTAKE PTA/DIET HISTORY
Pt reports having a fair to good appetite; generally consumes 1 big meal a day + snacks throughout the day d/t a high demand job. Snacks consists of protein shakes, nuts. Denies drinking protein shake supplements. Denies taking vitamin or mineral supplements. No chewing or swallowing difficulties noted with regular textured food.

## 2025-04-09 NOTE — DIETITIAN INITIAL EVALUATION ADULT - NSICDXPASTMEDICALHX_GEN_ALL_CORE_FT
PAST MEDICAL HISTORY:  Anxiety, Stress, and Tension     Depression     Dyslipidemia     Former smoker     [place] (hard of hearing)     HTN (hypertension)

## 2025-04-09 NOTE — DIETITIAN INITIAL EVALUATION ADULT - EDUCATION DIETARY MODIFICATIONS
Discussed importance of PO intake, current diet order and supplements if appetite declines./(2) meets goals/outcomes/verbalization

## 2025-04-09 NOTE — DIETITIAN INITIAL EVALUATION ADULT - NS FNS DIET ORDER
Diet, Regular:   Consistent Carbohydrate {Evening Snack} (CSTCHOSN)  DASH/TLC {Sodium & Cholesterol Restricted} (DASH) (04-05-25 @ 11:11) [Active]

## 2025-04-09 NOTE — PROGRESS NOTE ADULT - SUBJECTIVE AND OBJECTIVE BOX
CTU Attending Progress Daily Note     04 Apr 2025 17:12  Procedure: OPCABx4  POD#: 5    He has history of HTN (hypertension)    Anxiety, Stress, and Tension    Depression    Dyslipidemia    [place] (hard of hearing)    Former smoker      HPI: Patient is 55 year-old male former smoker with a past medical history HTN, HLD, and family history of CAD presented to the hospital for an elective diagnostic cardiac catheterization. Patient was seen at the cardiologist's office complaining of intermittent bilateral jaw pain radiating down to the upper left chest. Patient underwent a CCTA showing a calcium score of 768 and multivessel disease. Patient was referred for a LHC. Denies chest palpitations, lightheadedness, dizziness, syncope, nausea, vomiting, and bilateral leg swelling. Today, patient underwent LHC revealing 3vCAD. CT Surgery was consulted for an evaluation of myocardial revascularization via bypass grafts.     OR Data  Procedure: off pump CABGx4  Bypass: N/A  Cross Clamp: N/A   Pre LV Fxn: 50-55%      RV Fxn: nml  Post LV Fxn: 50-55%     RV Fxn: nml  Blood Products: None  Cell Saver: 228 cc  Fluids: 2.5L crystalloid/500 cc Albumin  Pacing Wires: A/V    Hospital Course:  4/4: Arrived intubated on Levophed. Extubated in short course and weaned off pressors  4/5 CT and fem line removed  4/6 started BB  4/7 R pleural effusion drained  4/8 Remains hypoxic, febile, POCUS with B/L consolidation / atelactasis, procal elevated, started abx  4/9 off oxygen    OBJECTIVE:  ICU Vital Signs Last 24 Hrs  T(C): 37.1 (09 Apr 2025 12:00), Max: 37.1 (09 Apr 2025 12:00)  T(F): 98.7 (09 Apr 2025 12:00), Max: 98.7 (09 Apr 2025 12:00)  HR: 87 (09 Apr 2025 14:00) (78 - 97)  BP: 133/62 (09 Apr 2025 14:00) (102/62 - 156/74)  BP(mean): 89 (09 Apr 2025 14:00) (77 - 118)  ABP: --  ABP(mean): --  RR: 31 (09 Apr 2025 14:00) (17 - 39)  SpO2: 96% (09 Apr 2025 14:00) (87% - 99%)    O2 Parameters below as of 09 Apr 2025 15:00  Patient On (Oxygen Delivery Method): room air          I&O's Summary    08 Apr 2025 07:01  -  09 Apr 2025 07:00  --------------------------------------------------------  IN: 1560 mL / OUT: 1750 mL / NET: -190 mL    09 Apr 2025 07:01  -  09 Apr 2025 17:35  --------------------------------------------------------  IN: 480 mL / OUT: 425 mL / NET: 55 mL      I&O's Detail    08 Apr 2025 07:01  -  09 Apr 2025 07:00  --------------------------------------------------------  IN:    IV PiggyBack: 200 mL    IV PiggyBack: 100 mL    Oral Fluid: 1260 mL  Total IN: 1560 mL    OUT:    Voided (mL): 1750 mL  Total OUT: 1750 mL    Total NET: -190 mL      09 Apr 2025 07:01  -  09 Apr 2025 17:35  --------------------------------------------------------  IN:    Oral Fluid: 480 mL  Total IN: 480 mL    OUT:    Voided (mL): 425 mL  Total OUT: 425 mL    Total NET: 55 mL        Adult Advanced Hemodynamics Last 24 Hrs  CVP(mm Hg): --  CVP(cm H2O): --  CO: --  CI: --  PA: --  PA(mean): --  PCWP: --  SVR: --  SVRI: --  PVR: --  PVRI: --    CAPILLARY BLOOD GLUCOSE      POCT Blood Glucose.: 143 mg/dL (09 Apr 2025 16:48)  POCT Blood Glucose.: 138 mg/dL (09 Apr 2025 11:26)  POCT Blood Glucose.: 136 mg/dL (09 Apr 2025 06:59)    LABS:                          7.6    7.81  )-----------( 225      ( 09 Apr 2025 02:43 )             22.1     04-09    132[L]  |  99  |  24[H]  ----------------------------<  125[H]  4.4   |  24  |  0.9    Ca    8.1[L]      09 Apr 2025 02:43  Mg     2.1     04-09    TPro  5.2[L]  /  Alb  3.2[L]  /  TBili  0.4  /  DBili  x   /  AST  57[H]  /  ALT  66[H]  /  AlkPhos  141[H]  04-09      Urinalysis Basic - ( 09 Apr 2025 02:43 )    Color: x / Appearance: x / SG: x / pH: x  Gluc: 125 mg/dL / Ketone: x  / Bili: x / Urobili: x   Blood: x / Protein: x / Nitrite: x   Leuk Esterase: x / RBC: x / WBC x   Sq Epi: x / Non Sq Epi: x / Bacteria: x        Home Medications:  lisinopril 40 mg oral tablet: 1 tab(s) orally once a day PATIENT NON COMPLIANT WITH MEDICATION (02 Apr 2025 09:30)    HOSPITAL MEDICATIONS:  MEDICATIONS  (STANDING):  albumin human  5% IVPB 3000 milliLiter(s) IV Intermittent once  albuterol/ipratropium for Nebulization 3 milliLiter(s) Nebulizer every 6 hours  ascorbic acid 500 milliGRAM(s) Oral daily  aspirin enteric coated 325 milliGRAM(s) Oral daily  atorvastatin 40 milliGRAM(s) Oral at bedtime  bisacodyl 5 milliGRAM(s) Oral every 12 hours  chlorhexidine 2% Cloths 1 Application(s) Topical daily  clopidogrel Tablet 75 milliGRAM(s) Oral daily  dextrose 5%. 1000 milliLiter(s) (50 mL/Hr) IV Continuous <Continuous>  dextrose 5%. 1000 milliLiter(s) (100 mL/Hr) IV Continuous <Continuous>  dextrose 50% Injectable 50 milliLiter(s) IV Push every 15 minutes  enoxaparin Injectable 40 milliGRAM(s) SubCutaneous every 24 hours  famotidine    Tablet 20 milliGRAM(s) Oral two times a day  ferrous    sulfate 325 milliGRAM(s) Oral daily  folic acid 1 milliGRAM(s) Oral daily  furosemide   Injectable 20 milliGRAM(s) IV Push every 12 hours  glucagon  Injectable 1 milliGRAM(s) IntraMuscular once  guaiFENesin  milliGRAM(s) Oral every 12 hours  insulin lispro (ADMELOG) corrective regimen sliding scale   SubCutaneous three times a day before meals  insulin lispro (ADMELOG) corrective regimen sliding scale   SubCutaneous at bedtime  lactulose Syrup 20 Gram(s) Oral every 6 hours  lidocaine   4% Patch 1 Patch Transdermal every 24 hours  metoprolol tartrate 12.5 milliGRAM(s) Oral every 12 hours  multivitamin/minerals 1 Tablet(s) Oral daily  piperacillin/tazobactam IVPB.. 3.375 Gram(s) IV Intermittent every 8 hours  polyethylene glycol 3350 17 Gram(s) Oral daily  senna 1 Tablet(s) Oral at bedtime  sodium chloride 3%  Inhalation 4 milliLiter(s) Inhalation every 12 hours    MEDICATIONS  (PRN):  dextrose Oral Gel 15 Gram(s) Oral once PRN Blood Glucose LESS THAN 70 milliGRAM(s)/deciliter  ibuprofen  Tablet. 400 milliGRAM(s) Oral every 8 hours PRN Temp greater or equal to 38C (100.4F)  ketorolac   Injectable 15 milliGRAM(s) IV Push once PRN Moderate Pain (4 - 6)  melatonin 5 milliGRAM(s) Oral at bedtime PRN Insomnia  oxyCODONE    IR 5 milliGRAM(s) Oral every 4 hours PRN Moderate Pain (4 - 6)  oxyCODONE    IR 10 milliGRAM(s) Oral every 4 hours PRN Severe Pain (7 - 10)    RADIOLOGY:  Chest X-ray Reviewed    REVIEW OF SYSTEMS:  CONSTITUTIONAL: [X] all negative; [ ] weakness, [ ] fevers, [ ] chills  EYES/ENT: [X] all negative; [ ] visual changes, [ ] vertigo, [ ] throat pain   NECK: [X] all negative; [ ] pain, [ ] stiffness  RESPIRATORY: [] all negative, [ ] cough, [ ] wheezing, [ ] hemoptysis, [ ] shortness of breath  CARDIOVASCULAR: [] all negative; [ ] chest pain, [ ] palpitations, [ ] orthopnea  GASTROINTESTINAL: [X] all negative; [ ]abdominal pain, [ ] nausea, [ ] vomiting, [ ] hematemesis, [ ] diarrhea, [ ] constipation, [ ] melena, [ ] hematochezia.  GENITOURINARY: [X] all negative; [ ] dysuria, [ ] frequency, [ ] hematuria  NEUROLOGICAL: [X] all negative; [ ] numbness, [ ] weakness  SKIN: [X] all negative; [ ] itching, [ ] burning, [ ] rashes, [ ] lesions   All other review of systems is negative unless indicated above.  [  ] Unable to assess ROS because     PHYSICAL EXAM:          CONSTITUTIONAL: No acute distress  	SKIN: warm, dry  	EYES: PERRL, EOM, no conj injection, sclera clear  	NECK: JVP mid neck   	CARD: Regular rate and rhythm, no murmurs, rubs or gallops  	RESP: CTA B/L; no wheezes, rales or rhonchi.  	ABD: Soft, not tender, not distended, bowel sounds present  	EXT: 2+ pulses on all extremities, no clubbing, cyanosis or edema  	NEURO: A+Ox3, strength intact throughout    Assessment   s/p OPCABx4 currently progressing well.     Plan:    Neuro:  Multimodal pain management  Tylenol, Lidoderm patch, gabapentin, opiates as needed  Monitor neuro status in the post op period    CV:  S/P OPCAB  Monitor perfusion, , lactate, UOP  ASA, statin  On beta blockers  Lasix BID - goal negative balance  Plavix    Resp:  Continuous pulse oximetry monitoring  IS / Chest PT  OOBTC and Ambulate once extubated  Nebulizers as needed  R pleural effusion drained  Fevers improved  Trial of IPV  Ipatropium and 3% nebs - aggressive pulm toilet    GI:  Heart healthy diet  Bowel Regimen - escalate as needed  PUD ppx per protocol    Renal  High risk for BRENT post surgery  Monitor UOP, lytes, creatinine  Diuretics as needed for negative fluid balance  Keep K > 4, Mg > 2    Endo:  Tight glucose control per CTICU protocl    Heme:  Acute blood loss anemia post surgery  High risk for thrombocytopenia  DVT ppx with lovenox  IV iron / FA / MVI    ID:  Febrile  no WBC, procal elevated  Zosyn for possible PNA  BCx pending  UA negative    Upon my evaluation, the above patient has a high probability of imminent or life threatening deterioration due to a high risk for shock, cardiogenic shock, arrhythmias, acute blood loss, acute kidney injury and acute pulmonary insufficiency which required my direct attention, intervention, and personal management.  Total time spent includes review of radiology results, ordering, interpreting and reviewing diagnostic studies / lab tests with immediate assessment and treatment, consultant collaboration on findings and treatment options, monitoring for potential decompensation, obtaining history from family, EMT, nursing home staff and/or treating physicians; directing the titration of pressors, oxygen support devices, fluid resuscitation, interpretation of cardiac output measurements, interpretation of radiological assessments, interpretation of EKG / rhythm strips, telemetry.  This time did not overlap with the management of other patients or performing separately reportable procedures.      Management of this patient was discussed with the CT surgery attending and mid-level providers.    I acknowledge the use of copied documentation.  All copy forward documentation is my own and has been reviewed and revised as appropriate.  If no changes were made, it is because that information remains the same.

## 2025-04-09 NOTE — DIETITIAN INITIAL EVALUATION ADULT - PERTINENT MEDS FT
MEDICATIONS  (STANDING):  albumin human  5% IVPB 3000 milliLiter(s) IV Intermittent once  albuterol/ipratropium for Nebulization 3 milliLiter(s) Nebulizer every 6 hours  ascorbic acid 500 milliGRAM(s) Oral daily  aspirin enteric coated 325 milliGRAM(s) Oral daily  atorvastatin 40 milliGRAM(s) Oral at bedtime  bisacodyl 5 milliGRAM(s) Oral every 12 hours  chlorhexidine 2% Cloths 1 Application(s) Topical daily  clopidogrel Tablet 75 milliGRAM(s) Oral daily  dextrose 5%. 1000 milliLiter(s) (50 mL/Hr) IV Continuous <Continuous>  dextrose 5%. 1000 milliLiter(s) (100 mL/Hr) IV Continuous <Continuous>  dextrose 50% Injectable 50 milliLiter(s) IV Push every 15 minutes  enoxaparin Injectable 40 milliGRAM(s) SubCutaneous every 24 hours  famotidine    Tablet 20 milliGRAM(s) Oral two times a day  ferrous    sulfate 325 milliGRAM(s) Oral daily  folic acid 1 milliGRAM(s) Oral daily  furosemide   Injectable 20 milliGRAM(s) IV Push every 12 hours  glucagon  Injectable 1 milliGRAM(s) IntraMuscular once  guaiFENesin  milliGRAM(s) Oral every 12 hours  insulin lispro (ADMELOG) corrective regimen sliding scale   SubCutaneous three times a day before meals  insulin lispro (ADMELOG) corrective regimen sliding scale   SubCutaneous at bedtime  lactulose Syrup 20 Gram(s) Oral every 6 hours  lidocaine   4% Patch 1 Patch Transdermal every 24 hours  metoprolol tartrate 12.5 milliGRAM(s) Oral every 12 hours  multivitamin/minerals 1 Tablet(s) Oral daily  piperacillin/tazobactam IVPB.. 3.375 Gram(s) IV Intermittent every 8 hours  polyethylene glycol 3350 17 Gram(s) Oral daily  senna 1 Tablet(s) Oral at bedtime  sodium chloride 3%  Inhalation 4 milliLiter(s) Inhalation every 12 hours    MEDICATIONS  (PRN):  dextrose Oral Gel 15 Gram(s) Oral once PRN Blood Glucose LESS THAN 70 milliGRAM(s)/deciliter  ibuprofen  Tablet. 400 milliGRAM(s) Oral every 8 hours PRN Temp greater or equal to 38C (100.4F)  ketorolac   Injectable 15 milliGRAM(s) IV Push once PRN Moderate Pain (4 - 6)  melatonin 5 milliGRAM(s) Oral at bedtime PRN Insomnia  oxyCODONE    IR 5 milliGRAM(s) Oral every 4 hours PRN Moderate Pain (4 - 6)  oxyCODONE    IR 10 milliGRAM(s) Oral every 4 hours PRN Severe Pain (7 - 10)

## 2025-04-09 NOTE — PROGRESS NOTE ADULT - SUBJECTIVE AND OBJECTIVE BOX
OPERATIVE PROCEDURE(s): CABGx4              POD # 5                       SURGEON(s): MICHELLE Young      SUBJECTIVE ASSESSMENT:55yMale patient seen and examined at bedside.    Vital Signs Last 24 Hrs  T(F): 98.2 (09 Apr 2025 08:00), Max: 99.4 (08 Apr 2025 13:00)  HR: 81 (09 Apr 2025 08:00) (76 - 99)  BP: 147/79 (09 Apr 2025 08:00) (102/62 - 155/87)  BP(mean): 97 (09 Apr 2025 08:00) (77 - 121)  RR: 36 (09 Apr 2025 08:00) (16 - 39)  SpO2: 95% (09 Apr 2025 08:00) (87% - 100%)      I&O's Detail    08 Apr 2025 07:01  -  09 Apr 2025 07:00  --------------------------------------------------------  IN:    IV PiggyBack: 200 mL    IV PiggyBack: 100 mL    Oral Fluid: 1260 mL  Total IN: 1560 mL    OUT:    Voided (mL): 1750 mL  Total OUT: 1750 mL        Net: I&O's Detail    07 Apr 2025 07:01  -  08 Apr 2025 07:00  --------------------------------------------------------  Total NET: -950 mL      08 Apr 2025 07:01  -  09 Apr 2025 07:00  --------------------------------------------------------  Total NET: -190 mL        CAPILLARY BLOOD GLUCOSE      POCT Blood Glucose.: 136 mg/dL (09 Apr 2025 06:59)  POCT Blood Glucose.: 146 mg/dL (08 Apr 2025 16:41)  POCT Blood Glucose.: 126 mg/dL (08 Apr 2025 11:39)    A1C with Estimated Average Glucose Result: 5.6 % (04-02-25 @ 14:00)      Physical Exam:  General: NAD; A&Ox3  Neuro: pupils equal and reactive, speech clear, no overt sensory or motor deficts  Cardiac: S1/S2, RRR, no murmur, no rubs  Lungs: unlabored respirations, CTA b/l, no wheeze, no rales, no crackles  Abdomen: Soft/NT/ND; positive bowel sounds x 4  Sternum: Intact, no click, incision healing well with no drainage  Incisions: Incisions clean/dry/intact  Extremities: No edema b/l lower extremities; good capillary refill; no cyanosis; palpable 1+ pedal pulses b/l    Central Venous Catheter: Yes: critical illness, intravenous access  Day #  Rivero Catheter: Yes: critical illness; monitor strict i/o's                    Day #  EPICARDIAL WIRES:  YES                                                                         Day #  BOWEL MOVEMENT:  [] YES [] NO, If No, Timing since last BM Day #  CHEST TUBE(MS/Left/Right):  [] YES [] NO, If yes -  AIR LEAKS:  YES/NO        LABS:                        7.6[L]  7.81  )-----------( 225      ( 09 Apr 2025 02:43 )             22.1[L]                        8.5[L]  8.01  )-----------( 198      ( 08 Apr 2025 02:39 )             24.7[L]    04-09    132[L]  |  99  |  24[H]  ----------------------------<  125[H]  4.4   |  24  |  0.9  04-08    137  |  102  |  26[H]  ----------------------------<  127[H]  4.3   |  24  |  1.1    Ca    8.1[L]      09 Apr 2025 02:43  Mg     2.1     04-09    TPro  5.2[L] [6.0 - 8.0]  /  Alb  3.2[L] [3.5 - 5.2]  /  TBili  0.4 [0.2 - 1.2]  /  DBili  x   /  AST  57[H] [0 - 41]  /  ALT  66[H] [0 - 41]  /  AlkPhos  141[H] [30 - 115]  04-09      Urinalysis Basic - ( 09 Apr 2025 02:43 )    Color: x / Appearance: x / SG: x / pH: x  Gluc: 125 mg/dL / Ketone: x  / Bili: x / Urobili: x   Blood: x / Protein: x / Nitrite: x   Leuk Esterase: x / RBC: x / WBC x   Sq Epi: x / Non Sq Epi: x / Bacteria: x        RADIOLOGY & ADDITIONAL TESTS:  CXR:   EKG:    Allergies    No Known Allergies    Intolerances      MEDICATIONS  (STANDING):  albumin human  5% IVPB 3000 milliLiter(s) IV Intermittent once  albuterol/ipratropium for Nebulization 3 milliLiter(s) Nebulizer every 6 hours  ascorbic acid 500 milliGRAM(s) Oral daily  aspirin enteric coated 325 milliGRAM(s) Oral daily  atorvastatin 40 milliGRAM(s) Oral at bedtime  bisacodyl 5 milliGRAM(s) Oral every 12 hours  chlorhexidine 2% Cloths 1 Application(s) Topical daily  clopidogrel Tablet 75 milliGRAM(s) Oral daily  dextrose 5%. 1000 milliLiter(s) (50 mL/Hr) IV Continuous <Continuous>  dextrose 5%. 1000 milliLiter(s) (100 mL/Hr) IV Continuous <Continuous>  dextrose 50% Injectable 50 milliLiter(s) IV Push every 15 minutes  enoxaparin Injectable 40 milliGRAM(s) SubCutaneous every 24 hours  famotidine    Tablet 20 milliGRAM(s) Oral two times a day  ferrous    sulfate 325 milliGRAM(s) Oral daily  folic acid 1 milliGRAM(s) Oral daily  furosemide   Injectable 20 milliGRAM(s) IV Push every 12 hours  glucagon  Injectable 1 milliGRAM(s) IntraMuscular once  guaiFENesin  milliGRAM(s) Oral every 12 hours  insulin lispro (ADMELOG) corrective regimen sliding scale   SubCutaneous three times a day before meals  insulin lispro (ADMELOG) corrective regimen sliding scale   SubCutaneous at bedtime  lidocaine   4% Patch 1 Patch Transdermal every 24 hours  metoprolol tartrate 12.5 milliGRAM(s) Oral every 12 hours  multivitamin/minerals 1 Tablet(s) Oral daily  piperacillin/tazobactam IVPB.. 3.375 Gram(s) IV Intermittent every 8 hours  polyethylene glycol 3350 17 Gram(s) Oral daily  potassium chloride    Tablet ER 20 milliEquivalent(s) Oral daily  senna 1 Tablet(s) Oral at bedtime  sodium chloride 3%  Inhalation 4 milliLiter(s) Inhalation every 12 hours    MEDICATIONS  (PRN):  dextrose Oral Gel 15 Gram(s) Oral once PRN Blood Glucose LESS THAN 70 milliGRAM(s)/deciliter  ibuprofen  Tablet. 400 milliGRAM(s) Oral every 8 hours PRN Temp greater or equal to 38C (100.4F)  melatonin 5 milliGRAM(s) Oral at bedtime PRN Insomnia  oxyCODONE    IR 5 milliGRAM(s) Oral every 4 hours PRN Moderate Pain (4 - 6)  oxyCODONE    IR 10 milliGRAM(s) Oral every 4 hours PRN Severe Pain (7 - 10)    Home Medications:  lisinopril 40 mg oral tablet: 1 tab(s) orally once a day PATIENT NON COMPLIANT WITH MEDICATION (02 Apr 2025 09:30)      Pharmacologic DVT Prophylaxis [] YES, [] NO: Contraindication:  SCD's: YES b/l    GI Prophylaxis:     Post-Op Beta-Blockers: [] Yes, [] No: contraindication:  Post-Op CCB: [] Yes, [] No: contraindication:  Post-Op Aspirin:  [] Yes, [] No: contraindication:  Post-Op Statin:  [] Yes, [] No: contraindication:    Ambulation/Activity Status:    Assessment/Plan:  55y Male status-post  - Case and plan discussed with CTU Intensivist and CT Surgeon - Dr. Young/Hank/Efe/Danay  - Continue CTU supportive care and ongoing plan of care as per continuing CTU rounds.   - Continue DVT/GI prophylaxis  - Incentive Spirometry 10 times an hour  - Continue to advance physical activity as tolerated and continue PT/OT as directed  1. CAD s/p CABG: Continue ASA, statin, BB  2. HTN:   3. Post-op A. Fib ppx:   4. COPD/Hypoxia:   5. DM/Glucose Control:     Social Service Disposition:     OPERATIVE PROCEDURE(s): CABGx4              POD # 5                       SURGEON(s): MICHELLE Young      SUBJECTIVE ASSESSMENT:55yMale patient seen and examined at bedside. No complaints at this time.     Vital Signs Last 24 Hrs  T(F): 98.2 (09 Apr 2025 08:00), Max: 99.4 (08 Apr 2025 13:00)  HR: 81 (09 Apr 2025 08:00) (76 - 99)  BP: 147/79 (09 Apr 2025 08:00) (102/62 - 155/87)  BP(mean): 97 (09 Apr 2025 08:00) (77 - 121)  RR: 36 (09 Apr 2025 08:00) (16 - 39)  SpO2: 95% (09 Apr 2025 08:00) (87% - 100%)      I&O's Detail    08 Apr 2025 07:01  -  09 Apr 2025 07:00  --------------------------------------------------------  IN:    IV PiggyBack: 200 mL    IV PiggyBack: 100 mL    Oral Fluid: 1260 mL  Total IN: 1560 mL    OUT:    Voided (mL): 1750 mL  Total OUT: 1750 mL        Net: I&O's Detail    07 Apr 2025 07:01  -  08 Apr 2025 07:00  --------------------------------------------------------  Total NET: -950 mL      08 Apr 2025 07:01  -  09 Apr 2025 07:00  --------------------------------------------------------  Total NET: -190 mL        CAPILLARY BLOOD GLUCOSE      POCT Blood Glucose.: 136 mg/dL (09 Apr 2025 06:59)  POCT Blood Glucose.: 146 mg/dL (08 Apr 2025 16:41)  POCT Blood Glucose.: 126 mg/dL (08 Apr 2025 11:39)    A1C with Estimated Average Glucose Result: 5.6 % (04-02-25 @ 14:00)      Physical Exam:  General: NAD; A&Ox3  Neuro: pupils equal and reactive, speech clear, no overt sensory or motor deficts  Cardiac: S1/S2, RRR, no murmur, no rubs  Lungs: unlabored respirations, CTA b/l, no wheeze, no rales, no crackles  Abdomen: Soft/NT/ND; positive bowel sounds x 4  Sternum: Intact, no click, incision healing well with no drainage  Incisions: Incisions clean/dry/intact  Extremities: No edema b/l lower extremities; good capillary refill; no cyanosis; palpable 1+ pedal pulses b/l    Central Venous Catheter: Yes: critical illness, intravenous access  Day #  Rivero Catheter: Yes: critical illness; monitor strict i/o's                    Day #  EPICARDIAL WIRES:  YES                                                                         Day #  BOWEL MOVEMENT:  [] YES [] NO, If No, Timing since last BM Day #  CHEST TUBE(MS/Left/Right):  [] YES [] NO, If yes -  AIR LEAKS:  YES/NO        LABS:                        7.6[L]  7.81  )-----------( 225      ( 09 Apr 2025 02:43 )             22.1[L]                        8.5[L]  8.01  )-----------( 198      ( 08 Apr 2025 02:39 )             24.7[L]    04-09    132[L]  |  99  |  24[H]  ----------------------------<  125[H]  4.4   |  24  |  0.9  04-08    137  |  102  |  26[H]  ----------------------------<  127[H]  4.3   |  24  |  1.1    Ca    8.1[L]      09 Apr 2025 02:43  Mg     2.1     04-09    TPro  5.2[L] [6.0 - 8.0]  /  Alb  3.2[L] [3.5 - 5.2]  /  TBili  0.4 [0.2 - 1.2]  /  DBili  x   /  AST  57[H] [0 - 41]  /  ALT  66[H] [0 - 41]  /  AlkPhos  141[H] [30 - 115]  04-09      Urinalysis Basic - ( 09 Apr 2025 02:43 )    Color: x / Appearance: x / SG: x / pH: x  Gluc: 125 mg/dL / Ketone: x  / Bili: x / Urobili: x   Blood: x / Protein: x / Nitrite: x   Leuk Esterase: x / RBC: x / WBC x   Sq Epi: x / Non Sq Epi: x / Bacteria: x        RADIOLOGY & ADDITIONAL TESTS:  CXR:   EKG:    Allergies    No Known Allergies    Intolerances      MEDICATIONS  (STANDING):  albumin human  5% IVPB 3000 milliLiter(s) IV Intermittent once  albuterol/ipratropium for Nebulization 3 milliLiter(s) Nebulizer every 6 hours  ascorbic acid 500 milliGRAM(s) Oral daily  aspirin enteric coated 325 milliGRAM(s) Oral daily  atorvastatin 40 milliGRAM(s) Oral at bedtime  bisacodyl 5 milliGRAM(s) Oral every 12 hours  chlorhexidine 2% Cloths 1 Application(s) Topical daily  clopidogrel Tablet 75 milliGRAM(s) Oral daily  dextrose 5%. 1000 milliLiter(s) (50 mL/Hr) IV Continuous <Continuous>  dextrose 5%. 1000 milliLiter(s) (100 mL/Hr) IV Continuous <Continuous>  dextrose 50% Injectable 50 milliLiter(s) IV Push every 15 minutes  enoxaparin Injectable 40 milliGRAM(s) SubCutaneous every 24 hours  famotidine    Tablet 20 milliGRAM(s) Oral two times a day  ferrous    sulfate 325 milliGRAM(s) Oral daily  folic acid 1 milliGRAM(s) Oral daily  furosemide   Injectable 20 milliGRAM(s) IV Push every 12 hours  glucagon  Injectable 1 milliGRAM(s) IntraMuscular once  guaiFENesin  milliGRAM(s) Oral every 12 hours  insulin lispro (ADMELOG) corrective regimen sliding scale   SubCutaneous three times a day before meals  insulin lispro (ADMELOG) corrective regimen sliding scale   SubCutaneous at bedtime  lidocaine   4% Patch 1 Patch Transdermal every 24 hours  metoprolol tartrate 12.5 milliGRAM(s) Oral every 12 hours  multivitamin/minerals 1 Tablet(s) Oral daily  piperacillin/tazobactam IVPB.. 3.375 Gram(s) IV Intermittent every 8 hours  polyethylene glycol 3350 17 Gram(s) Oral daily  potassium chloride    Tablet ER 20 milliEquivalent(s) Oral daily  senna 1 Tablet(s) Oral at bedtime  sodium chloride 3%  Inhalation 4 milliLiter(s) Inhalation every 12 hours    MEDICATIONS  (PRN):  dextrose Oral Gel 15 Gram(s) Oral once PRN Blood Glucose LESS THAN 70 milliGRAM(s)/deciliter  ibuprofen  Tablet. 400 milliGRAM(s) Oral every 8 hours PRN Temp greater or equal to 38C (100.4F)  melatonin 5 milliGRAM(s) Oral at bedtime PRN Insomnia  oxyCODONE    IR 5 milliGRAM(s) Oral every 4 hours PRN Moderate Pain (4 - 6)  oxyCODONE    IR 10 milliGRAM(s) Oral every 4 hours PRN Severe Pain (7 - 10)    Home Medications:  lisinopril 40 mg oral tablet: 1 tab(s) orally once a day PATIENT NON COMPLIANT WITH MEDICATION (02 Apr 2025 09:30)      Pharmacologic DVT Prophylaxis [] YES, [] NO: Contraindication:  SCD's: YES b/l    GI Prophylaxis:     Post-Op Beta-Blockers: [] Yes, [] No: contraindication:  Post-Op CCB: [] Yes, [] No: contraindication:  Post-Op Aspirin:  [] Yes, [] No: contraindication:  Post-Op Statin:  [] Yes, [] No: contraindication:    Ambulation/Activity Status:    Assessment/Plan:  55y Male status-post  - Case and plan discussed with CTU Intensivist and CT Surgeon - Dr. Young/Hank/Efe/Danay  - Continue CTU supportive care and ongoing plan of care as per continuing CTU rounds.   - Continue DVT/GI prophylaxis  - Incentive Spirometry 10 times an hour  - Continue to advance physical activity as tolerated and continue PT/OT as directed  1. CAD s/p CABG: Continue ASA, statin, BB  2. HTN:   3. Post-op A. Fib ppx:   4. COPD/Hypoxia:   5. DM/Glucose Control:     Social Service Disposition:     OPERATIVE PROCEDURE(s): CABGx4              POD # 5                       SURGEON(s): MICHELLE Young      SUBJECTIVE ASSESSMENT: 55y Male patient seen and examined at bedside. No complaints at this time.     Vital Signs Last 24 Hrs  T(F): 98.2 (09 Apr 2025 08:00), Max: 99.4 (08 Apr 2025 13:00)  HR: 81 (09 Apr 2025 08:00) (76 - 99)  BP: 147/79 (09 Apr 2025 08:00) (102/62 - 155/87)  BP(mean): 97 (09 Apr 2025 08:00) (77 - 121)  RR: 36 (09 Apr 2025 08:00) (16 - 39)  SpO2: 95% (09 Apr 2025 08:00) (87% - 100%)      I&O's Detail    08 Apr 2025 07:01  -  09 Apr 2025 07:00  --------------------------------------------------------  IN:    IV PiggyBack: 200 mL    IV PiggyBack: 100 mL    Oral Fluid: 1260 mL  Total IN: 1560 mL    OUT:    Voided (mL): 1750 mL  Total OUT: 1750 mL        Net: I&O's Detail    07 Apr 2025 07:01  -  08 Apr 2025 07:00  --------------------------------------------------------  Total NET: -950 mL      08 Apr 2025 07:01  -  09 Apr 2025 07:00  --------------------------------------------------------  Total NET: -190 mL        CAPILLARY BLOOD GLUCOSE      POCT Blood Glucose.: 136 mg/dL (09 Apr 2025 06:59)  POCT Blood Glucose.: 146 mg/dL (08 Apr 2025 16:41)  POCT Blood Glucose.: 126 mg/dL (08 Apr 2025 11:39)    A1C with Estimated Average Glucose Result: 5.6 % (04-02-25 @ 14:00)      Physical Exam:  General: NAD; A&Ox3  Neuro: pupils equal and reactive, speech clear, no overt sensory or motor deficts  Cardiac: S1/S2, RRR, no murmur, no rubs  Lungs: unlabored respirations, CTA b/l, no wheeze, no rales, no crackles  Abdomen: Soft/NT/ND; positive bowel sounds x 4  Sternum: Intact, no click, incision healing well with no drainage  Incisions: Incisions clean/dry/intact  Extremities: No edema b/l lower extremities; good capillary refill; no cyanosis; palpable 1+ pedal pulses b/l    Central Venous Catheter:  [] YES [X] NO      Day #  Rivero Catheter:  [] YES [X] NO                    Day #  EPICARDIAL WIRES:  [] YES [X] NO               Day #  BOWEL MOVEMENT:  [X] YES [] NO, If No, Timing since last BM Day #  CHEST TUBE(MS/Left/Right):  [] YES [X] NO, If yes -  AIR LEAKS:  YES/NO        LABS:                        7.6[L]  7.81  )-----------( 225      ( 09 Apr 2025 02:43 )             22.1[L]                        8.5[L]  8.01  )-----------( 198      ( 08 Apr 2025 02:39 )             24.7[L]    04-09    132[L]  |  99  |  24[H]  ----------------------------<  125[H]  4.4   |  24  |  0.9  04-08    137  |  102  |  26[H]  ----------------------------<  127[H]  4.3   |  24  |  1.1    Ca    8.1[L]      09 Apr 2025 02:43  Mg     2.1     04-09    TPro  5.2[L] [6.0 - 8.0]  /  Alb  3.2[L] [3.5 - 5.2]  /  TBili  0.4 [0.2 - 1.2]  /  DBili  x   /  AST  57[H] [0 - 41]  /  ALT  66[H] [0 - 41]  /  AlkPhos  141[H] [30 - 115]  04-09      Urinalysis Basic - ( 09 Apr 2025 02:43 )    Color: x / Appearance: x / SG: x / pH: x  Gluc: 125 mg/dL / Ketone: x  / Bili: x / Urobili: x   Blood: x / Protein: x / Nitrite: x   Leuk Esterase: x / RBC: x / WBC x   Sq Epi: x / Non Sq Epi: x / Bacteria: x        RADIOLOGY & ADDITIONAL TESTS:  CXR: < from: Xray Chest 2 Views PA/Lat (04.09.25 @ 08:36) >  Support devices: None.    Cardiac/mediastinum/hilum: Stable.    Lung parenchyma/Pleura: Unchanged bibasilar opacities/effusions, right   greater than left. No pneumothorax.    Skeleton/soft tissues: Stable.    < end of copied text >    EKG: < from: 12 Lead ECG (04.08.25 @ 07:15) >  Ventricular Rate 70 BPM    Atrial Rate 70 BPM    P-R Interval 124 ms    QRS Duration 90 ms    Q-T Interval 402 ms    QTC Calculation(Bazett) 434 ms    P Axis 24 degrees    R Axis 13 degrees    T Axis 14 degrees    Diagnosis Line Normal sinus rhythm  Normal ECG    < end of copied text >      Allergies    No Known Allergies    Intolerances      MEDICATIONS  (STANDING):  albumin human  5% IVPB 3000 milliLiter(s) IV Intermittent once  albuterol/ipratropium for Nebulization 3 milliLiter(s) Nebulizer every 6 hours  ascorbic acid 500 milliGRAM(s) Oral daily  aspirin enteric coated 325 milliGRAM(s) Oral daily  atorvastatin 40 milliGRAM(s) Oral at bedtime  bisacodyl 5 milliGRAM(s) Oral every 12 hours  chlorhexidine 2% Cloths 1 Application(s) Topical daily  clopidogrel Tablet 75 milliGRAM(s) Oral daily  dextrose 5%. 1000 milliLiter(s) (50 mL/Hr) IV Continuous <Continuous>  dextrose 5%. 1000 milliLiter(s) (100 mL/Hr) IV Continuous <Continuous>  dextrose 50% Injectable 50 milliLiter(s) IV Push every 15 minutes  enoxaparin Injectable 40 milliGRAM(s) SubCutaneous every 24 hours  famotidine    Tablet 20 milliGRAM(s) Oral two times a day  ferrous    sulfate 325 milliGRAM(s) Oral daily  folic acid 1 milliGRAM(s) Oral daily  furosemide   Injectable 20 milliGRAM(s) IV Push every 12 hours  glucagon  Injectable 1 milliGRAM(s) IntraMuscular once  guaiFENesin  milliGRAM(s) Oral every 12 hours  insulin lispro (ADMELOG) corrective regimen sliding scale   SubCutaneous three times a day before meals  insulin lispro (ADMELOG) corrective regimen sliding scale   SubCutaneous at bedtime  lidocaine   4% Patch 1 Patch Transdermal every 24 hours  metoprolol tartrate 12.5 milliGRAM(s) Oral every 12 hours  multivitamin/minerals 1 Tablet(s) Oral daily  piperacillin/tazobactam IVPB.. 3.375 Gram(s) IV Intermittent every 8 hours  polyethylene glycol 3350 17 Gram(s) Oral daily  potassium chloride    Tablet ER 20 milliEquivalent(s) Oral daily  senna 1 Tablet(s) Oral at bedtime  sodium chloride 3%  Inhalation 4 milliLiter(s) Inhalation every 12 hours    MEDICATIONS  (PRN):  dextrose Oral Gel 15 Gram(s) Oral once PRN Blood Glucose LESS THAN 70 milliGRAM(s)/deciliter  ibuprofen  Tablet. 400 milliGRAM(s) Oral every 8 hours PRN Temp greater or equal to 38C (100.4F)  melatonin 5 milliGRAM(s) Oral at bedtime PRN Insomnia  oxyCODONE    IR 5 milliGRAM(s) Oral every 4 hours PRN Moderate Pain (4 - 6)  oxyCODONE    IR 10 milliGRAM(s) Oral every 4 hours PRN Severe Pain (7 - 10)    Home Medications:  lisinopril 40 mg oral tablet: 1 tab(s) orally once a day PATIENT NON COMPLIANT WITH MEDICATION (02 Apr 2025 09:30)      Pharmacologic DVT Prophylaxis [X] YES: Lovenox 40mg q24h, [] NO:   SCD's: YES b/l    GI Prophylaxis: [X] Yes, [] No    Post-Op Beta-Blockers: [X] Yes, [] No: contraindication:  Post-Op CCB: [] Yes, [X] No: contraindication:  Post-Op Aspirin:  [X] Yes, [] No: contraindication:  Post-Op Statin:  [X] Yes, [] No: contraindication:    Ambulation/Activity Status: Ambulate    Assessment/Plan:  55y Male status-post  - Case and plan discussed with CTU Intensivist and CT Surgeon - Dr. Young/Hank/Efe/Danay  - Continue CTU supportive care and ongoing plan of care as per continuing CTU rounds.   - Continue DVT/GI prophylaxis  - Incentive Spirometry 10 times an hour  - Continue to advance physical activity as tolerated and continue PT/OT as directed  1. CAD: Continue ASA, statin, BB, plavix, pain control prn  2. HTN: cont to monitor, cont bb   3. A. Fib prophylaxis: cont to monitor electrolytes, cont bb   4. Post-op Hypoxia secondary to acute pulmonary insufficiency: cont nebs, wean o2 as tolerated, encourage incentive spirometry, lasix for noncardiogenic fluid overload    5. DM/Glucose Control: cont insulin sliding scale   6. fever: wbc normal, send UA, blood cx and start abx       Social Service Disposition:  home   OPERATIVE PROCEDURE(s): CABGx4              POD # 5                       SURGEON(s): MICHELLE Young      SUBJECTIVE ASSESSMENT: 55y Male patient seen and examined at bedside. No complaints at this time.     Vital Signs Last 24 Hrs  T(F): 98.2 (09 Apr 2025 08:00), Max: 99.4 (08 Apr 2025 13:00)  HR: 81 (09 Apr 2025 08:00) (76 - 99)  BP: 147/79 (09 Apr 2025 08:00) (102/62 - 155/87)  BP(mean): 97 (09 Apr 2025 08:00) (77 - 121)  RR: 36 (09 Apr 2025 08:00) (16 - 39)  SpO2: 95% (09 Apr 2025 08:00) (87% - 100%)      I&O's Detail    08 Apr 2025 07:01  -  09 Apr 2025 07:00  --------------------------------------------------------  IN:    IV PiggyBack: 200 mL    IV PiggyBack: 100 mL    Oral Fluid: 1260 mL  Total IN: 1560 mL    OUT:    Voided (mL): 1750 mL  Total OUT: 1750 mL        Net: I&O's Detail    07 Apr 2025 07:01  -  08 Apr 2025 07:00  --------------------------------------------------------  Total NET: -950 mL      08 Apr 2025 07:01  -  09 Apr 2025 07:00  --------------------------------------------------------  Total NET: -190 mL        CAPILLARY BLOOD GLUCOSE      POCT Blood Glucose.: 136 mg/dL (09 Apr 2025 06:59)  POCT Blood Glucose.: 146 mg/dL (08 Apr 2025 16:41)  POCT Blood Glucose.: 126 mg/dL (08 Apr 2025 11:39)    A1C with Estimated Average Glucose Result: 5.6 % (04-02-25 @ 14:00)      Physical Exam:  General: NAD; A&Ox3  Neuro: pupils equal and reactive, speech clear, no overt sensory or motor deficts  Cardiac: S1/S2, RRR, no murmur, no rubs  Lungs: unlabored respirations, CTA b/l, no wheeze, no rales, no crackles  Abdomen: Soft/NT/ND; positive bowel sounds x 4  Sternum: Intact, no click, incision healing well with no drainage  Incisions: Incisions clean/dry/intact  Extremities: No edema b/l lower extremities; good capillary refill; no cyanosis; palpable 1+ pedal pulses b/l    Central Venous Catheter:  [] YES [X] NO      Day #  Rivero Catheter:  [] YES [X] NO                    Day #  EPICARDIAL WIRES:  [] YES [X] NO               Day #  BOWEL MOVEMENT:  [X] YES [] NO, If No, Timing since last BM Day #  CHEST TUBE(MS/Left/Right):  [] YES [X] NO, If yes -  AIR LEAKS:  YES/NO        LABS:                        7.6[L]  7.81  )-----------( 225      ( 09 Apr 2025 02:43 )             22.1[L]                        8.5[L]  8.01  )-----------( 198      ( 08 Apr 2025 02:39 )             24.7[L]    04-09    132[L]  |  99  |  24[H]  ----------------------------<  125[H]  4.4   |  24  |  0.9  04-08    137  |  102  |  26[H]  ----------------------------<  127[H]  4.3   |  24  |  1.1    Ca    8.1[L]      09 Apr 2025 02:43  Mg     2.1     04-09    TPro  5.2[L] [6.0 - 8.0]  /  Alb  3.2[L] [3.5 - 5.2]  /  TBili  0.4 [0.2 - 1.2]  /  DBili  x   /  AST  57[H] [0 - 41]  /  ALT  66[H] [0 - 41]  /  AlkPhos  141[H] [30 - 115]  04-09      Urinalysis Basic - ( 09 Apr 2025 02:43 )    Color: x / Appearance: x / SG: x / pH: x  Gluc: 125 mg/dL / Ketone: x  / Bili: x / Urobili: x   Blood: x / Protein: x / Nitrite: x   Leuk Esterase: x / RBC: x / WBC x   Sq Epi: x / Non Sq Epi: x / Bacteria: x        RADIOLOGY & ADDITIONAL TESTS:  CXR: < from: Xray Chest 2 Views PA/Lat (04.09.25 @ 08:36) >  Support devices: None.    Cardiac/mediastinum/hilum: Stable.    Lung parenchyma/Pleura: Unchanged bibasilar opacities/effusions, right   greater than left. No pneumothorax.    Skeleton/soft tissues: Stable.    < end of copied text >    EKG: < from: 12 Lead ECG (04.08.25 @ 07:15) >  Ventricular Rate 70 BPM    Atrial Rate 70 BPM    P-R Interval 124 ms    QRS Duration 90 ms    Q-T Interval 402 ms    QTC Calculation(Bazett) 434 ms    P Axis 24 degrees    R Axis 13 degrees    T Axis 14 degrees    Diagnosis Line Normal sinus rhythm  Normal ECG    < end of copied text >      Allergies    No Known Allergies    Intolerances      MEDICATIONS  (STANDING):  albumin human  5% IVPB 3000 milliLiter(s) IV Intermittent once  albuterol/ipratropium for Nebulization 3 milliLiter(s) Nebulizer every 6 hours  ascorbic acid 500 milliGRAM(s) Oral daily  aspirin enteric coated 325 milliGRAM(s) Oral daily  atorvastatin 40 milliGRAM(s) Oral at bedtime  bisacodyl 5 milliGRAM(s) Oral every 12 hours  chlorhexidine 2% Cloths 1 Application(s) Topical daily  clopidogrel Tablet 75 milliGRAM(s) Oral daily  dextrose 5%. 1000 milliLiter(s) (50 mL/Hr) IV Continuous <Continuous>  dextrose 5%. 1000 milliLiter(s) (100 mL/Hr) IV Continuous <Continuous>  dextrose 50% Injectable 50 milliLiter(s) IV Push every 15 minutes  enoxaparin Injectable 40 milliGRAM(s) SubCutaneous every 24 hours  famotidine    Tablet 20 milliGRAM(s) Oral two times a day  ferrous    sulfate 325 milliGRAM(s) Oral daily  folic acid 1 milliGRAM(s) Oral daily  furosemide   Injectable 20 milliGRAM(s) IV Push every 12 hours  glucagon  Injectable 1 milliGRAM(s) IntraMuscular once  guaiFENesin  milliGRAM(s) Oral every 12 hours  insulin lispro (ADMELOG) corrective regimen sliding scale   SubCutaneous three times a day before meals  insulin lispro (ADMELOG) corrective regimen sliding scale   SubCutaneous at bedtime  lidocaine   4% Patch 1 Patch Transdermal every 24 hours  metoprolol tartrate 12.5 milliGRAM(s) Oral every 12 hours  multivitamin/minerals 1 Tablet(s) Oral daily  piperacillin/tazobactam IVPB.. 3.375 Gram(s) IV Intermittent every 8 hours  polyethylene glycol 3350 17 Gram(s) Oral daily  potassium chloride    Tablet ER 20 milliEquivalent(s) Oral daily  senna 1 Tablet(s) Oral at bedtime  sodium chloride 3%  Inhalation 4 milliLiter(s) Inhalation every 12 hours    MEDICATIONS  (PRN):  dextrose Oral Gel 15 Gram(s) Oral once PRN Blood Glucose LESS THAN 70 milliGRAM(s)/deciliter  ibuprofen  Tablet. 400 milliGRAM(s) Oral every 8 hours PRN Temp greater or equal to 38C (100.4F)  melatonin 5 milliGRAM(s) Oral at bedtime PRN Insomnia  oxyCODONE    IR 5 milliGRAM(s) Oral every 4 hours PRN Moderate Pain (4 - 6)  oxyCODONE    IR 10 milliGRAM(s) Oral every 4 hours PRN Severe Pain (7 - 10)    Home Medications:  lisinopril 40 mg oral tablet: 1 tab(s) orally once a day PATIENT NON COMPLIANT WITH MEDICATION (02 Apr 2025 09:30)      Pharmacologic DVT Prophylaxis [X] YES: Lovenox 40mg q24h, [] NO:   SCD's: YES b/l    GI Prophylaxis: [X] Yes, [] No    Post-Op Beta-Blockers: [X] Yes, [] No: contraindication:  Post-Op CCB: [] Yes, [X] No: contraindication:  Post-Op Aspirin:  [X] Yes, [] No: contraindication:  Post-Op Statin:  [X] Yes, [] No: contraindication:    Ambulation/Activity Status: Ambulate    Assessment/Plan:  55y Male status-post  - Case and plan discussed with CTU Intensivist and CT Surgeon - Dr. Young/Hank/Efe/Danay  - Continue CTU supportive care and ongoing plan of care as per continuing CTU rounds.   - Continue DVT/GI prophylaxis  - Incentive Spirometry 10 times an hour  - Continue to advance physical activity as tolerated and continue PT/OT as directed  1. CAD: Continue ASA, statin, BB, plavix, pain control prn  2. HTN: cont to monitor, cont bb   3. A. Fib prophylaxis: cont to monitor electrolytes, cont bb   4. Post-op Hypoxia secondary to acute pulmonary insufficiency: cont nebs, wean o2 as tolerated, encourage incentive spirometry, lasix for noncardiogenic fluid overload    5. DM/Glucose Control: cont insulin sliding scale       Social Service Disposition:  home   OPERATIVE PROCEDURE(s): CABGx4              POD # 5                       SURGEON(s): MICHELLE Young      SUBJECTIVE ASSESSMENT:55yMale patient seen and examined at bedside. No complaints at this time.     Vital Signs Last 24 Hrs  T(F): 98.2 (09 Apr 2025 08:00), Max: 99.4 (08 Apr 2025 13:00)  HR: 81 (09 Apr 2025 08:00) (76 - 99)  BP: 147/79 (09 Apr 2025 08:00) (102/62 - 155/87)  BP(mean): 97 (09 Apr 2025 08:00) (77 - 121)  RR: 36 (09 Apr 2025 08:00) (16 - 39)  SpO2: 95% (09 Apr 2025 08:00) (87% - 100%)      I&O's Detail    08 Apr 2025 07:01  -  09 Apr 2025 07:00  --------------------------------------------------------  IN:    IV PiggyBack: 200 mL    IV PiggyBack: 100 mL    Oral Fluid: 1260 mL  Total IN: 1560 mL    OUT:    Voided (mL): 1750 mL  Total OUT: 1750 mL        Net: I&O's Detail    07 Apr 2025 07:01  -  08 Apr 2025 07:00  --------------------------------------------------------  Total NET: -950 mL      08 Apr 2025 07:01  -  09 Apr 2025 07:00  --------------------------------------------------------  Total NET: -190 mL        CAPILLARY BLOOD GLUCOSE      POCT Blood Glucose.: 136 mg/dL (09 Apr 2025 06:59)  POCT Blood Glucose.: 146 mg/dL (08 Apr 2025 16:41)  POCT Blood Glucose.: 126 mg/dL (08 Apr 2025 11:39)    A1C with Estimated Average Glucose Result: 5.6 % (04-02-25 @ 14:00)      Physical Exam:  General: NAD; A&Ox3  Neuro: pupils equal and reactive, speech clear, no overt sensory or motor deficts  Cardiac: S1/S2, RRR, no murmur, no rubs  Lungs: unlabored respirations, CTA b/l, no wheeze, no rales, no crackles  Abdomen: Soft/NT/ND; positive bowel sounds x 4  Sternum: Intact, no click, incision healing well with no drainage  Incisions: Incisions clean/dry/intact  Extremities: No edema b/l lower extremities; good capillary refill; no cyanosis; palpable 1+ pedal pulses b/l    BOWEL MOVEMENT:  [] YES [X] NO, If No, Timing since last BM Day #2        LABS:                        7.6[L]  7.81  )-----------( 225      ( 09 Apr 2025 02:43 )             22.1[L]                        8.5[L]  8.01  )-----------( 198      ( 08 Apr 2025 02:39 )             24.7[L]    04-09    132[L]  |  99  |  24[H]  ----------------------------<  125[H]  4.4   |  24  |  0.9  04-08    137  |  102  |  26[H]  ----------------------------<  127[H]  4.3   |  24  |  1.1    Ca    8.1[L]      09 Apr 2025 02:43  Mg     2.1     04-09    TPro  5.2[L] [6.0 - 8.0]  /  Alb  3.2[L] [3.5 - 5.2]  /  TBili  0.4 [0.2 - 1.2]  /  DBili  x   /  AST  57[H] [0 - 41]  /  ALT  66[H] [0 - 41]  /  AlkPhos  141[H] [30 - 115]  04-09      RADIOLOGY & ADDITIONAL TESTS:  CXR:  < from: Xray Chest 2 Views PA/Lat (04.09.25 @ 08:36) >    INTERPRETATION:  CLINICAL HISTORY: Post CABG    COMPARISON: Chest radiograph dated April 8, 2025.    TECHNIQUE: Frontal and lateral chestradiographs.    FINDINGS/  IMPRESSION:    Support devices: None.    Cardiac/mediastinum/hilum: Stable.    Lung parenchyma/Pleura: Unchanged bibasilar opacities/effusions, right   greater than left. No pneumothorax.    Skeleton/soft tissues: Stable.       EKG:  < from: 12 Lead ECG (04.08.25 @ 07:15) >  Ventricular Rate 70 BPM    Atrial Rate 70 BPM    P-R Interval 124 ms    QRS Duration 90 ms    Q-T Interval 402 ms    QTC Calculation(Bazett) 434 ms    P Axis 24 degrees    R Axis 13 degrees    T Axis 14 degrees    Diagnosis Line Normal sinus rhythm  Normal ECG        Allergies    No Known Allergies    Intolerances      MEDICATIONS  (STANDING):  albumin human  5% IVPB 3000 milliLiter(s) IV Intermittent once  albuterol/ipratropium for Nebulization 3 milliLiter(s) Nebulizer every 6 hours  ascorbic acid 500 milliGRAM(s) Oral daily  aspirin enteric coated 325 milliGRAM(s) Oral daily  atorvastatin 40 milliGRAM(s) Oral at bedtime  bisacodyl 5 milliGRAM(s) Oral every 12 hours  chlorhexidine 2% Cloths 1 Application(s) Topical daily  clopidogrel Tablet 75 milliGRAM(s) Oral daily  dextrose 5%. 1000 milliLiter(s) (50 mL/Hr) IV Continuous <Continuous>  dextrose 5%. 1000 milliLiter(s) (100 mL/Hr) IV Continuous <Continuous>  dextrose 50% Injectable 50 milliLiter(s) IV Push every 15 minutes  enoxaparin Injectable 40 milliGRAM(s) SubCutaneous every 24 hours  famotidine    Tablet 20 milliGRAM(s) Oral two times a day  ferrous    sulfate 325 milliGRAM(s) Oral daily  folic acid 1 milliGRAM(s) Oral daily  furosemide   Injectable 20 milliGRAM(s) IV Push every 12 hours  glucagon  Injectable 1 milliGRAM(s) IntraMuscular once  guaiFENesin  milliGRAM(s) Oral every 12 hours  insulin lispro (ADMELOG) corrective regimen sliding scale   SubCutaneous three times a day before meals  insulin lispro (ADMELOG) corrective regimen sliding scale   SubCutaneous at bedtime  lidocaine   4% Patch 1 Patch Transdermal every 24 hours  metoprolol tartrate 12.5 milliGRAM(s) Oral every 12 hours  multivitamin/minerals 1 Tablet(s) Oral daily  piperacillin/tazobactam IVPB.. 3.375 Gram(s) IV Intermittent every 8 hours  polyethylene glycol 3350 17 Gram(s) Oral daily  potassium chloride    Tablet ER 20 milliEquivalent(s) Oral daily  senna 1 Tablet(s) Oral at bedtime  sodium chloride 3%  Inhalation 4 milliLiter(s) Inhalation every 12 hours    MEDICATIONS  (PRN):  dextrose Oral Gel 15 Gram(s) Oral once PRN Blood Glucose LESS THAN 70 milliGRAM(s)/deciliter  ibuprofen  Tablet. 400 milliGRAM(s) Oral every 8 hours PRN Temp greater or equal to 38C (100.4F)  melatonin 5 milliGRAM(s) Oral at bedtime PRN Insomnia  oxyCODONE    IR 5 milliGRAM(s) Oral every 4 hours PRN Moderate Pain (4 - 6)  oxyCODONE    IR 10 milliGRAM(s) Oral every 4 hours PRN Severe Pain (7 - 10)    Home Medications:  lisinopril 40 mg oral tablet: 1 tab(s) orally once a day PATIENT NON COMPLIANT WITH MEDICATION (02 Apr 2025 09:30)      Pharmacologic DVT Prophylaxis [X] YES, [] NO: Contraindication:  SCD's: YES b/l    GI Prophylaxis: pepcid 20mg    Post-Op Beta-Blockers: [X] Yes, [] No: contraindication:  Post-Op Aspirin:  [X] Yes, [] No: contraindication:  Post-Op Statin:  [X] Yes, [] No: contraindication:    Ambulation/Activity Status: ambulate as tolerated    Assessment/Plan:  55y Male status-post CABG x4 POD #5  - Case and plan discussed with CTU Intensivist and CT Surgeon - Dr. Young/Hank/Efe  - Continue CTU supportive care and ongoing plan of care as per continuing CTU rounds.   - Continue DVT/GI prophylaxis  - Incentive Spirometry 10 times an hour  - Continue to advance physical activity as tolerated and continue PT/OT as directed  1. CAD: Continue ASA, statin, BB, plavix, pain control prn  2. HTN: cont to monitor, cont bb   3. A. Fib prophylaxis: cont to monitor electrolytes, cont bb   4. Post-op Hypoxia secondary to acute pulmonary insufficiency: cont nebs, wean o2 as tolerated, encourage incentive spirometry, lasix for noncardiogenic fluid overload. IPV started  5. DM/Glucose Control: cont insulin sliding scale   6. Fever: WBC normal, afebrile today, procalcitonin elevated, continue Zosyn    Social Service Disposition: home with home care once off O2

## 2025-04-10 ENCOUNTER — TRANSCRIPTION ENCOUNTER (OUTPATIENT)
Age: 55
End: 2025-04-10

## 2025-04-10 LAB
ALBUMIN SERPL ELPH-MCNC: 3.1 G/DL — LOW (ref 3.5–5.2)
ALBUMIN SERPL ELPH-MCNC: 3.5 G/DL — SIGNIFICANT CHANGE UP (ref 3.5–5.2)
ALP SERPL-CCNC: 195 U/L — HIGH (ref 30–115)
ALP SERPL-CCNC: 199 U/L — HIGH (ref 30–115)
ALT FLD-CCNC: 69 U/L — HIGH (ref 0–41)
ALT FLD-CCNC: 86 U/L — HIGH (ref 0–41)
ANION GAP SERPL CALC-SCNC: 12 MMOL/L — SIGNIFICANT CHANGE UP (ref 7–14)
ANION GAP SERPL CALC-SCNC: 14 MMOL/L — SIGNIFICANT CHANGE UP (ref 7–14)
AST SERPL-CCNC: 43 U/L — HIGH (ref 0–41)
AST SERPL-CCNC: 80 U/L — HIGH (ref 0–41)
BASOPHILS # BLD AUTO: 0.04 K/UL — SIGNIFICANT CHANGE UP (ref 0–0.2)
BASOPHILS NFR BLD AUTO: 0.4 % — SIGNIFICANT CHANGE UP (ref 0–1)
BILIRUB SERPL-MCNC: 0.3 MG/DL — SIGNIFICANT CHANGE UP (ref 0.2–1.2)
BILIRUB SERPL-MCNC: 0.4 MG/DL — SIGNIFICANT CHANGE UP (ref 0.2–1.2)
BUN SERPL-MCNC: 29 MG/DL — HIGH (ref 10–20)
BUN SERPL-MCNC: 32 MG/DL — HIGH (ref 10–20)
CALCIUM SERPL-MCNC: 8.2 MG/DL — LOW (ref 8.4–10.5)
CALCIUM SERPL-MCNC: 8.4 MG/DL — SIGNIFICANT CHANGE UP (ref 8.4–10.5)
CHLORIDE SERPL-SCNC: 100 MMOL/L — SIGNIFICANT CHANGE UP (ref 98–110)
CHLORIDE SERPL-SCNC: 100 MMOL/L — SIGNIFICANT CHANGE UP (ref 98–110)
CO2 SERPL-SCNC: 23 MMOL/L — SIGNIFICANT CHANGE UP (ref 17–32)
CO2 SERPL-SCNC: 24 MMOL/L — SIGNIFICANT CHANGE UP (ref 17–32)
CREAT SERPL-MCNC: 1 MG/DL — SIGNIFICANT CHANGE UP (ref 0.7–1.5)
CREAT SERPL-MCNC: 1.2 MG/DL — SIGNIFICANT CHANGE UP (ref 0.7–1.5)
EGFR: 71 ML/MIN/1.73M2 — SIGNIFICANT CHANGE UP
EGFR: 71 ML/MIN/1.73M2 — SIGNIFICANT CHANGE UP
EGFR: 88 ML/MIN/1.73M2 — SIGNIFICANT CHANGE UP
EGFR: 88 ML/MIN/1.73M2 — SIGNIFICANT CHANGE UP
EOSINOPHIL # BLD AUTO: 0.23 K/UL — SIGNIFICANT CHANGE UP (ref 0–0.7)
EOSINOPHIL NFR BLD AUTO: 2.5 % — SIGNIFICANT CHANGE UP (ref 0–8)
GLUCOSE BLDC GLUCOMTR-MCNC: 111 MG/DL — HIGH (ref 70–99)
GLUCOSE BLDC GLUCOMTR-MCNC: 137 MG/DL — HIGH (ref 70–99)
GLUCOSE SERPL-MCNC: 100 MG/DL — HIGH (ref 70–99)
GLUCOSE SERPL-MCNC: 113 MG/DL — HIGH (ref 70–99)
HCT VFR BLD CALC: 22 % — LOW (ref 42–52)
HGB BLD-MCNC: 7.3 G/DL — LOW (ref 14–18)
IMM GRANULOCYTES NFR BLD AUTO: 1 % — HIGH (ref 0.1–0.3)
LYMPHOCYTES # BLD AUTO: 1.16 K/UL — LOW (ref 1.2–3.4)
LYMPHOCYTES # BLD AUTO: 12.7 % — LOW (ref 20.5–51.1)
MAGNESIUM SERPL-MCNC: 2.1 MG/DL — SIGNIFICANT CHANGE UP (ref 1.8–2.4)
MAGNESIUM SERPL-MCNC: 2.2 MG/DL — SIGNIFICANT CHANGE UP (ref 1.8–2.4)
MCHC RBC-ENTMCNC: 30 PG — SIGNIFICANT CHANGE UP (ref 27–31)
MCHC RBC-ENTMCNC: 33.2 G/DL — SIGNIFICANT CHANGE UP (ref 32–37)
MCV RBC AUTO: 90.5 FL — SIGNIFICANT CHANGE UP (ref 80–94)
MONOCYTES # BLD AUTO: 0.98 K/UL — HIGH (ref 0.1–0.6)
MONOCYTES NFR BLD AUTO: 10.7 % — HIGH (ref 1.7–9.3)
NEUTROPHILS # BLD AUTO: 6.66 K/UL — HIGH (ref 1.4–6.5)
NEUTROPHILS NFR BLD AUTO: 72.7 % — SIGNIFICANT CHANGE UP (ref 42.2–75.2)
NRBC BLD AUTO-RTO: 0 /100 WBCS — SIGNIFICANT CHANGE UP (ref 0–0)
PLATELET # BLD AUTO: 300 K/UL — SIGNIFICANT CHANGE UP (ref 130–400)
PMV BLD: 10.2 FL — SIGNIFICANT CHANGE UP (ref 7.4–10.4)
POTASSIUM SERPL-MCNC: 4.6 MMOL/L — SIGNIFICANT CHANGE UP (ref 3.5–5)
POTASSIUM SERPL-MCNC: 5 MMOL/L — SIGNIFICANT CHANGE UP (ref 3.5–5)
POTASSIUM SERPL-SCNC: 4.6 MMOL/L — SIGNIFICANT CHANGE UP (ref 3.5–5)
POTASSIUM SERPL-SCNC: 5 MMOL/L — SIGNIFICANT CHANGE UP (ref 3.5–5)
PROT SERPL-MCNC: 5.4 G/DL — LOW (ref 6–8)
PROT SERPL-MCNC: 6 G/DL — SIGNIFICANT CHANGE UP (ref 6–8)
RBC # BLD: 2.43 M/UL — LOW (ref 4.7–6.1)
RBC # FLD: 12.9 % — SIGNIFICANT CHANGE UP (ref 11.5–14.5)
SODIUM SERPL-SCNC: 136 MMOL/L — SIGNIFICANT CHANGE UP (ref 135–146)
SODIUM SERPL-SCNC: 137 MMOL/L — SIGNIFICANT CHANGE UP (ref 135–146)
WBC # BLD: 9.16 K/UL — SIGNIFICANT CHANGE UP (ref 4.8–10.8)
WBC # FLD AUTO: 9.16 K/UL — SIGNIFICANT CHANGE UP (ref 4.8–10.8)

## 2025-04-10 PROCEDURE — 71045 X-RAY EXAM CHEST 1 VIEW: CPT | Mod: 26

## 2025-04-10 PROCEDURE — 93010 ELECTROCARDIOGRAM REPORT: CPT

## 2025-04-10 PROCEDURE — 99291 CRITICAL CARE FIRST HOUR: CPT

## 2025-04-10 PROCEDURE — 99232 SBSQ HOSP IP/OBS MODERATE 35: CPT | Mod: 24

## 2025-04-10 RX ADMIN — LIDOCAINE HYDROCHLORIDE 1 PATCH: 20 JELLY TOPICAL at 06:51

## 2025-04-10 RX ADMIN — Medication 500 MILLIGRAM(S): at 12:21

## 2025-04-10 RX ADMIN — Medication 325 MILLIGRAM(S): at 12:20

## 2025-04-10 RX ADMIN — POLYETHYLENE GLYCOL 3350 17 GRAM(S): 17 POWDER, FOR SOLUTION ORAL at 12:21

## 2025-04-10 RX ADMIN — Medication 1 APPLICATION(S): at 05:55

## 2025-04-10 RX ADMIN — ENOXAPARIN SODIUM 40 MILLIGRAM(S): 100 INJECTION SUBCUTANEOUS at 13:01

## 2025-04-10 RX ADMIN — METOPROLOL SUCCINATE 12.5 MILLIGRAM(S): 50 TABLET, EXTENDED RELEASE ORAL at 06:44

## 2025-04-10 RX ADMIN — OXYCODONE HYDROCHLORIDE 5 MILLIGRAM(S): 30 TABLET ORAL at 22:19

## 2025-04-10 RX ADMIN — FUROSEMIDE 20 MILLIGRAM(S): 10 INJECTION INTRAMUSCULAR; INTRAVENOUS at 06:44

## 2025-04-10 RX ADMIN — Medication 20 MILLIGRAM(S): at 06:44

## 2025-04-10 RX ADMIN — ATORVASTATIN CALCIUM 40 MILLIGRAM(S): 80 TABLET, FILM COATED ORAL at 22:19

## 2025-04-10 RX ADMIN — IPRATROPIUM BROMIDE AND ALBUTEROL SULFATE 3 MILLILITER(S): .5; 2.5 SOLUTION RESPIRATORY (INHALATION) at 01:49

## 2025-04-10 RX ADMIN — Medication 4 MILLILITER(S): at 08:08

## 2025-04-10 RX ADMIN — DEXTROMETHORPHAN HBR, GUAIFENESIN 600 MILLIGRAM(S): 200 LIQUID ORAL at 06:44

## 2025-04-10 RX ADMIN — FOLIC ACID 1 MILLIGRAM(S): 1 TABLET ORAL at 12:21

## 2025-04-10 RX ADMIN — LIDOCAINE HYDROCHLORIDE 1 PATCH: 20 JELLY TOPICAL at 09:20

## 2025-04-10 RX ADMIN — Medication 25 GRAM(S): at 22:18

## 2025-04-10 RX ADMIN — Medication 25 GRAM(S): at 06:45

## 2025-04-10 RX ADMIN — IPRATROPIUM BROMIDE AND ALBUTEROL SULFATE 3 MILLILITER(S): .5; 2.5 SOLUTION RESPIRATORY (INHALATION) at 08:08

## 2025-04-10 RX ADMIN — Medication 25 GRAM(S): at 13:01

## 2025-04-10 RX ADMIN — Medication 1 TABLET(S): at 22:19

## 2025-04-10 RX ADMIN — Medication 4 MILLILITER(S): at 20:52

## 2025-04-10 RX ADMIN — IPRATROPIUM BROMIDE AND ALBUTEROL SULFATE 3 MILLILITER(S): .5; 2.5 SOLUTION RESPIRATORY (INHALATION) at 13:48

## 2025-04-10 RX ADMIN — METOPROLOL SUCCINATE 12.5 MILLIGRAM(S): 50 TABLET, EXTENDED RELEASE ORAL at 17:25

## 2025-04-10 RX ADMIN — DEXTROMETHORPHAN HBR, GUAIFENESIN 600 MILLIGRAM(S): 200 LIQUID ORAL at 17:25

## 2025-04-10 RX ADMIN — Medication 20 MILLIGRAM(S): at 17:25

## 2025-04-10 RX ADMIN — OXYCODONE HYDROCHLORIDE 5 MILLIGRAM(S): 30 TABLET ORAL at 13:01

## 2025-04-10 RX ADMIN — Medication 5 MILLIGRAM(S): at 17:24

## 2025-04-10 RX ADMIN — CLOPIDOGREL BISULFATE 75 MILLIGRAM(S): 75 TABLET, FILM COATED ORAL at 12:21

## 2025-04-10 RX ADMIN — OXYCODONE HYDROCHLORIDE 5 MILLIGRAM(S): 30 TABLET ORAL at 23:00

## 2025-04-10 RX ADMIN — IPRATROPIUM BROMIDE AND ALBUTEROL SULFATE 3 MILLILITER(S): .5; 2.5 SOLUTION RESPIRATORY (INHALATION) at 20:52

## 2025-04-10 RX ADMIN — OXYCODONE HYDROCHLORIDE 10 MILLIGRAM(S): 30 TABLET ORAL at 17:37

## 2025-04-10 RX ADMIN — FUROSEMIDE 20 MILLIGRAM(S): 10 INJECTION INTRAMUSCULAR; INTRAVENOUS at 17:25

## 2025-04-10 RX ADMIN — OXYCODONE HYDROCHLORIDE 5 MILLIGRAM(S): 30 TABLET ORAL at 14:32

## 2025-04-10 RX ADMIN — Medication 1 TABLET(S): at 12:23

## 2025-04-10 NOTE — DISCHARGE NOTE NURSING/CASE MANAGEMENT/SOCIAL WORK - FINANCIAL ASSISTANCE
[facility] provides services at a reduced cost to those who are determined to be eligible through [facility]’s financial assistance program. Information regarding [facility]’s financial assistance program can be found by going to [email or web address] or by calling [phone].

## 2025-04-10 NOTE — DISCHARGE NOTE NURSING/CASE MANAGEMENT/SOCIAL WORK - PATIENT PORTAL LINK FT
You can access the FollowMyHealth Patient Portal offered by [facility] by registering at the following website: [email or web address]. By joining [employer]’s FollowMyHealth portal, you will also be able to view your health information using other applications (apps) compatible with our system.

## 2025-04-10 NOTE — PROGRESS NOTE ADULT - SUBJECTIVE AND OBJECTIVE BOX
CTU Attending Progress Daily Note     04 Apr 2025 17:12  Procedure: OPCABx4  POD#: 6    He has history of   HTN (hypertension)  Anxiety, Stress, and Tension  Depression  Dyslipidemia  [place] (hard of hearing)  Former smoker      HPI: Patient is 55 year-old male former smoker with a past medical history HTN, HLD, and family history of CAD presented to the hospital for an elective diagnostic cardiac catheterization. Patient was seen at the cardiologist's office complaining of intermittent bilateral jaw pain radiating down to the upper left chest. Patient underwent a CCTA showing a calcium score of 768 and multivessel disease. Patient was referred for a LHC. Denies chest palpitations, lightheadedness, dizziness, syncope, nausea, vomiting, and bilateral leg swelling. Today, patient underwent LHC revealing 3vCAD. CT Surgery was consulted for an evaluation of myocardial revascularization via bypass grafts.     OR Data  Procedure: off pump CABGx4  Bypass: N/A  Cross Clamp: N/A   Pre LV Fxn: 50-55%      RV Fxn: nml  Post LV Fxn: 50-55%     RV Fxn: nml  Blood Products: None  Cell Saver: 228 cc  Fluids: 2.5L crystalloid/500 cc Albumin  Pacing Wires: A/V    Hospital Course:  4/4: Arrived intubated on Levophed. Extubated in short course and weaned off pressors  4/5 CT and fem line removed  4/6 started BB  4/7 R pleural effusion drained  4/8 Remains hypoxic, febile, POCUS with B/L consolidation / atelactasis, procal elevated, started abx  4/9 off oxygen  4/10 still cough with sputum , on zosyn, dischage planning for tomorrow , transfer to 4 T    ====================================  OBJECTIVE:  ICU Vital Signs Last 24 Hrs  T(C): 36.9 (10 Apr 2025 12:00), Max: 37.4 (09 Apr 2025 16:00)  T(F): 98.5 (10 Apr 2025 12:00), Max: 99.4 (09 Apr 2025 16:00)  HR: 93 (10 Apr 2025 12:00) (71 - 108)  BP: 128/66 (10 Apr 2025 12:00) (99/54 - 157/70)  BP(mean): 87 (10 Apr 2025 12:00) (69 - 105)  ABP: --  ABP(mean): --  RR: 20 (10 Apr 2025 12:00) (18 - 57)  SpO2: 94% (10 Apr 2025 12:00) (89% - 99%)    O2 Parameters below as of 10 Apr 2025 12:00  Patient On (Oxygen Delivery Method): room air    I&O's Summary    09 Apr 2025 07:01  -  10 Apr 2025 07:00  --------------------------------------------------------  IN: 480 mL / OUT: 1525 mL / NET: -1045 mL    10 Apr 2025 07:01  -  10 Apr 2025 14:08  --------------------------------------------------------  IN: 340 mL / OUT: 600 mL / NET: -260 mL      I&O's Detail    09 Apr 2025 07:01  -  10 Apr 2025 07:00  --------------------------------------------------------  IN:    Oral Fluid: 480 mL  Total IN: 480 mL    OUT:    Voided (mL): 1525 mL  Total OUT: 1525 mL    Total NET: -1045 mL      10 Apr 2025 07:01  -  10 Apr 2025 14:08  --------------------------------------------------------  IN:    IV PiggyBack: 100 mL    Oral Fluid: 240 mL  Total IN: 340 mL    OUT:    Voided (mL): 600 mL  Total OUT: 600 mL    Total NET: -260 mL    CAPILLARY BLOOD GLUCOSE  POCT Blood Glucose.: 137 mg/dL (10 Apr 2025 12:01)  POCT Blood Glucose.: 111 mg/dL (10 Apr 2025 06:48)  POCT Blood Glucose.: 143 mg/dL (09 Apr 2025 16:48)    LABS:                          7.3    9.16  )-----------( 300      ( 10 Apr 2025 02:25 )             22.0     04-10    136  |  100  |  32[H]  ----------------------------<  113[H]  5.0   |  24  |  1.2    Ca    8.2[L]      10 Apr 2025 02:25  Mg     2.1     04-10    TPro  5.4[L]  /  Alb  3.1[L]  /  TBili  0.3  /  DBili  x   /  AST  80[H]  /  ALT  86[H]  /  AlkPhos  195[H]  04-10      Culture - Blood (collected 08 Apr 2025 10:32)  Source: Blood Blood  Preliminary Report (09 Apr 2025 22:01):    No growth at 24 hours      Home Medications:  lisinopril 40 mg oral tablet: 1 tab(s) orally once a day PATIENT NON COMPLIANT WITH MEDICATION (02 Apr 2025 09:30)    HOSPITAL MEDICATIONS:  MEDICATIONS  (STANDING):  albuterol/ipratropium for Nebulization 3 milliLiter(s) Nebulizer every 6 hours  ascorbic acid 500 milliGRAM(s) Oral daily  aspirin enteric coated 325 milliGRAM(s) Oral daily  atorvastatin 40 milliGRAM(s) Oral at bedtime  bisacodyl 5 milliGRAM(s) Oral every 12 hours  chlorhexidine 2% Cloths 1 Application(s) Topical daily  clopidogrel Tablet 75 milliGRAM(s) Oral daily  enoxaparin Injectable 40 milliGRAM(s) SubCutaneous every 24 hours  famotidine    Tablet 20 milliGRAM(s) Oral two times a day  ferrous    sulfate 325 milliGRAM(s) Oral daily  folic acid 1 milliGRAM(s) Oral daily  furosemide   Injectable 20 milliGRAM(s) IV Push every 12 hours  glucagon  Injectable 1 milliGRAM(s) IntraMuscular once  guaiFENesin  milliGRAM(s) Oral every 12 hours  lidocaine   4% Patch 1 Patch Transdermal every 24 hours  metoprolol tartrate 12.5 milliGRAM(s) Oral every 12 hours  multivitamin/minerals 1 Tablet(s) Oral daily  piperacillin/tazobactam IVPB.. 3.375 Gram(s) IV Intermittent every 8 hours  polyethylene glycol 3350 17 Gram(s) Oral daily  senna 1 Tablet(s) Oral at bedtime  sodium chloride 3%  Inhalation 4 milliLiter(s) Inhalation every 12 hours    MEDICATIONS  (PRN):  ibuprofen  Tablet. 400 milliGRAM(s) Oral every 8 hours PRN Temp greater or equal to 38C (100.4F)  melatonin 5 milliGRAM(s) Oral at bedtime PRN Insomnia  oxyCODONE    IR 5 milliGRAM(s) Oral every 4 hours PRN Moderate Pain (4 - 6)  oxyCODONE    IR 10 milliGRAM(s) Oral every 4 hours PRN Severe Pain (7 - 10)    RADIOLOGY:  Chest X-ray Reviewed    REVIEW OF SYSTEMS:  CONSTITUTIONAL: [X] all negative; [ ] weakness, [ ] fevers, [ ] chills  EYES/ENT: [X] all negative; [ ] visual changes, [ ] vertigo, [ ] throat pain   NECK: [X] all negative; [ ] pain, [ ] stiffness  RESPIRATORY: [] all negative, [+ ] cough, [ ] wheezing, [ ] hemoptysis, [ ] shortness of breath  CARDIOVASCULAR: [] all negative; [ ] chest pain, [ ] palpitations, [ ] orthopnea  GASTROINTESTINAL: [X] all negative; [ ]abdominal pain, [ ] nausea, [ ] vomiting, [ ] hematemesis, [ ] diarrhea, [ ] constipation, [ ] melena, [ ] hematochezia.  GENITOURINARY: [X] all negative; [ ] dysuria, [ ] frequency, [ ] hematuria  NEUROLOGICAL: [X] all negative; [ ] numbness, [ ] weakness  SKIN: [X] all negative; [ ] itching, [ ] burning, [ ] rashes, [ ] lesions   All other review of systems is negative unless indicated above.  [  ] Unable to assess ROS because   PHYSICAL EXAM:          CONSTITUTIONAL: No acute distress  	SKIN: warm, dry  	EYES: PERRL, EOM, no conj injection, sclera clear  	NECK: JVP mid neck   	CARD: Regular rate and rhythm, no murmurs, rubs or gallops  	RESP: CTA B/L; no wheezes, rales or rhonchi.  	ABD: Soft, not tender, not distended, bowel sounds present  	EXT: 2+ pulses on all extremities, no clubbing, cyanosis or edema  	NEURO: A+Ox3, strength intact throughout    Assessment   s/p OPCABx4 currently progressing well.     Plan:    Neuro:  Multimodal pain management  Tylenol, Lidoderm patch, gabapentin, opiates as needed  Monitor neuro status in the post op period    CV:  S/P OPCAB  Monitor perfusion, , lactate, UOP  ASA, statin  On beta blockers  Lasix BID - goal negative balance  Plavix    Resp:  Continuous pulse oximetry monitoring  IS / Chest PT  OOBTC and Ambulate once extubated  Nebulizers as needed  R pleural effusion drained  Fevers improved  Trial of IPV  Ipatropium and 3% nebs - aggressive pulm toilet    GI:  Heart healthy diet  Bowel Regimen - escalate as needed  PUD ppx per protocol    Renal  High risk for BRENT post surgery  Monitor UOP, lytes, creatinine  Diuretics as needed for negative fluid balance  Keep K > 4, Mg > 2    Endo:  Tight glucose control per CTICU protocl    Heme:  Acute blood loss anemia post surgery  High risk for thrombocytopenia  DVT ppx with lovenox  IV iron / FA / MVI    ID:  Febrile  no WBC, procal elevated  Zosyn for possible PNA  BCx pending  UA negative    Upon my evaluation, the above patient has a high probability of imminent or life threatening deterioration due to a high risk for shock, cardiogenic shock, arrhythmias, acute blood loss, acute kidney injury and acute pulmonary insufficiency which required my direct attention, intervention, and personal management.  Total time spent includes review of radiology results, ordering, interpreting and reviewing diagnostic studies / lab tests with immediate assessment and treatment, consultant collaboration on findings and treatment options, monitoring for potential decompensation, obtaining history from family, EMT, nursing home staff and/or treating physicians; directing the titration of pressors, oxygen support devices, fluid resuscitation, interpretation of cardiac output measurements, interpretation of radiological assessments, interpretation of EKG / rhythm strips, telemetry.  This time did not overlap with the management of other patients or performing separately reportable procedures.      Management of this patient was discussed with the CT surgery attending and mid-level providers.    I acknowledge the use of copied documentation.  All copy forward documentation is my own and has been reviewed and revised as appropriate.  If no changes were made, it is because that information remains the same. CTU Attending Progress Daily Note     04 Apr 2025 17:12  Procedure: OPCABx4  POD#: 6    He has history of   HTN (hypertension)  Anxiety, Stress, and Tension  Depression  Dyslipidemia  [place] (hard of hearing)  Former smoker      HPI: Patient is 55 year-old male former smoker with a past medical history HTN, HLD, and family history of CAD presented to the hospital for an elective diagnostic cardiac catheterization. Patient was seen at the cardiologist's office complaining of intermittent bilateral jaw pain radiating down to the upper left chest. Patient underwent a CCTA showing a calcium score of 768 and multivessel disease. Patient was referred for a LHC. Denies chest palpitations, lightheadedness, dizziness, syncope, nausea, vomiting, and bilateral leg swelling. Today, patient underwent LHC revealing 3vCAD. CT Surgery was consulted for an evaluation of myocardial revascularization via bypass grafts.     OR Data  Procedure: off pump CABGx4  Bypass: N/A  Cross Clamp: N/A   Pre LV Fxn: 50-55%      RV Fxn: nml  Post LV Fxn: 50-55%     RV Fxn: nml  Blood Products: None  Cell Saver: 228 cc  Fluids: 2.5L crystalloid/500 cc Albumin  Pacing Wires: A/V    Hospital Course:  4/4: Arrived intubated on Levophed. Extubated in short course and weaned off pressors  4/5 CT and fem line removed  4/6 started BB  4/7 R pleural effusion drained  4/8 Remains hypoxic, febile, POCUS with B/L consolidation / atelactasis, procal elevated, started abx  4/9 off oxygen  4/10 still cough with sputum , on zosyn, dischage planning for tomorrow , transfer to 4 T    ====================================  OBJECTIVE:  ICU Vital Signs Last 24 Hrs  T(C): 36.9 (10 Apr 2025 12:00), Max: 37.4 (09 Apr 2025 16:00)  T(F): 98.5 (10 Apr 2025 12:00), Max: 99.4 (09 Apr 2025 16:00)  HR: 93 (10 Apr 2025 12:00) (71 - 108)  BP: 128/66 (10 Apr 2025 12:00) (99/54 - 157/70)  BP(mean): 87 (10 Apr 2025 12:00) (69 - 105)  ABP: --  ABP(mean): --  RR: 20 (10 Apr 2025 12:00) (18 - 57)  SpO2: 94% (10 Apr 2025 12:00) (89% - 99%)    O2 Parameters below as of 10 Apr 2025 12:00  Patient On (Oxygen Delivery Method): room air          I&O's Summary    09 Apr 2025 07:01  -  10 Apr 2025 07:00  --------------------------------------------------------  IN: 480 mL / OUT: 1525 mL / NET: -1045 mL    10 Apr 2025 07:01  -  10 Apr 2025 14:35  --------------------------------------------------------  IN: 340 mL / OUT: 600 mL / NET: -260 mL      I&O's Detail    09 Apr 2025 07:01  -  10 Apr 2025 07:00  --------------------------------------------------------  IN:    Oral Fluid: 480 mL  Total IN: 480 mL    OUT:    Voided (mL): 1525 mL  Total OUT: 1525 mL    Total NET: -1045 mL      10 Apr 2025 07:01  -  10 Apr 2025 14:35  --------------------------------------------------------  IN:    IV PiggyBack: 100 mL    Oral Fluid: 240 mL  Total IN: 340 mL    OUT:    Voided (mL): 600 mL  Total OUT: 600 mL    Total NET: -260 mL        Adult Advanced Hemodynamics Last 24 Hrs  CVP(mm Hg): --  CVP(cm H2O): --  CO: --  CI: --  PA: --  PA(mean): --  PCWP: --  SVR: --  SVRI: --  PVR: --  PVRI: --    CAPILLARY BLOOD GLUCOSE      POCT Blood Glucose.: 137 mg/dL (10 Apr 2025 12:01)  POCT Blood Glucose.: 111 mg/dL (10 Apr 2025 06:48)  POCT Blood Glucose.: 143 mg/dL (09 Apr 2025 16:48)    LABS:                          7.3    9.16  )-----------( 300      ( 10 Apr 2025 02:25 )             22.0     04-10    136  |  100  |  32[H]  ----------------------------<  113[H]  5.0   |  24  |  1.2    Ca    8.2[L]      10 Apr 2025 02:25  Mg     2.1     04-10    TPro  5.4[L]  /  Alb  3.1[L]  /  TBili  0.3  /  DBili  x   /  AST  80[H]  /  ALT  86[H]  /  AlkPhos  195[H]  04-10      Urinalysis Basic - ( 10 Apr 2025 02:25 )    Color: x / Appearance: x / SG: x / pH: x  Gluc: 113 mg/dL / Ketone: x  / Bili: x / Urobili: x   Blood: x / Protein: x / Nitrite: x   Leuk Esterase: x / RBC: x / WBC x   Sq Epi: x / Non Sq Epi: x / Bacteria: x        Culture - Blood (collected 08 Apr 2025 10:32)  Source: Blood Blood  Preliminary Report (09 Apr 2025 22:01):    No growth at 24 hours      Home Medications:  lisinopril 40 mg oral tablet: 1 tab(s) orally once a day PATIENT NON COMPLIANT WITH MEDICATION (02 Apr 2025 09:30)    HOSPITAL MEDICATIONS:  MEDICATIONS  (STANDING):  albuterol/ipratropium for Nebulization 3 milliLiter(s) Nebulizer every 6 hours  ascorbic acid 500 milliGRAM(s) Oral daily  aspirin enteric coated 325 milliGRAM(s) Oral daily  atorvastatin 40 milliGRAM(s) Oral at bedtime  bisacodyl 5 milliGRAM(s) Oral every 12 hours  chlorhexidine 2% Cloths 1 Application(s) Topical daily  clopidogrel Tablet 75 milliGRAM(s) Oral daily  enoxaparin Injectable 40 milliGRAM(s) SubCutaneous every 24 hours  famotidine    Tablet 20 milliGRAM(s) Oral two times a day  ferrous    sulfate 325 milliGRAM(s) Oral daily  folic acid 1 milliGRAM(s) Oral daily  furosemide   Injectable 20 milliGRAM(s) IV Push every 12 hours  glucagon  Injectable 1 milliGRAM(s) IntraMuscular once  guaiFENesin  milliGRAM(s) Oral every 12 hours  lidocaine   4% Patch 1 Patch Transdermal every 24 hours  metoprolol tartrate 12.5 milliGRAM(s) Oral every 12 hours  multivitamin/minerals 1 Tablet(s) Oral daily  piperacillin/tazobactam IVPB.. 3.375 Gram(s) IV Intermittent every 8 hours  polyethylene glycol 3350 17 Gram(s) Oral daily  senna 1 Tablet(s) Oral at bedtime  sodium chloride 3%  Inhalation 4 milliLiter(s) Inhalation every 12 hours    MEDICATIONS  (PRN):  ibuprofen  Tablet. 400 milliGRAM(s) Oral every 8 hours PRN Temp greater or equal to 38C (100.4F)  melatonin 5 milliGRAM(s) Oral at bedtime PRN Insomnia  oxyCODONE    IR 5 milliGRAM(s) Oral every 4 hours PRN Moderate Pain (4 - 6)  oxyCODONE    IR 10 milliGRAM(s) Oral every 4 hours PRN Severe Pain (7 - 10)    RADIOLOGY:  Chest X-ray Reviewed    REVIEW OF SYSTEMS:  CONSTITUTIONAL: [X] all negative; [ ] weakness, [ ] fevers, [ ] chills  EYES/ENT: [X] all negative; [ ] visual changes, [ ] vertigo, [ ] throat pain   NECK: [X] all negative; [ ] pain, [ ] stiffness  RESPIRATORY: [] all negative, [+ ] cough, [ ] wheezing, [ ] hemoptysis, [ ] shortness of breath  CARDIOVASCULAR: [x] all negative; [ ] chest pain, [ ] palpitations, [ ] orthopnea  GASTROINTESTINAL: [X] all negative; [ ]abdominal pain, [ ] nausea, [ ] vomiting, [ ] hematemesis, [ ] diarrhea, [ ] constipation, [ ] melena, [ ] hematochezia.  GENITOURINARY: [X] all negative; [ ] dysuria, [ ] frequency, [ ] hematuria  NEUROLOGICAL: [X] all negative; [ ] numbness, [ ] weakness  SKIN: [X] all negative; [ ] itching, [ ] burning, [ ] rashes, [ ] lesions   All other review of systems is negative unless indicated above.  [  ] Unable to assess ROS because     PHYSICAL EXAM:          CONSTITUTIONAL: No acute distress  	SKIN: warm, dry  	EYES: PERRL, EOM, no conj injection, sclera clear  	NECK: JVP mid neck   	CARD: Regular rate and rhythm, no murmurs, rubs or gallops  	RESP: CTA B/L; no wheezes, rales or  rhonchi.  	ABD: Soft, not tender, not distended, bowel sounds present  	EXT: 2+ pulses on all extremities, no clubbing, cyanosis or edema  	NEURO: A+Ox3, strength intact throughout    Assessment   s/p OPCABx4 currently progressing well.     Plan:    Neuro:  Multimodal pain management  Tylenol, Lidoderm patch, gabapentin, opiates as needed  Monitor neuro status in the post op period    CV:  S/P OPCAB  Monitor perfusion, , lactate, UOP  ASA, statin  On beta blockers  Lasix BID - goal negative balance  Plavix    Resp:  Continuous pulse oximetry monitoring  IS / Chest PT  OOBTC and Ambulate once extubated  Nebulizers as needed  R pleural effusion drained  Fevers improved  Trial of IPV  Ipatropium and 3% nebs - aggressive pulm toilet    GI:  Heart healthy diet  Bowel Regimen - escalate as needed  PUD ppx per protocol    Renal  High risk for BRENT post surgery  Monitor UOP, lytes, creatinine  Diuretics as needed for negative fluid balance  Keep K > 4, Mg > 2    Endo:  Tight glucose control per CTICU protocl    Heme:  Acute blood loss anemia post surgery  High risk for thrombocytopenia  DVT ppx with lovenox  IV iron / FA / MVI    ID:  Febrile  no WBC, procal elevated  Zosyn for possible PNA/ Bronchitis  BCx pending  UA negative    Patient requires continuous monitoring with:  bedside rhythm monitoring, continuous  pulse oximetry monitoring, O2 supplement titrate for O2 sat above 90%  ;   ventilator management and monitoring; intermittent blood gas analysis.  Care plan discussed with CT ICU care team and attending surgeon Dr Hank Elliott  complex MDM, at risk for life threatening decompensation; required more than usual post op care;   I have spent 35 minutes providing non routine post op care, revaluated multiple times.    Yvonne Pearson MD  intensivist    CTU Attending Progress Daily Note     04 Apr 2025 17:12  Procedure: OPCABx4  POD#: 6    He has history of   HTN (hypertension)  Anxiety, Stress, and Tension  Depression  Dyslipidemia  [place] (hard of hearing)  Former smoker      HPI: Patient is 55 year-old male former smoker with a past medical history HTN, HLD, and family history of CAD presented to the hospital for an elective diagnostic cardiac catheterization. Patient was seen at the cardiologist's office complaining of intermittent bilateral jaw pain radiating down to the upper left chest. Patient underwent a CCTA showing a calcium score of 768 and multivessel disease. Patient was referred for a LHC. Denies chest palpitations, lightheadedness, dizziness, syncope, nausea, vomiting, and bilateral leg swelling. Today, patient underwent LHC revealing 3vCAD. CT Surgery was consulted for an evaluation of myocardial revascularization via bypass grafts.     OR Data  Procedure: off pump CABGx4  Bypass: N/A  Cross Clamp: N/A   Pre LV Fxn: 50-55%      RV Fxn: nml  Post LV Fxn: 50-55%     RV Fxn: nml  Blood Products: None  Cell Saver: 228 cc  Fluids: 2.5L crystalloid/500 cc Albumin  Pacing Wires: A/V    Hospital Course:  4/4: Arrived intubated on Levophed. Extubated in short course and weaned off pressors  4/5 CT and fem line removed  4/6 started BB  4/7 R pleural effusion drained  4/8 Remains hypoxic, febile, POCUS with B/L consolidation / atelactasis, procal elevated, started abx  4/9 off oxygen  4/10 still cough with sputum , on zosyn, dischage planning for tomorrow , transfer to 4 T    ====================================  OBJECTIVE:  ICU Vital Signs Last 24 Hrs  T(C): 36.9 (10 Apr 2025 12:00), Max: 37.4 (09 Apr 2025 16:00)  T(F): 98.5 (10 Apr 2025 12:00), Max: 99.4 (09 Apr 2025 16:00)  HR: 93 (10 Apr 2025 12:00) (71 - 108)  BP: 128/66 (10 Apr 2025 12:00) (99/54 - 157/70)  BP(mean): 87 (10 Apr 2025 12:00) (69 - 105)  ABP: --  ABP(mean): --  RR: 20 (10 Apr 2025 12:00) (18 - 57)  SpO2: 94% (10 Apr 2025 12:00) (89% - 99%)    O2 Parameters below as of 10 Apr 2025 12:00  Patient On (Oxygen Delivery Method): room air          I&O's Summary    09 Apr 2025 07:01  -  10 Apr 2025 07:00  --------------------------------------------------------  IN: 480 mL / OUT: 1525 mL / NET: -1045 mL    10 Apr 2025 07:01  -  10 Apr 2025 14:35  --------------------------------------------------------  IN: 340 mL / OUT: 600 mL / NET: -260 mL      I&O's Detail    09 Apr 2025 07:01  -  10 Apr 2025 07:00  --------------------------------------------------------  IN:    Oral Fluid: 480 mL  Total IN: 480 mL    OUT:    Voided (mL): 1525 mL  Total OUT: 1525 mL    Total NET: -1045 mL      10 Apr 2025 07:01  -  10 Apr 2025 14:35  --------------------------------------------------------  IN:    IV PiggyBack: 100 mL    Oral Fluid: 240 mL  Total IN: 340 mL    OUT:    Voided (mL): 600 mL  Total OUT: 600 mL    Total NET: -260 mL    CAPILLARY BLOOD GLUCOSE  POCT Blood Glucose.: 137 mg/dL (10 Apr 2025 12:01)  POCT Blood Glucose.: 111 mg/dL (10 Apr 2025 06:48)  POCT Blood Glucose.: 143 mg/dL (09 Apr 2025 16:48)    LABS:                        7.3    9.16  )-----------( 300      ( 10 Apr 2025 02:25 )             22.0     04-10    136  |  100  |  32[H]  ----------------------------<  113[H]  5.0   |  24  |  1.2    Ca    8.2[L]      10 Apr 2025 02:25  Mg     2.1     04-10    TPro  5.4[L]  /  Alb  3.1[L]  /  TBili  0.3  /  DBili  x   /  AST  80[H]  /  ALT  86[H]  /  AlkPhos  195[H]  04-10      Culture - Blood (collected 08 Apr 2025 10:32)  Source: Blood Blood  Preliminary Report (09 Apr 2025 22:01):    No growth at 24 hours      Home Medications:  lisinopril 40 mg oral tablet: 1 tab(s) orally once a day PATIENT NON COMPLIANT WITH MEDICATION (02 Apr 2025 09:30)    HOSPITAL MEDICATIONS:  MEDICATIONS  (STANDING):  albuterol/ipratropium for Nebulization 3 milliLiter(s) Nebulizer every 6 hours  ascorbic acid 500 milliGRAM(s) Oral daily  aspirin enteric coated 325 milliGRAM(s) Oral daily  atorvastatin 40 milliGRAM(s) Oral at bedtime  bisacodyl 5 milliGRAM(s) Oral every 12 hours  chlorhexidine 2% Cloths 1 Application(s) Topical daily  clopidogrel Tablet 75 milliGRAM(s) Oral daily  enoxaparin Injectable 40 milliGRAM(s) SubCutaneous every 24 hours  famotidine    Tablet 20 milliGRAM(s) Oral two times a day  ferrous    sulfate 325 milliGRAM(s) Oral daily  folic acid 1 milliGRAM(s) Oral daily  furosemide   Injectable 20 milliGRAM(s) IV Push every 12 hours  glucagon  Injectable 1 milliGRAM(s) IntraMuscular once  guaiFENesin  milliGRAM(s) Oral every 12 hours  lidocaine   4% Patch 1 Patch Transdermal every 24 hours  metoprolol tartrate 12.5 milliGRAM(s) Oral every 12 hours  multivitamin/minerals 1 Tablet(s) Oral daily  piperacillin/tazobactam IVPB.. 3.375 Gram(s) IV Intermittent every 8 hours  polyethylene glycol 3350 17 Gram(s) Oral daily  senna 1 Tablet(s) Oral at bedtime  sodium chloride 3%  Inhalation 4 milliLiter(s) Inhalation every 12 hours    MEDICATIONS  (PRN):  ibuprofen  Tablet. 400 milliGRAM(s) Oral every 8 hours PRN Temp greater or equal to 38C (100.4F)  melatonin 5 milliGRAM(s) Oral at bedtime PRN Insomnia  oxyCODONE    IR 5 milliGRAM(s) Oral every 4 hours PRN Moderate Pain (4 - 6)  oxyCODONE    IR 10 milliGRAM(s) Oral every 4 hours PRN Severe Pain (7 - 10)    RADIOLOGY:  Chest X-ray Reviewed    REVIEW OF SYSTEMS:  CONSTITUTIONAL: [X] all negative; [ ] weakness, [ ] fevers, [ ] chills  EYES/ENT: [X] all negative; [ ] visual changes, [ ] vertigo, [ ] throat pain   NECK: [X] all negative; [ ] pain, [ ] stiffness  RESPIRATORY: [] all negative, [+ ] cough, [ ] wheezing, [ ] hemoptysis, [ ] shortness of breath  CARDIOVASCULAR: [x] all negative; [ ] chest pain, [ ] palpitations, [ ] orthopnea  GASTROINTESTINAL: [X] all negative; [ ]abdominal pain, [ ] nausea, [ ] vomiting, [ ] hematemesis, [ ] diarrhea, [ ] constipation, [ ] melena, [ ] hematochezia.  GENITOURINARY: [X] all negative; [ ] dysuria, [ ] frequency, [ ] hematuria  NEUROLOGICAL: [X] all negative; [ ] numbness, [ ] weakness  SKIN: [X] all negative; [ ] itching, [ ] burning, [ ] rashes, [ ] lesions   All other review of systems is negative unless indicated above.  [  ] Unable to assess ROS because     PHYSICAL EXAM:          CONSTITUTIONAL: No acute distress  	SKIN: warm, dry  	EYES: PERRL, EOM, no conj injection, sclera clear  	NECK: JVP mid neck   	CARD: Regular rate and rhythm, no murmurs, rubs or gallops  	RESP: CTA B/L; no wheezes, rales or  rhonchi.  	ABD: Soft, not tender, not distended, bowel sounds present  	EXT: 2+ pulses on all extremities, no clubbing, cyanosis or edema  	NEURO: A+Ox3, strength intact throughout    Assessment   s/p OPCABx4 currently progressing well.     Plan:    Neuro:  Multimodal pain management  Tylenol, Lidoderm patch, gabapentin, opiates as needed  Monitor neuro status in the post op period    CV:  S/P OPCAB  Monitor perfusion, , lactate, UOP  ASA, statin  On beta blockers  Lasix BID - goal negative balance  Plavix    Resp:  Continuous pulse oximetry monitoring  IS / Chest PT  OOBTC and Ambulate once extubated  Nebulizers as needed  R pleural effusion drained  Fevers improved  Trial of IPV  Ipatropium and 3% nebs - aggressive pulm toilet    GI:  Heart healthy diet  Bowel Regimen - escalate as needed  PUD ppx per protocol    Renal  High risk for BRENT post surgery  Monitor UOP, lytes, creatinine  Diuretics as needed for negative fluid balance  Keep K > 4, Mg > 2    Endo:  Tight glucose control per CTICU protocl    Heme:  Acute blood loss anemia post surgery  High risk for thrombocytopenia  DVT ppx with lovenox  IV iron / FA / MVI    ID:  Febrile  no WBC, procal elevated  Zosyn for possible PNA/ Bronchitis  BCx negative   UA negative    Patient requires continuous monitoring with:  bedside rhythm monitoring, continuous  pulse oximetry monitoring, O2 supplement titrate for O2 sat above 90%  ;   Care plan discussed with CT ICU care team and attending surgeon Dr Hank Elliott  complex MDM, at risk for life threatening decompensation; required more than usual post op care;   I have spent 35 minutes providing non routine post op care, revaluated multiple times.    Yvonne Pearson MD  intensivist

## 2025-04-10 NOTE — PROGRESS NOTE ADULT - SUBJECTIVE AND OBJECTIVE BOX
OPERATIVE PROCEDURE(s): CABGx4              POD # 6                                  SURGEON(s): MICHELLE Young      SUBJECTIVE ASSESSMENT:55yMale patient seen and examined at bedside. No complaints at this time    Vital Signs Last 24 Hrs  T(F): 99 (09 Apr 2025 20:00), Max: 99.4 (09 Apr 2025 16:00)  HR: 78 (10 Apr 2025 07:00) (71 - 108)  BP: 129/82 (10 Apr 2025 07:00) (99/54 - 157/70)  BP(mean): 101 (10 Apr 2025 07:00) (69 - 115)  RR: 29 (10 Apr 2025 07:00) (18 - 57)  SpO2: 96% (10 Apr 2025 07:00) (89% - 99%)    I&O's Detail    09 Apr 2025 07:01  -  10 Apr 2025 07:00  --------------------------------------------------------  IN:    Oral Fluid: 480 mL  Total IN: 480 mL    OUT:    Voided (mL): 1525 mL  Total OUT: 1525 mL        Net: I&O's Detail    08 Apr 2025 07:01  -  09 Apr 2025 07:00  --------------------------------------------------------  Total NET: -190 mL      09 Apr 2025 07:01  -  10 Apr 2025 07:00  --------------------------------------------------------  Total NET: -1045 mL        CAPILLARY BLOOD GLUCOSE      POCT Blood Glucose.: 111 mg/dL (10 Apr 2025 06:48)  POCT Blood Glucose.: 143 mg/dL (09 Apr 2025 16:48)  POCT Blood Glucose.: 138 mg/dL (09 Apr 2025 11:26)    A1C with Estimated Average Glucose Result: 5.6 % (04-02-25 @ 14:00)      Physical Exam:  General: NAD; A&Ox3, no focal deficits, clear speech   Cardiac: S1/S2, RRR, no murmur, no rubs  Lungs: unlabored respirations, CTA b/l, no wheeze, no rales, no crackles  Abdomen: Soft/NT/ND; positive bowel sounds x 4  Sternum: Intact, no click, incision healing well with no drainage  Incisions: Incisions clean/dry/intact  Extremities: mild edema b/l lower extremities; good capillary refill; no cyanosis; palpable 1+ pedal pulses b/l      BOWEL MOVEMENT:  [X] YES [] NO, If No, Timing since last BM Day #         LABS:                        7.3[L]  9.16  )-----------( 300      ( 10 Apr 2025 02:25 )             22.0[L]                        7.6[L]  7.81  )-----------( 225      ( 09 Apr 2025 02:43 )             22.1[L]    04-10    136  |  100  |  32[H]  ----------------------------<  113[H]  5.0   |  24  |  1.2  04-09    132[L]  |  99  |  24[H]  ----------------------------<  125[H]  4.4   |  24  |  0.9    Ca    8.2[L]      10 Apr 2025 02:25  Mg     2.1     04-10    TPro  5.4[L] [6.0 - 8.0]  /  Alb  3.1[L] [3.5 - 5.2]  /  TBili  0.3 [0.2 - 1.2]  /  DBili  x   /  AST  80[H] [0 - 41]  /  ALT  86[H] [0 - 41]  /  AlkPhos  195[H] [30 - 115]  04-10          RADIOLOGY & ADDITIONAL TESTS:  CXR:   < from: Xray Chest 1 View- PORTABLE-Routine (Xray Chest 1 View- PORTABLE-Routine in AM.) (04.10.25 @ 06:48) >    INTERPRETATION:  CLINICAL HISTORY: Follow-up.    COMPARISON: April 9, 2025.    TECHNIQUE: Portable frontal chest radiograph. Low lung volume.    FINDINGS:    Support devices: None.    Cardiac/mediastinum/hilum: Stable.    Lung parenchyma/Pleura: Bilateral opacities, unchanged. No pneumothorax   is seen.    Skeleton/soft tissues: Stable.      IMPRESSION: Low lung volume.    Bilateral opacities, unchanged.      EKG:  < from: 12 Lead ECG (04.09.25 @ 07:21) >  Ventricular Rate 77 BPM    Atrial Rate 77 BPM    P-R Interval 132 ms    QRS Duration 94 ms    Q-T Interval 386 ms    QTC Calculation(Bazett) 436 ms    P Axis 23 degrees    R Axis 10 degrees    T Axis 12 degrees    Diagnosis Line Normal sinus rhythm  Possible Left atrial enlargement  Borderline ECG      Allergies    No Known Allergies    Intolerances      MEDICATIONS  (STANDING):  albumin human  5% IVPB 3000 milliLiter(s) IV Intermittent once  albuterol/ipratropium for Nebulization 3 milliLiter(s) Nebulizer every 6 hours  ascorbic acid 500 milliGRAM(s) Oral daily  aspirin enteric coated 325 milliGRAM(s) Oral daily  atorvastatin 40 milliGRAM(s) Oral at bedtime  bisacodyl 5 milliGRAM(s) Oral every 12 hours  chlorhexidine 2% Cloths 1 Application(s) Topical daily  clopidogrel Tablet 75 milliGRAM(s) Oral daily  dextrose 5%. 1000 milliLiter(s) (50 mL/Hr) IV Continuous <Continuous>  dextrose 5%. 1000 milliLiter(s) (100 mL/Hr) IV Continuous <Continuous>  dextrose 50% Injectable 50 milliLiter(s) IV Push every 15 minutes  enoxaparin Injectable 40 milliGRAM(s) SubCutaneous every 24 hours  famotidine    Tablet 20 milliGRAM(s) Oral two times a day  ferrous    sulfate 325 milliGRAM(s) Oral daily  folic acid 1 milliGRAM(s) Oral daily  furosemide   Injectable 20 milliGRAM(s) IV Push every 12 hours  glucagon  Injectable 1 milliGRAM(s) IntraMuscular once  guaiFENesin  milliGRAM(s) Oral every 12 hours  insulin lispro (ADMELOG) corrective regimen sliding scale   SubCutaneous three times a day before meals  insulin lispro (ADMELOG) corrective regimen sliding scale   SubCutaneous at bedtime  lactulose Syrup 20 Gram(s) Oral every 6 hours  lidocaine   4% Patch 1 Patch Transdermal every 24 hours  metoprolol tartrate 12.5 milliGRAM(s) Oral every 12 hours  multivitamin/minerals 1 Tablet(s) Oral daily  piperacillin/tazobactam IVPB.. 3.375 Gram(s) IV Intermittent every 8 hours  polyethylene glycol 3350 17 Gram(s) Oral daily  senna 1 Tablet(s) Oral at bedtime  sodium chloride 3%  Inhalation 4 milliLiter(s) Inhalation every 12 hours    MEDICATIONS  (PRN):  dextrose Oral Gel 15 Gram(s) Oral once PRN Blood Glucose LESS THAN 70 milliGRAM(s)/deciliter  ibuprofen  Tablet. 400 milliGRAM(s) Oral every 8 hours PRN Temp greater or equal to 38C (100.4F)  melatonin 5 milliGRAM(s) Oral at bedtime PRN Insomnia  oxyCODONE    IR 5 milliGRAM(s) Oral every 4 hours PRN Moderate Pain (4 - 6)  oxyCODONE    IR 10 milliGRAM(s) Oral every 4 hours PRN Severe Pain (7 - 10)    Home Medications:  lisinopril 40 mg oral tablet: 1 tab(s) orally once a day PATIENT NON COMPLIANT WITH MEDICATION (02 Apr 2025 09:30)      Pharmacologic DVT Prophylaxis [X] YES, [] NO: Contraindication:  SCD's: YES b/l    GI Prophylaxis: pepcid    Post-Op Beta-Blockers: [X] Yes, [] No: contraindication:  Post-Op Aspirin:  [X] Yes, [] No: contraindication:  Post-Op Statin:  [X] Yes, [] No: contraindication:    Ambulation/Activity Status: ambulate as tolerated    Assessment/Plan:  55y Male status-post  - Case and plan discussed with CTU Intensivist and CT Surgeon - Dr. Mckinney  - Continue CTU supportive care and ongoing plan of care as per continuing CTU rounds.   - Continue DVT/GI prophylaxis  - Incentive Spirometry 10 times an hour  - Continue to advance physical activity as tolerated and continue PT/OT as directed  1. CAD: Continue ASA, statin, BB, plavix, pain control prn  2. HTN: cont to monitor, cont bb   3. A. Fib prophylaxis: cont to monitor electrolytes, cont bb   4. Post-op Hypoxia secondary to acute pulmonary insufficiency: cont nebs, wean o2 as tolerated, encourage incentive spirometry, lasix for noncardiogenic fluid overload. IPV started  5. DM/Glucose Control: cont insulin sliding scale   6. Fever: WBC normal, afebrile today, procalcitonin elevated, continue Zosyn    Social Service Disposition: home with home care today v. tomorrow    OPERATIVE PROCEDURE(s): CABGx4              POD # 6                                  SURGEON(s): MICHELLE Young      SUBJECTIVE ASSESSMENT:55yMale patient seen and examined at bedside. No complaints at this time    Vital Signs Last 24 Hrs  T(F): 99 (09 Apr 2025 20:00), Max: 99.4 (09 Apr 2025 16:00)  HR: 78 (10 Apr 2025 07:00) (71 - 108)  BP: 129/82 (10 Apr 2025 07:00) (99/54 - 157/70)  BP(mean): 101 (10 Apr 2025 07:00) (69 - 115)  RR: 29 (10 Apr 2025 07:00) (18 - 57)  SpO2: 96% (10 Apr 2025 07:00) (89% - 99%)    I&O's Detail    09 Apr 2025 07:01  -  10 Apr 2025 07:00  --------------------------------------------------------  IN:    Oral Fluid: 480 mL  Total IN: 480 mL    OUT:    Voided (mL): 1525 mL  Total OUT: 1525 mL        Net: I&O's Detail    08 Apr 2025 07:01  -  09 Apr 2025 07:00  --------------------------------------------------------  Total NET: -190 mL      09 Apr 2025 07:01  -  10 Apr 2025 07:00  --------------------------------------------------------  Total NET: -1045 mL        CAPILLARY BLOOD GLUCOSE      POCT Blood Glucose.: 111 mg/dL (10 Apr 2025 06:48)  POCT Blood Glucose.: 143 mg/dL (09 Apr 2025 16:48)  POCT Blood Glucose.: 138 mg/dL (09 Apr 2025 11:26)    A1C with Estimated Average Glucose Result: 5.6 % (04-02-25 @ 14:00)      Physical Exam:  General: NAD; A&Ox3, no focal deficits, clear speech   Cardiac: S1/S2, RRR, no murmur, no rubs  Lungs: unlabored shallow respirations, bilateral bases decreased  Abdomen: Soft/NT/ND; positive bowel sounds x 4  Sternum: Intact, no click, incision healing well with no drainage  Incisions: Incisions clean/dry/intact  Extremities: mild edema b/l lower extremities; good capillary refill; no cyanosis; palpable 1+ pedal pulses b/l      BOWEL MOVEMENT:  [X] YES [] NO, If No, Timing since last BM Day #         LABS:                        7.3[L]  9.16  )-----------( 300      ( 10 Apr 2025 02:25 )             22.0[L]                        7.6[L]  7.81  )-----------( 225      ( 09 Apr 2025 02:43 )             22.1[L]    04-10    136  |  100  |  32[H]  ----------------------------<  113[H]  5.0   |  24  |  1.2  04-09    132[L]  |  99  |  24[H]  ----------------------------<  125[H]  4.4   |  24  |  0.9    Ca    8.2[L]      10 Apr 2025 02:25  Mg     2.1     04-10    TPro  5.4[L] [6.0 - 8.0]  /  Alb  3.1[L] [3.5 - 5.2]  /  TBili  0.3 [0.2 - 1.2]  /  DBili  x   /  AST  80[H] [0 - 41]  /  ALT  86[H] [0 - 41]  /  AlkPhos  195[H] [30 - 115]  04-10          RADIOLOGY & ADDITIONAL TESTS:  CXR:   < from: Xray Chest 1 View- PORTABLE-Routine (Xray Chest 1 View- PORTABLE-Routine in AM.) (04.10.25 @ 06:48) >    INTERPRETATION:  CLINICAL HISTORY: Follow-up.    COMPARISON: April 9, 2025.    TECHNIQUE: Portable frontal chest radiograph. Low lung volume.    FINDINGS:    Support devices: None.    Cardiac/mediastinum/hilum: Stable.    Lung parenchyma/Pleura: Bilateral opacities, unchanged. No pneumothorax   is seen.    Skeleton/soft tissues: Stable.      IMPRESSION: Low lung volume.    Bilateral opacities, unchanged.      EKG:  < from: 12 Lead ECG (04.09.25 @ 07:21) >  Ventricular Rate 77 BPM    Atrial Rate 77 BPM    P-R Interval 132 ms    QRS Duration 94 ms    Q-T Interval 386 ms    QTC Calculation(Bazett) 436 ms    P Axis 23 degrees    R Axis 10 degrees    T Axis 12 degrees    Diagnosis Line Normal sinus rhythm  Possible Left atrial enlargement  Borderline ECG      Allergies    No Known Allergies    Intolerances      MEDICATIONS  (STANDING):  albumin human  5% IVPB 3000 milliLiter(s) IV Intermittent once  albuterol/ipratropium for Nebulization 3 milliLiter(s) Nebulizer every 6 hours  ascorbic acid 500 milliGRAM(s) Oral daily  aspirin enteric coated 325 milliGRAM(s) Oral daily  atorvastatin 40 milliGRAM(s) Oral at bedtime  bisacodyl 5 milliGRAM(s) Oral every 12 hours  chlorhexidine 2% Cloths 1 Application(s) Topical daily  clopidogrel Tablet 75 milliGRAM(s) Oral daily  dextrose 5%. 1000 milliLiter(s) (50 mL/Hr) IV Continuous <Continuous>  dextrose 5%. 1000 milliLiter(s) (100 mL/Hr) IV Continuous <Continuous>  dextrose 50% Injectable 50 milliLiter(s) IV Push every 15 minutes  enoxaparin Injectable 40 milliGRAM(s) SubCutaneous every 24 hours  famotidine    Tablet 20 milliGRAM(s) Oral two times a day  ferrous    sulfate 325 milliGRAM(s) Oral daily  folic acid 1 milliGRAM(s) Oral daily  furosemide   Injectable 20 milliGRAM(s) IV Push every 12 hours  glucagon  Injectable 1 milliGRAM(s) IntraMuscular once  guaiFENesin  milliGRAM(s) Oral every 12 hours  insulin lispro (ADMELOG) corrective regimen sliding scale   SubCutaneous three times a day before meals  insulin lispro (ADMELOG) corrective regimen sliding scale   SubCutaneous at bedtime  lactulose Syrup 20 Gram(s) Oral every 6 hours  lidocaine   4% Patch 1 Patch Transdermal every 24 hours  metoprolol tartrate 12.5 milliGRAM(s) Oral every 12 hours  multivitamin/minerals 1 Tablet(s) Oral daily  piperacillin/tazobactam IVPB.. 3.375 Gram(s) IV Intermittent every 8 hours  polyethylene glycol 3350 17 Gram(s) Oral daily  senna 1 Tablet(s) Oral at bedtime  sodium chloride 3%  Inhalation 4 milliLiter(s) Inhalation every 12 hours    MEDICATIONS  (PRN):  dextrose Oral Gel 15 Gram(s) Oral once PRN Blood Glucose LESS THAN 70 milliGRAM(s)/deciliter  ibuprofen  Tablet. 400 milliGRAM(s) Oral every 8 hours PRN Temp greater or equal to 38C (100.4F)  melatonin 5 milliGRAM(s) Oral at bedtime PRN Insomnia  oxyCODONE    IR 5 milliGRAM(s) Oral every 4 hours PRN Moderate Pain (4 - 6)  oxyCODONE    IR 10 milliGRAM(s) Oral every 4 hours PRN Severe Pain (7 - 10)    Home Medications:  lisinopril 40 mg oral tablet: 1 tab(s) orally once a day PATIENT NON COMPLIANT WITH MEDICATION (02 Apr 2025 09:30)      Pharmacologic DVT Prophylaxis [X] YES, [] NO: Contraindication:  SCD's: YES b/l    GI Prophylaxis: pepcid    Post-Op Beta-Blockers: [X] Yes, [] No: contraindication:  Post-Op Aspirin:  [X] Yes, [] No: contraindication:  Post-Op Statin:  [X] Yes, [] No: contraindication:    Ambulation/Activity Status: ambulate as tolerated    Assessment/Plan:  55y Male status-post CABG x4    POD #6  - Case and plan discussed with CTU Intensivist and CT Surgeon - Dr. Mckinney  - Continue CTU supportive care and ongoing plan of care as per continuing CTU rounds.   - Continue DVT/GI prophylaxis  - Incentive Spirometry 10 times an hour  - Continue to advance physical activity as tolerated and continue PT/OT as directed  1. CAD: Continue ASA, statin, BB, plavix, pain control prn  2. HTN: cont to monitor, cont bb   3. A. Fib prophylaxis: cont to monitor electrolytes, cont bb   4. Post-op Hypoxia secondary to acute pulmonary insufficiency: on room air, IPV at bedside, continue IV lasix for noncardiac fluid overload. cont nebs, encourage cough and deep breathing.   5. DM/Glucose Control: cont insulin sliding scale   6. Fever: WBC normal, afebrile today, procalcitonin elevated, continue Zosyn    Social Service Disposition: home with home care today tomorrow

## 2025-04-11 VITALS
TEMPERATURE: 98 F | DIASTOLIC BLOOD PRESSURE: 65 MMHG | SYSTOLIC BLOOD PRESSURE: 111 MMHG | OXYGEN SATURATION: 96 % | RESPIRATION RATE: 18 BRPM | HEART RATE: 77 BPM

## 2025-04-11 LAB
ALBUMIN SERPL ELPH-MCNC: 3.3 G/DL — LOW (ref 3.5–5.2)
ALP SERPL-CCNC: 170 U/L — HIGH (ref 30–115)
ALT FLD-CCNC: 54 U/L — HIGH (ref 0–41)
ANION GAP SERPL CALC-SCNC: 14 MMOL/L — SIGNIFICANT CHANGE UP (ref 7–14)
AST SERPL-CCNC: 27 U/L — SIGNIFICANT CHANGE UP (ref 0–41)
BILIRUB SERPL-MCNC: 0.4 MG/DL — SIGNIFICANT CHANGE UP (ref 0.2–1.2)
BUN SERPL-MCNC: 25 MG/DL — HIGH (ref 10–20)
CALCIUM SERPL-MCNC: 8.9 MG/DL — SIGNIFICANT CHANGE UP (ref 8.4–10.5)
CHLORIDE SERPL-SCNC: 101 MMOL/L — SIGNIFICANT CHANGE UP (ref 98–110)
CO2 SERPL-SCNC: 23 MMOL/L — SIGNIFICANT CHANGE UP (ref 17–32)
CREAT SERPL-MCNC: 1.1 MG/DL — SIGNIFICANT CHANGE UP (ref 0.7–1.5)
EGFR: 78 ML/MIN/1.73M2 — SIGNIFICANT CHANGE UP
EGFR: 78 ML/MIN/1.73M2 — SIGNIFICANT CHANGE UP
GLUCOSE SERPL-MCNC: 129 MG/DL — HIGH (ref 70–99)
HCT VFR BLD CALC: 24.2 % — LOW (ref 42–52)
HGB BLD-MCNC: 8.1 G/DL — LOW (ref 14–18)
MAGNESIUM SERPL-MCNC: 2.2 MG/DL — SIGNIFICANT CHANGE UP (ref 1.8–2.4)
MCHC RBC-ENTMCNC: 30.3 PG — SIGNIFICANT CHANGE UP (ref 27–31)
MCHC RBC-ENTMCNC: 33.5 G/DL — SIGNIFICANT CHANGE UP (ref 32–37)
MCV RBC AUTO: 90.6 FL — SIGNIFICANT CHANGE UP (ref 80–94)
NRBC BLD AUTO-RTO: 0 /100 WBCS — SIGNIFICANT CHANGE UP (ref 0–0)
PLATELET # BLD AUTO: 409 K/UL — HIGH (ref 130–400)
PMV BLD: 9.9 FL — SIGNIFICANT CHANGE UP (ref 7.4–10.4)
POTASSIUM SERPL-MCNC: 4.4 MMOL/L — SIGNIFICANT CHANGE UP (ref 3.5–5)
POTASSIUM SERPL-SCNC: 4.4 MMOL/L — SIGNIFICANT CHANGE UP (ref 3.5–5)
PROT SERPL-MCNC: 5.7 G/DL — LOW (ref 6–8)
RBC # BLD: 2.67 M/UL — LOW (ref 4.7–6.1)
RBC # FLD: 12.9 % — SIGNIFICANT CHANGE UP (ref 11.5–14.5)
SODIUM SERPL-SCNC: 138 MMOL/L — SIGNIFICANT CHANGE UP (ref 135–146)
WBC # BLD: 10.4 K/UL — SIGNIFICANT CHANGE UP (ref 4.8–10.8)
WBC # FLD AUTO: 10.4 K/UL — SIGNIFICANT CHANGE UP (ref 4.8–10.8)

## 2025-04-11 PROCEDURE — 71045 X-RAY EXAM CHEST 1 VIEW: CPT | Mod: 26

## 2025-04-11 PROCEDURE — 99232 SBSQ HOSP IP/OBS MODERATE 35: CPT | Mod: 24

## 2025-04-11 PROCEDURE — 93010 ELECTROCARDIOGRAM REPORT: CPT

## 2025-04-11 RX ORDER — FUROSEMIDE 10 MG/ML
1 INJECTION INTRAMUSCULAR; INTRAVENOUS
Qty: 7 | Refills: 0
Start: 2025-04-11 | End: 2025-04-17

## 2025-04-11 RX ORDER — DEXTROMETHORPHAN HBR, GUAIFENESIN 200 MG/10ML
1 LIQUID ORAL
Qty: 10 | Refills: 0
Start: 2025-04-11 | End: 2025-04-15

## 2025-04-11 RX ORDER — ALBUTEROL SULFATE 2.5 MG/3ML
2 VIAL, NEBULIZER (ML) INHALATION
Qty: 1 | Refills: 0
Start: 2025-04-11 | End: 2025-04-24

## 2025-04-11 RX ORDER — SENNA 187 MG
1 TABLET ORAL
Qty: 0 | Refills: 0 | DISCHARGE
Start: 2025-04-11

## 2025-04-11 RX ORDER — METOPROLOL SUCCINATE 50 MG/1
0.5 TABLET, EXTENDED RELEASE ORAL
Qty: 30 | Refills: 0
Start: 2025-04-11 | End: 2025-05-10

## 2025-04-11 RX ORDER — OXYCODONE HYDROCHLORIDE 30 MG/1
1 TABLET ORAL
Qty: 30 | Refills: 0
Start: 2025-04-11 | End: 2025-04-15

## 2025-04-11 RX ORDER — CYST/ALA/Q10/PHOS.SER/DHA/BROC 100-20-50
1 POWDER (GRAM) ORAL
Qty: 0 | Refills: 0 | DISCHARGE
Start: 2025-04-11

## 2025-04-11 RX ORDER — FERROUS SULFATE 137(45) MG
1 TABLET, EXTENDED RELEASE ORAL
Qty: 30 | Refills: 0
Start: 2025-04-11 | End: 2025-05-10

## 2025-04-11 RX ORDER — POLYETHYLENE GLYCOL 3350 17 G/17G
17 POWDER, FOR SOLUTION ORAL
Qty: 0 | Refills: 0 | DISCHARGE
Start: 2025-04-11

## 2025-04-11 RX ORDER — ATORVASTATIN CALCIUM 80 MG/1
1 TABLET, FILM COATED ORAL
Qty: 30 | Refills: 0
Start: 2025-04-11 | End: 2025-05-10

## 2025-04-11 RX ORDER — ASPIRIN 325 MG
1 TABLET ORAL
Qty: 30 | Refills: 0
Start: 2025-04-11 | End: 2025-05-10

## 2025-04-11 RX ORDER — FOLIC ACID 1 MG/1
1 TABLET ORAL
Qty: 0 | Refills: 0 | DISCHARGE
Start: 2025-04-11

## 2025-04-11 RX ORDER — CLOPIDOGREL BISULFATE 75 MG/1
1 TABLET, FILM COATED ORAL
Qty: 30 | Refills: 0
Start: 2025-04-11 | End: 2025-05-10

## 2025-04-11 RX ADMIN — CLOPIDOGREL BISULFATE 75 MILLIGRAM(S): 75 TABLET, FILM COATED ORAL at 11:19

## 2025-04-11 RX ADMIN — IPRATROPIUM BROMIDE AND ALBUTEROL SULFATE 3 MILLILITER(S): .5; 2.5 SOLUTION RESPIRATORY (INHALATION) at 07:25

## 2025-04-11 RX ADMIN — Medication 20 MILLIGRAM(S): at 05:56

## 2025-04-11 RX ADMIN — Medication 4 MILLILITER(S): at 07:25

## 2025-04-11 RX ADMIN — Medication 500 MILLIGRAM(S): at 11:18

## 2025-04-11 RX ADMIN — OXYCODONE HYDROCHLORIDE 5 MILLIGRAM(S): 30 TABLET ORAL at 06:07

## 2025-04-11 RX ADMIN — Medication 1 APPLICATION(S): at 05:55

## 2025-04-11 RX ADMIN — FOLIC ACID 1 MILLIGRAM(S): 1 TABLET ORAL at 11:19

## 2025-04-11 RX ADMIN — Medication 25 GRAM(S): at 12:20

## 2025-04-11 RX ADMIN — Medication 25 GRAM(S): at 05:57

## 2025-04-11 RX ADMIN — ENOXAPARIN SODIUM 40 MILLIGRAM(S): 100 INJECTION SUBCUTANEOUS at 12:20

## 2025-04-11 RX ADMIN — Medication 325 MILLIGRAM(S): at 11:19

## 2025-04-11 RX ADMIN — METOPROLOL SUCCINATE 12.5 MILLIGRAM(S): 50 TABLET, EXTENDED RELEASE ORAL at 05:56

## 2025-04-11 RX ADMIN — DEXTROMETHORPHAN HBR, GUAIFENESIN 600 MILLIGRAM(S): 200 LIQUID ORAL at 05:56

## 2025-04-11 RX ADMIN — Medication 1 TABLET(S): at 11:21

## 2025-04-11 RX ADMIN — IPRATROPIUM BROMIDE AND ALBUTEROL SULFATE 3 MILLILITER(S): .5; 2.5 SOLUTION RESPIRATORY (INHALATION) at 02:41

## 2025-04-11 RX ADMIN — Medication 5 MILLIGRAM(S): at 05:55

## 2025-04-11 RX ADMIN — FUROSEMIDE 20 MILLIGRAM(S): 10 INJECTION INTRAMUSCULAR; INTRAVENOUS at 05:57

## 2025-04-11 NOTE — PROGRESS NOTE ADULT - SUBJECTIVE AND OBJECTIVE BOX
OPERATIVE PROCEDURE(s):                POD #  7 CABG                     55yMale  SURGEON(s): ANDREW Mckinney  SUBJECTIVE ASSESSMENT: anxious about going home    Vital Signs Last 24 Hrs  T(F): 98.2 (11 Apr 2025 08:24), Max: 98.5 (10 Apr 2025 15:56)  HR: 77 (11 Apr 2025 08:24) (77 - 90)  BP: 111/65 (11 Apr 2025 08:24) (111/65 - 135/74)  BP(mean): 80 (11 Apr 2025 08:24) (80 - 95)  RR: 18 (11 Apr 2025 08:24) (18 - 18)  SpO2: 96% (11 Apr 2025 08:24) (93% - 96%)      I&O's Detail    10 Apr 2025 07:01  -  11 Apr 2025 07:00  --------------------------------------------------------  IN:    IV PiggyBack: 100 mL    Oral Fluid: 240 mL  Total IN: 340 mL    OUT:    Voided (mL): 800 mL  Total OUT: 800 mL    Net:   I&O's Detail    09 Apr 2025 07:01  -  10 Apr 2025 07:00  --------------------------------------------------------  Total NET: -1045 mL      10 Apr 2025 07:01  -  11 Apr 2025 07:00  --------------------------------------------------------  Total NET: -460 mL    CAPILLARY BLOOD GLUCOSE    Physical Exam:  General: NAD; A&Ox3  Cardiac: S1/S2, RRR, no murmur, no rubs  Lungs: unlabored shallow respirations, bilateral bs decreased at bases, R>L  Abdomen: Soft/NT/protuberant  Sternum: Intact, no click, incision healing well with no drainage  Incisions: Incisions clean/dry/intact  Extremities: No edema b/l lower extremities       LABS:                        8.1[L]  10.40 )-----------( 409[H]    ( 11 Apr 2025 04:43 )             24.2[L]                        7.3[L]  9.16  )-----------( 300      ( 10 Apr 2025 02:25 )             22.0[L]    04-11    138  |  101  |  25[H]  ----------------------------<  129[H]  4.4   |  23  |  1.1  04-10    137  |  100  |  29[H]  ----------------------------<  100[H]  4.6   |  23  |  1.0    Ca    8.9      11 Apr 2025 04:43  Mg     2.2     04-11    TPro  5.7[L] [6.0 - 8.0]  /  Alb  3.3[L] [3.5 - 5.2]  /  TBili  0.4 [0.2 - 1.2]  /  DBili  x   /  AST  27 [0 - 41]  /  ALT  54[H] [0 - 41]  /  AlkPhos  170[H] [30 - 115]  04-11    RADIOLOGY & ADDITIONAL TESTS:  CXR: < from: Xray Chest 1 View- PORTABLE-Routine (Xray Chest 1 View- PORTABLE-Routine in AM.) (04.11.25 @ 05:09) >  IMPRESSION: Low lung volume.    Bibasilar opacities, unchanged.    < end of copied text >  < from: 12 Lead ECG (04.11.25 @ 07:18) >  Ventricular Rate 76 BPM    Atrial Rate 76 BPM    P-R Interval 136 ms    QRS Duration 78 ms    Q-T Interval 398 ms    QTC Calculation(Bazett) 447 ms    P Axis 49 degrees    R Axis 94 degrees    T Axis 51 degrees    Diagnosis Line Normal sinus rhythm  Rightward axis  PRWP  Abnormal ECG    MEDICATIONS  (STANDING):  aspirin enteric coated 325 milliGRAM(s) Oral daily  atorvastatin 40 milliGRAM(s) Oral at bedtime  clopidogrel Tablet 75 milliGRAM(s) Oral daily  famotidine    Tablet 20 milliGRAM(s) Oral two times a day  ferrous    sulfate 325 milliGRAM(s) Oral daily  folic acid 1 milliGRAM(s) Oral daily  guaiFENesin  milliGRAM(s) Oral every 12 hours  metoprolol tartrate 12.5 milliGRAM(s) Oral every 12 hours  multivitamin/minerals 1 Tablet(s) Oral daily  polyethylene glycol 3350 17 Gram(s) Oral daily  senna 1 Tablet(s) Oral at bedtime    MEDICATIONS  (PRN):  oxyCODONE    IR 5 milliGRAM(s) Oral every 4 hours PRN Moderate Pain (4 - 6)    Allergies    No Known Allergies    Intolerances      Ambulation/Activity Status: ambulate    Assessment/Plan:  55y Male status-post CABG x4    POD #7  - Case and plan discussed with CTU Intensivist and CT Surgeon - Dr. Mckinney  - Continue CTU supportive care and ongoing plan of care as per continuing CTU rounds.   - Continue DVT/GI prophylaxis  - Incentive Spirometry 10 times an hour  - Continue to advance physical activity as tolerated and continue PT/OT as directed  1. CAD: Continue ASA, statin, BB, plavix, pain control prn  2. HTN: cont to monitor, cont bb   3. A. Fib prophylaxis: cont to monitor electrolytes, cont bb   4. Post-op Hypoxia secondary to acute pulmonary insufficiency: on room air, will discharge on PO lasix for noncardiac fluid overload. , encourage cough and deep breathing, recurrent right pleural effusion on rm air , sat 98%, incentive 2 Liters. will give Rx for f/u CXR prior to office visit  5. DM/Glucose Control: discontinue sliding scale  6. PO Fever: WBC normal, afebrile today, discontinue Zosyn, discharge on no antibiotics    Social Service Disposition: home with home care today

## 2025-04-11 NOTE — PROGRESS NOTE ADULT - PROVIDER SPECIALTY LIST ADULT
CT Surgery
CT Surgery
Critical Care
CT Surgery
Critical Care

## 2025-04-12 ENCOUNTER — TRANSCRIPTION ENCOUNTER (OUTPATIENT)
Age: 55
End: 2025-04-12

## 2025-04-13 LAB
CULTURE RESULTS: SIGNIFICANT CHANGE UP
SPECIMEN SOURCE: SIGNIFICANT CHANGE UP

## 2025-04-14 ENCOUNTER — APPOINTMENT (OUTPATIENT)
Dept: CARE COORDINATION | Facility: HOME HEALTH | Age: 55
End: 2025-04-14
Payer: COMMERCIAL

## 2025-04-14 ENCOUNTER — NON-APPOINTMENT (OUTPATIENT)
Age: 55
End: 2025-04-14

## 2025-04-14 PROCEDURE — 99024 POSTOP FOLLOW-UP VISIT: CPT

## 2025-04-14 RX ORDER — ASPIRIN 325 MG/1
325 TABLET, FILM COATED ORAL
Refills: 0 | Status: ACTIVE | COMMUNITY
Start: 2025-04-14

## 2025-04-14 RX ORDER — CLOPIDOGREL BISULFATE 75 MG/1
75 TABLET, FILM COATED ORAL
Refills: 0 | Status: ACTIVE | COMMUNITY
Start: 2025-04-14

## 2025-04-14 RX ORDER — POTASSIUM CHLORIDE 750 MG/1
10 TABLET, EXTENDED RELEASE ORAL
Refills: 0 | Status: ACTIVE | COMMUNITY
Start: 2025-04-14

## 2025-04-14 RX ORDER — ATORVASTATIN CALCIUM 40 MG/1
40 TABLET, FILM COATED ORAL
Refills: 0 | Status: ACTIVE | COMMUNITY
Start: 2025-04-14

## 2025-04-14 RX ORDER — ALBUTEROL SULFATE 90 UG/1
108 (90 BASE) INHALANT RESPIRATORY (INHALATION)
Refills: 0 | Status: ACTIVE | COMMUNITY
Start: 2025-04-14

## 2025-04-14 RX ORDER — FOLIC ACID 1 MG/1
1 TABLET ORAL
Refills: 0 | Status: ACTIVE | COMMUNITY
Start: 2025-04-14

## 2025-04-14 RX ORDER — CHLORHEXIDINE GLUCONATE 4 %
325 (65 FE) LIQUID (ML) TOPICAL
Refills: 0 | Status: ACTIVE | COMMUNITY
Start: 2025-04-14

## 2025-04-14 RX ORDER — METOPROLOL TARTRATE 25 MG/1
25 TABLET ORAL
Qty: 30 | Refills: 0 | Status: ACTIVE | COMMUNITY
Start: 2025-04-14

## 2025-04-14 RX ORDER — FAMOTIDINE 20 MG/1
20 TABLET, FILM COATED ORAL
Refills: 0 | Status: ACTIVE | COMMUNITY
Start: 2025-04-14

## 2025-04-14 RX ORDER — GUAIFENESIN 600 MG/1
600 TABLET, EXTENDED RELEASE ORAL
Refills: 0 | Status: ACTIVE | COMMUNITY
Start: 2025-04-14

## 2025-04-15 DIAGNOSIS — E87.79 OTHER FLUID OVERLOAD: ICD-10-CM

## 2025-04-15 DIAGNOSIS — R74.01 ELEVATION OF LEVELS OF LIVER TRANSAMINASE LEVELS: ICD-10-CM

## 2025-04-15 DIAGNOSIS — J98.11 ATELECTASIS: ICD-10-CM

## 2025-04-15 DIAGNOSIS — J90 PLEURAL EFFUSION, NOT ELSEWHERE CLASSIFIED: ICD-10-CM

## 2025-04-15 DIAGNOSIS — J95.1 ACUTE PULMONARY INSUFFICIENCY FOLLOWING THORACIC SURGERY: ICD-10-CM

## 2025-04-15 DIAGNOSIS — Z87.891 PERSONAL HISTORY OF NICOTINE DEPENDENCE: ICD-10-CM

## 2025-04-15 DIAGNOSIS — J18.9 PNEUMONIA, UNSPECIFIED ORGANISM: ICD-10-CM

## 2025-04-15 DIAGNOSIS — I25.118 ATHEROSCLEROTIC HEART DISEASE OF NATIVE CORONARY ARTERY WITH OTHER FORMS OF ANGINA PECTORIS: ICD-10-CM

## 2025-04-15 DIAGNOSIS — I95.9 HYPOTENSION, UNSPECIFIED: ICD-10-CM

## 2025-04-15 DIAGNOSIS — Z82.49 FAMILY HISTORY OF ISCHEMIC HEART DISEASE AND OTHER DISEASES OF THE CIRCULATORY SYSTEM: ICD-10-CM

## 2025-04-15 DIAGNOSIS — I10 ESSENTIAL (PRIMARY) HYPERTENSION: ICD-10-CM

## 2025-04-15 DIAGNOSIS — R09.02 HYPOXEMIA: ICD-10-CM

## 2025-04-15 DIAGNOSIS — E78.5 HYPERLIPIDEMIA, UNSPECIFIED: ICD-10-CM

## 2025-04-15 DIAGNOSIS — E83.42 HYPOMAGNESEMIA: ICD-10-CM

## 2025-04-15 DIAGNOSIS — D62 ACUTE POSTHEMORRHAGIC ANEMIA: ICD-10-CM

## 2025-04-15 DIAGNOSIS — K21.9 GASTRO-ESOPHAGEAL REFLUX DISEASE WITHOUT ESOPHAGITIS: ICD-10-CM

## 2025-04-15 PROBLEM — Z95.1 S/P CABG (CORONARY ARTERY BYPASS GRAFT): Status: ACTIVE | Noted: 2025-04-14

## 2025-04-15 RX ORDER — FUROSEMIDE 20 MG/1
20 TABLET ORAL
Refills: 0 | Status: ACTIVE | COMMUNITY

## 2025-04-15 RX ORDER — MULTIVIT-MIN/FOLIC/VIT K/LYCOP 400-300MCG
TABLET ORAL
Refills: 0 | Status: ACTIVE | COMMUNITY

## 2025-04-15 RX ORDER — SENNOSIDES 8.6 MG/1
CAPSULE, GELATIN COATED ORAL
Refills: 0 | Status: ACTIVE | COMMUNITY

## 2025-04-15 RX ORDER — LORATADINE 10 MG
17 TABLET,DISINTEGRATING ORAL
Refills: 0 | Status: ACTIVE | COMMUNITY

## 2025-04-16 ENCOUNTER — APPOINTMENT (OUTPATIENT)
Dept: CARDIOTHORACIC SURGERY | Facility: CLINIC | Age: 55
End: 2025-04-16

## 2025-04-16 ENCOUNTER — RESULT REVIEW (OUTPATIENT)
Age: 55
End: 2025-04-16

## 2025-04-16 ENCOUNTER — OUTPATIENT (OUTPATIENT)
Dept: OUTPATIENT SERVICES | Facility: HOSPITAL | Age: 55
LOS: 1 days | End: 2025-04-16
Payer: COMMERCIAL

## 2025-04-16 VITALS
DIASTOLIC BLOOD PRESSURE: 84 MMHG | OXYGEN SATURATION: 95 % | HEIGHT: 69 IN | WEIGHT: 172 LBS | RESPIRATION RATE: 16 BRPM | TEMPERATURE: 97.5 F | BODY MASS INDEX: 25.48 KG/M2 | SYSTOLIC BLOOD PRESSURE: 136 MMHG | HEART RATE: 57 BPM

## 2025-04-16 DIAGNOSIS — Z98.890 OTHER SPECIFIED POSTPROCEDURAL STATES: Chronic | ICD-10-CM

## 2025-04-16 DIAGNOSIS — I86.1 SCROTAL VARICES: Chronic | ICD-10-CM

## 2025-04-16 DIAGNOSIS — J90 PLEURAL EFFUSION, NOT ELSEWHERE CLASSIFIED: ICD-10-CM

## 2025-04-16 LAB
HCT VFR BLD CALC: 29.1 %
HGB BLD-MCNC: 9 G/DL
MCHC RBC-ENTMCNC: 29.1 PG
MCHC RBC-ENTMCNC: 30.9 G/DL
MCV RBC AUTO: 94.2 FL
PLATELET # BLD AUTO: 807 K/UL
PMV BLD AUTO: 0 /100 WBCS
PMV BLD: 10.1 FL
RBC # BLD: 3.09 M/UL
RBC # FLD: 13.5 %
WBC # FLD AUTO: 14.7 K/UL

## 2025-04-16 PROCEDURE — 71046 X-RAY EXAM CHEST 2 VIEWS: CPT | Mod: 26

## 2025-04-16 PROCEDURE — 99024 POSTOP FOLLOW-UP VISIT: CPT

## 2025-04-16 PROCEDURE — 71046 X-RAY EXAM CHEST 2 VIEWS: CPT

## 2025-04-16 RX ORDER — LIDOCAINE 5% 700 MG/1
5 PATCH TOPICAL
Qty: 10 | Refills: 0 | Status: ACTIVE | COMMUNITY
Start: 2025-04-16 | End: 1900-01-01

## 2025-04-16 RX ORDER — ALPRAZOLAM 0.25 MG/1
0.25 TABLET ORAL
Qty: 5 | Refills: 0 | Status: ACTIVE | COMMUNITY
Start: 2025-04-16 | End: 1900-01-01

## 2025-04-17 DIAGNOSIS — J90 PLEURAL EFFUSION, NOT ELSEWHERE CLASSIFIED: ICD-10-CM

## 2025-04-17 LAB
ANION GAP SERPL CALC-SCNC: 14 MMOL/L
BUN SERPL-MCNC: 19 MG/DL
CALCIUM SERPL-MCNC: 9.5 MG/DL
CHLORIDE SERPL-SCNC: 100 MMOL/L
CO2 SERPL-SCNC: 24 MMOL/L
CREAT SERPL-MCNC: 1.1 MG/DL
EGFRCR SERPLBLD CKD-EPI 2021: 78 ML/MIN/1.73M2
GLUCOSE SERPL-MCNC: 141 MG/DL
MAGNESIUM SERPL-MCNC: 2.2 MG/DL
POTASSIUM SERPL-SCNC: 4.9 MMOL/L
SODIUM SERPL-SCNC: 138 MMOL/L

## 2025-04-23 ENCOUNTER — APPOINTMENT (OUTPATIENT)
Dept: CARDIOTHORACIC SURGERY | Facility: CLINIC | Age: 55
End: 2025-04-23
Payer: COMMERCIAL

## 2025-04-23 ENCOUNTER — OUTPATIENT (OUTPATIENT)
Dept: OUTPATIENT SERVICES | Facility: HOSPITAL | Age: 55
LOS: 1 days | End: 2025-04-23
Payer: COMMERCIAL

## 2025-04-23 VITALS
HEIGHT: 69 IN | DIASTOLIC BLOOD PRESSURE: 74 MMHG | SYSTOLIC BLOOD PRESSURE: 124 MMHG | BODY MASS INDEX: 24.44 KG/M2 | OXYGEN SATURATION: 96 % | WEIGHT: 165 LBS | HEART RATE: 74 BPM | TEMPERATURE: 97.9 F | RESPIRATION RATE: 16 BRPM

## 2025-04-23 DIAGNOSIS — S69.91XA UNSPECIFIED INJURY OF RIGHT WRIST, HAND AND FINGER(S), INITIAL ENCOUNTER: Chronic | ICD-10-CM

## 2025-04-23 DIAGNOSIS — Z00.00 ENCOUNTER FOR GENERAL ADULT MEDICAL EXAMINATION W/OUT ABNORMAL FINDINGS: ICD-10-CM

## 2025-04-23 DIAGNOSIS — I86.1 SCROTAL VARICES: Chronic | ICD-10-CM

## 2025-04-23 DIAGNOSIS — Z98.890 OTHER SPECIFIED POSTPROCEDURAL STATES: Chronic | ICD-10-CM

## 2025-04-23 DIAGNOSIS — Z00.00 ENCOUNTER FOR GENERAL ADULT MEDICAL EXAMINATION WITHOUT ABNORMAL FINDINGS: ICD-10-CM

## 2025-04-23 DIAGNOSIS — Z95.1 PRESENCE OF AORTOCORONARY BYPASS GRAFT: ICD-10-CM

## 2025-04-23 LAB
ALBUMIN SERPL ELPH-MCNC: 4.1 G/DL
ALP BLD-CCNC: 103 U/L
ALT SERPL-CCNC: 16 U/L
ANION GAP SERPL CALC-SCNC: 15 MMOL/L
AST SERPL-CCNC: 12 U/L
BILIRUB SERPL-MCNC: 0.2 MG/DL
BUN SERPL-MCNC: 19 MG/DL
CALCIUM SERPL-MCNC: 9.7 MG/DL
CHLORIDE SERPL-SCNC: 101 MMOL/L
CO2 SERPL-SCNC: 23 MMOL/L
CREAT SERPL-MCNC: 0.9 MG/DL
EGFRCR SERPLBLD CKD-EPI 2021: 100 ML/MIN/1.73M2
GLUCOSE SERPL-MCNC: 85 MG/DL
HCT VFR BLD CALC: 34.3 %
HGB BLD-MCNC: 10.6 G/DL
MCHC RBC-ENTMCNC: 29.1 PG
MCHC RBC-ENTMCNC: 30.9 G/DL
MCV RBC AUTO: 94.2 FL
PLATELET # BLD AUTO: 793 K/UL
PMV BLD AUTO: 0 /100 WBCS
PMV BLD: 9.4 FL
POTASSIUM SERPL-SCNC: 4.9 MMOL/L
PROT SERPL-MCNC: 6.7 G/DL
RBC # BLD: 3.64 M/UL
RBC # FLD: 13.4 %
SODIUM SERPL-SCNC: 139 MMOL/L
WBC # FLD AUTO: 7.46 K/UL

## 2025-04-23 PROCEDURE — 71046 X-RAY EXAM CHEST 2 VIEWS: CPT | Mod: 26

## 2025-04-23 PROCEDURE — 99024 POSTOP FOLLOW-UP VISIT: CPT

## 2025-04-23 PROCEDURE — 71046 X-RAY EXAM CHEST 2 VIEWS: CPT

## 2025-04-24 DIAGNOSIS — Z00.00 ENCOUNTER FOR GENERAL ADULT MEDICAL EXAMINATION WITHOUT ABNORMAL FINDINGS: ICD-10-CM

## 2025-05-07 ENCOUNTER — APPOINTMENT (OUTPATIENT)
Dept: CARDIOTHORACIC SURGERY | Facility: CLINIC | Age: 55
End: 2025-05-07
Payer: COMMERCIAL

## 2025-05-07 VITALS
TEMPERATURE: 98.2 F | RESPIRATION RATE: 16 BRPM | DIASTOLIC BLOOD PRESSURE: 80 MMHG | SYSTOLIC BLOOD PRESSURE: 116 MMHG | HEIGHT: 69 IN | OXYGEN SATURATION: 97 % | HEART RATE: 62 BPM | WEIGHT: 166 LBS | BODY MASS INDEX: 24.59 KG/M2

## 2025-05-07 DIAGNOSIS — Z95.1 PRESENCE OF AORTOCORONARY BYPASS GRAFT: ICD-10-CM

## 2025-05-07 PROCEDURE — 99024 POSTOP FOLLOW-UP VISIT: CPT

## 2025-05-21 ENCOUNTER — APPOINTMENT (OUTPATIENT)
Dept: CARDIOTHORACIC SURGERY | Facility: CLINIC | Age: 55
End: 2025-05-21
Payer: COMMERCIAL

## 2025-05-21 VITALS
TEMPERATURE: 98.4 F | WEIGHT: 165 LBS | OXYGEN SATURATION: 97 % | HEIGHT: 69 IN | BODY MASS INDEX: 24.44 KG/M2 | RESPIRATION RATE: 16 BRPM | HEART RATE: 59 BPM | SYSTOLIC BLOOD PRESSURE: 128 MMHG | DIASTOLIC BLOOD PRESSURE: 89 MMHG

## 2025-05-21 DIAGNOSIS — Z95.1 PRESENCE OF AORTOCORONARY BYPASS GRAFT: ICD-10-CM

## 2025-05-21 PROCEDURE — 99024 POSTOP FOLLOW-UP VISIT: CPT

## 2025-06-04 ENCOUNTER — APPOINTMENT (OUTPATIENT)
Dept: CARDIOTHORACIC SURGERY | Facility: CLINIC | Age: 55
End: 2025-06-04
Payer: COMMERCIAL

## 2025-06-04 VITALS
RESPIRATION RATE: 16 BRPM | BODY MASS INDEX: 24.29 KG/M2 | OXYGEN SATURATION: 97 % | HEART RATE: 51 BPM | HEIGHT: 69 IN | TEMPERATURE: 97.9 F | WEIGHT: 164 LBS | SYSTOLIC BLOOD PRESSURE: 115 MMHG | DIASTOLIC BLOOD PRESSURE: 79 MMHG

## 2025-06-04 DIAGNOSIS — Z95.1 PRESENCE OF AORTOCORONARY BYPASS GRAFT: ICD-10-CM

## 2025-06-04 DIAGNOSIS — Z00.00 ENCOUNTER FOR GENERAL ADULT MEDICAL EXAMINATION W/OUT ABNORMAL FINDINGS: ICD-10-CM

## 2025-06-04 PROCEDURE — 99024 POSTOP FOLLOW-UP VISIT: CPT

## 2025-06-04 RX ORDER — IBUPROFEN 400 MG/1
400 TABLET, FILM COATED ORAL 3 TIMES DAILY
Qty: 21 | Refills: 0 | Status: ACTIVE | COMMUNITY
Start: 2025-06-04 | End: 1900-01-01

## 2025-06-05 ENCOUNTER — NON-APPOINTMENT (OUTPATIENT)
Age: 55
End: 2025-06-05

## 2025-06-05 ENCOUNTER — OUTPATIENT (OUTPATIENT)
Dept: OUTPATIENT SERVICES | Facility: HOSPITAL | Age: 55
LOS: 1 days | End: 2025-06-05
Payer: COMMERCIAL

## 2025-06-05 DIAGNOSIS — S69.91XA UNSPECIFIED INJURY OF RIGHT WRIST, HAND AND FINGER(S), INITIAL ENCOUNTER: Chronic | ICD-10-CM

## 2025-06-05 DIAGNOSIS — Z98.890 OTHER SPECIFIED POSTPROCEDURAL STATES: Chronic | ICD-10-CM

## 2025-06-05 DIAGNOSIS — I86.1 SCROTAL VARICES: Chronic | ICD-10-CM

## 2025-06-05 DIAGNOSIS — R07.89 OTHER CHEST PAIN: ICD-10-CM

## 2025-06-05 PROCEDURE — 71046 X-RAY EXAM CHEST 2 VIEWS: CPT | Mod: 26

## 2025-06-05 PROCEDURE — 71046 X-RAY EXAM CHEST 2 VIEWS: CPT

## 2025-06-06 DIAGNOSIS — R07.89 OTHER CHEST PAIN: ICD-10-CM

## 2025-06-25 ENCOUNTER — APPOINTMENT (OUTPATIENT)
Age: 55
End: 2025-06-25

## 2025-06-30 ENCOUNTER — APPOINTMENT (OUTPATIENT)
Age: 55
End: 2025-06-30

## 2025-07-02 ENCOUNTER — APPOINTMENT (OUTPATIENT)
Age: 55
End: 2025-07-02

## 2025-07-07 ENCOUNTER — APPOINTMENT (OUTPATIENT)
Age: 55
End: 2025-07-07

## 2025-07-09 ENCOUNTER — APPOINTMENT (OUTPATIENT)
Age: 55
End: 2025-07-09

## 2025-07-11 ENCOUNTER — APPOINTMENT (OUTPATIENT)
Age: 55
End: 2025-07-11

## 2025-07-14 ENCOUNTER — APPOINTMENT (OUTPATIENT)
Age: 55
End: 2025-07-14

## 2025-07-16 ENCOUNTER — APPOINTMENT (OUTPATIENT)
Age: 55
End: 2025-07-16

## 2025-07-18 ENCOUNTER — APPOINTMENT (OUTPATIENT)
Age: 55
End: 2025-07-18

## 2025-07-21 ENCOUNTER — APPOINTMENT (OUTPATIENT)
Age: 55
End: 2025-07-21

## 2025-07-23 ENCOUNTER — APPOINTMENT (OUTPATIENT)
Age: 55
End: 2025-07-23

## 2025-07-25 ENCOUNTER — APPOINTMENT (OUTPATIENT)
Age: 55
End: 2025-07-25

## 2025-07-28 ENCOUNTER — APPOINTMENT (OUTPATIENT)
Age: 55
End: 2025-07-28

## 2025-07-30 ENCOUNTER — APPOINTMENT (OUTPATIENT)
Age: 55
End: 2025-07-30

## 2025-08-01 ENCOUNTER — APPOINTMENT (OUTPATIENT)
Age: 55
End: 2025-08-01

## 2025-08-04 ENCOUNTER — APPOINTMENT (OUTPATIENT)
Age: 55
End: 2025-08-04

## 2025-08-06 ENCOUNTER — APPOINTMENT (OUTPATIENT)
Age: 55
End: 2025-08-06

## 2025-08-08 ENCOUNTER — APPOINTMENT (OUTPATIENT)
Age: 55
End: 2025-08-08

## 2025-08-11 ENCOUNTER — APPOINTMENT (OUTPATIENT)
Age: 55
End: 2025-08-11

## 2025-08-13 ENCOUNTER — APPOINTMENT (OUTPATIENT)
Age: 55
End: 2025-08-13

## 2025-08-15 ENCOUNTER — APPOINTMENT (OUTPATIENT)
Age: 55
End: 2025-08-15

## 2025-08-18 ENCOUNTER — APPOINTMENT (OUTPATIENT)
Age: 55
End: 2025-08-18

## 2025-08-20 ENCOUNTER — APPOINTMENT (OUTPATIENT)
Age: 55
End: 2025-08-20

## 2025-08-22 ENCOUNTER — APPOINTMENT (OUTPATIENT)
Age: 55
End: 2025-08-22

## 2025-08-25 ENCOUNTER — APPOINTMENT (OUTPATIENT)
Age: 55
End: 2025-08-25

## 2025-08-27 ENCOUNTER — APPOINTMENT (OUTPATIENT)
Age: 55
End: 2025-08-27

## 2025-08-29 ENCOUNTER — APPOINTMENT (OUTPATIENT)
Age: 55
End: 2025-08-29

## 2025-09-03 ENCOUNTER — APPOINTMENT (OUTPATIENT)
Age: 55
End: 2025-09-03

## 2025-09-05 ENCOUNTER — APPOINTMENT (OUTPATIENT)
Age: 55
End: 2025-09-05

## 2025-09-08 ENCOUNTER — APPOINTMENT (OUTPATIENT)
Age: 55
End: 2025-09-08

## 2025-09-10 ENCOUNTER — APPOINTMENT (OUTPATIENT)
Age: 55
End: 2025-09-10

## 2025-09-12 ENCOUNTER — APPOINTMENT (OUTPATIENT)
Age: 55
End: 2025-09-12

## 2025-09-15 ENCOUNTER — APPOINTMENT (OUTPATIENT)
Age: 55
End: 2025-09-15

## 2025-09-17 ENCOUNTER — APPOINTMENT (OUTPATIENT)
Age: 55
End: 2025-09-17

## 2025-09-19 ENCOUNTER — APPOINTMENT (OUTPATIENT)
Age: 55
End: 2025-09-19